# Patient Record
Sex: MALE | Race: BLACK OR AFRICAN AMERICAN | Employment: UNEMPLOYED | URBAN - NONMETROPOLITAN AREA
[De-identification: names, ages, dates, MRNs, and addresses within clinical notes are randomized per-mention and may not be internally consistent; named-entity substitution may affect disease eponyms.]

---

## 2017-06-19 RX ORDER — GABAPENTIN 300 MG/1
CAPSULE ORAL
Qty: 90 CAPSULE | Refills: 6 | Status: ON HOLD | OUTPATIENT
Start: 2017-06-19 | End: 2017-09-14 | Stop reason: HOSPADM

## 2017-07-29 ENCOUNTER — HOSPITAL ENCOUNTER (EMERGENCY)
Age: 52
Discharge: HOME | End: 2017-07-29
Payer: MEDICAID

## 2017-07-29 VITALS
TEMPERATURE: 98.5 F | HEART RATE: 88 BPM | SYSTOLIC BLOOD PRESSURE: 126 MMHG | DIASTOLIC BLOOD PRESSURE: 72 MMHG | OXYGEN SATURATION: 100 % | RESPIRATION RATE: 20 BRPM

## 2017-07-29 DIAGNOSIS — K02.9 PAIN DUE TO DENTAL CARIES: Primary | ICD-10-CM

## 2017-07-29 PROCEDURE — 99282 EMERGENCY DEPT VISIT SF MDM: CPT

## 2017-07-29 PROCEDURE — 6370000000 HC RX 637 (ALT 250 FOR IP): Performed by: PHYSICIAN ASSISTANT

## 2017-07-29 RX ORDER — HYDROCODONE BITARTRATE AND ACETAMINOPHEN 5; 325 MG/1; MG/1
1 TABLET ORAL ONCE
Status: COMPLETED | OUTPATIENT
Start: 2017-07-29 | End: 2017-07-29

## 2017-07-29 RX ORDER — ACETAMINOPHEN AND CODEINE PHOSPHATE 300; 30 MG/1; MG/1
1 TABLET ORAL EVERY 4 HOURS PRN
Qty: 12 TABLET | Refills: 0 | Status: ON HOLD | OUTPATIENT
Start: 2017-07-29 | End: 2017-09-14 | Stop reason: HOSPADM

## 2017-07-29 RX ORDER — PENICILLIN V POTASSIUM 500 MG/1
500 TABLET ORAL 4 TIMES DAILY
Qty: 40 TABLET | Refills: 0 | Status: SHIPPED | OUTPATIENT
Start: 2017-07-29 | End: 2017-08-08

## 2017-07-29 RX ADMIN — HYDROCODONE BITARTRATE AND ACETAMINOPHEN 1 TABLET: 5; 325 TABLET ORAL at 12:47

## 2017-07-29 ASSESSMENT — ENCOUNTER SYMPTOMS
SORE THROAT: 0
WHEEZING: 0
BACK PAIN: 0
NAUSEA: 0
SHORTNESS OF BREATH: 0
BLOOD IN STOOL: 0
ABDOMINAL PAIN: 0
COUGH: 0
VOMITING: 0
DIARRHEA: 0

## 2017-07-29 ASSESSMENT — PAIN SCALES - GENERAL
PAINLEVEL_OUTOF10: 10
PAINLEVEL_OUTOF10: 10

## 2017-07-29 ASSESSMENT — PAIN DESCRIPTION - LOCATION: LOCATION: TEETH

## 2017-07-29 ASSESSMENT — PAIN DESCRIPTION - PAIN TYPE: TYPE: ACUTE PAIN

## 2017-07-29 ASSESSMENT — PAIN DESCRIPTION - FREQUENCY: FREQUENCY: CONTINUOUS

## 2017-07-29 ASSESSMENT — PAIN DESCRIPTION - DESCRIPTORS: DESCRIPTORS: STABBING;SHOOTING;SHARP

## 2017-09-08 ENCOUNTER — HOSPITAL ENCOUNTER (EMERGENCY)
Age: 52
Discharge: HOME | DRG: 753 | End: 2017-09-08
Attending: EMERGENCY MEDICINE
Payer: MEDICAID

## 2017-09-08 ENCOUNTER — HOSPITAL ENCOUNTER (INPATIENT)
Age: 52
LOS: 6 days | Discharge: HOME | DRG: 753 | End: 2017-09-14
Attending: PSYCHIATRY & NEUROLOGY | Admitting: PSYCHIATRY & NEUROLOGY
Payer: MEDICAID

## 2017-09-08 VITALS
BODY MASS INDEX: 28.63 KG/M2 | DIASTOLIC BLOOD PRESSURE: 86 MMHG | RESPIRATION RATE: 18 BRPM | TEMPERATURE: 97.5 F | WEIGHT: 200 LBS | SYSTOLIC BLOOD PRESSURE: 153 MMHG | HEIGHT: 70 IN | HEART RATE: 90 BPM | OXYGEN SATURATION: 98 %

## 2017-09-08 DIAGNOSIS — F19.94 SUBSTANCE INDUCED MOOD DISORDER (HCC): Primary | ICD-10-CM

## 2017-09-08 LAB
ACETAMINOPHEN LEVEL: < 5 UG/ML (ref 0–20)
ALBUMIN SERPL-MCNC: 4.5 G/DL (ref 3.5–5.1)
ALP BLD-CCNC: 47 U/L (ref 38–126)
ALT SERPL-CCNC: 30 U/L (ref 11–66)
AMPHETAMINE+METHAMPHETAMINE URINE SCREEN: NEGATIVE
ANION GAP SERPL CALCULATED.3IONS-SCNC: 11 MEQ/L (ref 8–16)
AST SERPL-CCNC: 40 U/L (ref 5–40)
BARBITURATE QUANTITATIVE URINE: NEGATIVE
BASOPHILS # BLD: 1.3 %
BASOPHILS ABSOLUTE: 0.1 THOU/MM3 (ref 0–0.1)
BENZODIAZEPINE QUANTITATIVE URINE: NEGATIVE
BILIRUB SERPL-MCNC: 0.7 MG/DL (ref 0.3–1.2)
BILIRUBIN DIRECT: < 0.2 MG/DL (ref 0–0.3)
BUN BLDV-MCNC: 15 MG/DL (ref 7–22)
CALCIUM SERPL-MCNC: 10 MG/DL (ref 8.5–10.5)
CANNABINOID QUANTITATIVE URINE: POSITIVE
CHLORIDE BLD-SCNC: 97 MEQ/L (ref 98–111)
CO2: 26 MEQ/L (ref 23–33)
COCAINE METABOLITE QUANTITATIVE URINE: POSITIVE
CREAT SERPL-MCNC: 1 MG/DL (ref 0.4–1.2)
EOSINOPHIL # BLD: 1.2 %
EOSINOPHILS ABSOLUTE: 0.1 THOU/MM3 (ref 0–0.4)
ETHYL ALCOHOL, SERUM: < 0.01 %
GFR SERPL CREATININE-BSD FRML MDRD: > 90 ML/MIN/1.73M2
GLUCOSE BLD-MCNC: 121 MG/DL (ref 70–108)
HCT VFR BLD CALC: 42.9 % (ref 42–52)
HEMOGLOBIN: 14.6 GM/DL (ref 14–18)
LYMPHOCYTES # BLD: 39.7 %
LYMPHOCYTES ABSOLUTE: 2.7 THOU/MM3 (ref 1–4.8)
MCH RBC QN AUTO: 33.6 PG (ref 27–31)
MCHC RBC AUTO-ENTMCNC: 34.1 GM/DL (ref 33–37)
MCV RBC AUTO: 98.5 FL (ref 80–94)
MONOCYTES # BLD: 6 %
MONOCYTES ABSOLUTE: 0.4 THOU/MM3 (ref 0.4–1.3)
NUCLEATED RED BLOOD CELLS: 0 /100 WBC
OPIATES, URINE: NEGATIVE
OSMOLALITY CALCULATION: 270.3 MOSMOL/KG (ref 275–300)
OXYCODONE: NEGATIVE
PDW BLD-RTO: 13.3 % (ref 11.5–14.5)
PHENCYCLIDINE QUANTITATIVE URINE: NEGATIVE
PLATELET # BLD: 271 THOU/MM3 (ref 130–400)
PMV BLD AUTO: 7.3 MCM (ref 7.4–10.4)
POTASSIUM SERPL-SCNC: 3.7 MEQ/L (ref 3.5–5.2)
RBC # BLD: 4.36 MILL/MM3 (ref 4.7–6.1)
RBC # BLD: NORMAL 10*6/UL
SALICYLATE, SERUM: < 0.3 MG/DL (ref 2–10)
SEG NEUTROPHILS: 51.8 %
SEGMENTED NEUTROPHILS ABSOLUTE COUNT: 3.5 THOU/MM3 (ref 1.8–7.7)
SODIUM BLD-SCNC: 134 MEQ/L (ref 135–145)
TOTAL PROTEIN: 8 G/DL (ref 6.1–8)
TSH SERPL DL<=0.05 MIU/L-ACNC: 0.7 UIU/ML (ref 0.4–4.2)
WBC # BLD: 6.7 THOU/MM3 (ref 4.8–10.8)

## 2017-09-08 PROCEDURE — 6370000000 HC RX 637 (ALT 250 FOR IP): Performed by: PSYCHIATRY & NEUROLOGY

## 2017-09-08 PROCEDURE — 84443 ASSAY THYROID STIM HORMONE: CPT

## 2017-09-08 PROCEDURE — G0480 DRUG TEST DEF 1-7 CLASSES: HCPCS

## 2017-09-08 PROCEDURE — 80053 COMPREHEN METABOLIC PANEL: CPT

## 2017-09-08 PROCEDURE — 1240000000 HC EMOTIONAL WELLNESS R&B

## 2017-09-08 PROCEDURE — 80307 DRUG TEST PRSMV CHEM ANLYZR: CPT

## 2017-09-08 PROCEDURE — 36415 COLL VENOUS BLD VENIPUNCTURE: CPT

## 2017-09-08 PROCEDURE — 82248 BILIRUBIN DIRECT: CPT

## 2017-09-08 PROCEDURE — 85025 COMPLETE CBC W/AUTO DIFF WBC: CPT

## 2017-09-08 PROCEDURE — 99284 EMERGENCY DEPT VISIT MOD MDM: CPT

## 2017-09-08 RX ORDER — LORAZEPAM 2 MG/1
2 TABLET ORAL EVERY 6 HOURS PRN
Status: DISCONTINUED | OUTPATIENT
Start: 2017-09-08 | End: 2017-09-14 | Stop reason: HOSPADM

## 2017-09-08 RX ORDER — CITALOPRAM 40 MG/1
40 TABLET ORAL DAILY
Status: DISCONTINUED | OUTPATIENT
Start: 2017-09-08 | End: 2017-09-14 | Stop reason: HOSPADM

## 2017-09-08 RX ORDER — ONDANSETRON 4 MG/1
4 TABLET, ORALLY DISINTEGRATING ORAL 4 TIMES DAILY PRN
Status: DISCONTINUED | OUTPATIENT
Start: 2017-09-08 | End: 2017-09-14 | Stop reason: HOSPADM

## 2017-09-08 RX ORDER — GABAPENTIN 300 MG/1
300 CAPSULE ORAL 3 TIMES DAILY
Status: DISCONTINUED | OUTPATIENT
Start: 2017-09-08 | End: 2017-09-14 | Stop reason: HOSPADM

## 2017-09-08 RX ORDER — NICOTINE 21 MG/24HR
1 PATCH, TRANSDERMAL 24 HOURS TRANSDERMAL DAILY
Status: DISCONTINUED | OUTPATIENT
Start: 2017-09-08 | End: 2017-09-14 | Stop reason: HOSPADM

## 2017-09-08 RX ORDER — HYDROCODONE BITARTRATE AND ACETAMINOPHEN 5; 325 MG/1; MG/1
1 TABLET ORAL EVERY 6 HOURS PRN
Status: DISCONTINUED | OUTPATIENT
Start: 2017-09-08 | End: 2017-09-14 | Stop reason: HOSPADM

## 2017-09-08 RX ORDER — ALBUTEROL SULFATE 90 UG/1
2 AEROSOL, METERED RESPIRATORY (INHALATION) EVERY 6 HOURS PRN
Status: DISCONTINUED | OUTPATIENT
Start: 2017-09-08 | End: 2017-09-14 | Stop reason: HOSPADM

## 2017-09-08 RX ORDER — THIAMINE MONONITRATE (VIT B1) 100 MG
100 TABLET ORAL DAILY
Status: DISCONTINUED | OUTPATIENT
Start: 2017-09-08 | End: 2017-09-14 | Stop reason: HOSPADM

## 2017-09-08 RX ORDER — HYDROXYZINE PAMOATE 25 MG/1
25 CAPSULE ORAL 3 TIMES DAILY PRN
Status: DISCONTINUED | OUTPATIENT
Start: 2017-09-08 | End: 2017-09-14 | Stop reason: HOSPADM

## 2017-09-08 RX ORDER — TRAZODONE HYDROCHLORIDE 50 MG/1
50 TABLET ORAL NIGHTLY PRN
Status: DISCONTINUED | OUTPATIENT
Start: 2017-09-08 | End: 2017-09-08

## 2017-09-08 RX ORDER — FOLIC ACID 1 MG/1
1 TABLET ORAL DAILY
Status: DISCONTINUED | OUTPATIENT
Start: 2017-09-08 | End: 2017-09-14 | Stop reason: HOSPADM

## 2017-09-08 RX ORDER — M-VIT,TX,IRON,MINS/CALC/FOLIC 27MG-0.4MG
1 TABLET ORAL DAILY
Status: DISCONTINUED | OUTPATIENT
Start: 2017-09-08 | End: 2017-09-14 | Stop reason: HOSPADM

## 2017-09-08 RX ORDER — ACETAMINOPHEN 325 MG/1
650 TABLET ORAL EVERY 4 HOURS PRN
Status: DISCONTINUED | OUTPATIENT
Start: 2017-09-08 | End: 2017-09-14 | Stop reason: HOSPADM

## 2017-09-08 RX ORDER — TRAZODONE HYDROCHLORIDE 50 MG/1
150 TABLET ORAL NIGHTLY PRN
Status: DISCONTINUED | OUTPATIENT
Start: 2017-09-08 | End: 2017-09-14 | Stop reason: HOSPADM

## 2017-09-08 RX ORDER — MAGNESIUM HYDROXIDE/ALUMINUM HYDROXICE/SIMETHICONE 120; 1200; 1200 MG/30ML; MG/30ML; MG/30ML
30 SUSPENSION ORAL PRN
Status: DISCONTINUED | OUTPATIENT
Start: 2017-09-08 | End: 2017-09-14 | Stop reason: HOSPADM

## 2017-09-08 RX ADMIN — FOLIC ACID 1 MG: 1 TABLET ORAL at 20:58

## 2017-09-08 RX ADMIN — Medication 100 MG: at 20:58

## 2017-09-08 RX ADMIN — CITALOPRAM 40 MG: 40 TABLET ORAL at 20:58

## 2017-09-08 RX ADMIN — LURASIDONE HYDROCHLORIDE 80 MG: 40 TABLET, FILM COATED ORAL at 20:58

## 2017-09-08 RX ADMIN — GABAPENTIN 300 MG: 300 CAPSULE ORAL at 20:58

## 2017-09-08 RX ADMIN — MULTIPLE VITAMINS W/ MINERALS TAB 1 TABLET: TAB at 20:58

## 2017-09-08 ASSESSMENT — SLEEP AND FATIGUE QUESTIONNAIRES
DIFFICULTY FALLING ASLEEP: YES
DIFFICULTY STAYING ASLEEP: YES
SLEEP PATTERN: INSOMNIA
RESTFUL SLEEP: NO
DO YOU HAVE DIFFICULTY SLEEPING: YES
AVERAGE NUMBER OF SLEEP HOURS: 4
DO YOU USE A SLEEP AID: YES
DIFFICULTY ARISING: YES

## 2017-09-08 ASSESSMENT — ENCOUNTER SYMPTOMS
DIARRHEA: 0
COUGH: 0
SHORTNESS OF BREATH: 1
EYE REDNESS: 0
BACK PAIN: 0
VOMITING: 0
ABDOMINAL PAIN: 0
RHINORRHEA: 0
NAUSEA: 0
WHEEZING: 0
SORE THROAT: 0
EYE DISCHARGE: 0

## 2017-09-08 ASSESSMENT — PAIN SCALES - WONG BAKER: WONGBAKER_NUMERICALRESPONSE: 4

## 2017-09-08 ASSESSMENT — PAIN DESCRIPTION - DESCRIPTORS: DESCRIPTORS: CONSTANT;CRAMPING;CRUSHING;DISCOMFORT

## 2017-09-08 ASSESSMENT — PAIN SCALES - GENERAL
PAINLEVEL_OUTOF10: 10
PAINLEVEL_OUTOF10: 0

## 2017-09-08 ASSESSMENT — PATIENT HEALTH QUESTIONNAIRE - PHQ9: SUM OF ALL RESPONSES TO PHQ QUESTIONS 1-9: 18

## 2017-09-08 ASSESSMENT — LIFESTYLE VARIABLES: HISTORY_ALCOHOL_USE: YES

## 2017-09-08 ASSESSMENT — PAIN DESCRIPTION - PAIN TYPE: TYPE: CHRONIC PAIN

## 2017-09-08 ASSESSMENT — PAIN DESCRIPTION - LOCATION: LOCATION: BACK;FOOT

## 2017-09-09 PROCEDURE — 1240000000 HC EMOTIONAL WELLNESS R&B

## 2017-09-09 PROCEDURE — 90792 PSYCH DIAG EVAL W/MED SRVCS: CPT | Performed by: PSYCHIATRY & NEUROLOGY

## 2017-09-09 PROCEDURE — 94640 AIRWAY INHALATION TREATMENT: CPT

## 2017-09-09 PROCEDURE — 6370000000 HC RX 637 (ALT 250 FOR IP): Performed by: PSYCHIATRY & NEUROLOGY

## 2017-09-09 RX ADMIN — GABAPENTIN 300 MG: 300 CAPSULE ORAL at 08:58

## 2017-09-09 RX ADMIN — HYDROXYZINE PAMOATE 25 MG: 25 CAPSULE ORAL at 08:58

## 2017-09-09 RX ADMIN — LURASIDONE HYDROCHLORIDE 80 MG: 40 TABLET, FILM COATED ORAL at 08:58

## 2017-09-09 RX ADMIN — CITALOPRAM 40 MG: 40 TABLET ORAL at 08:58

## 2017-09-09 RX ADMIN — Medication 100 MG: at 08:58

## 2017-09-09 RX ADMIN — GABAPENTIN 300 MG: 300 CAPSULE ORAL at 21:06

## 2017-09-09 RX ADMIN — GABAPENTIN 300 MG: 300 CAPSULE ORAL at 13:37

## 2017-09-09 RX ADMIN — MULTIPLE VITAMINS W/ MINERALS TAB 1 TABLET: TAB at 08:58

## 2017-09-09 RX ADMIN — FOLIC ACID 1 MG: 1 TABLET ORAL at 08:58

## 2017-09-09 RX ADMIN — HYDROCODONE BITARTRATE AND ACETAMINOPHEN 1 TABLET: 5; 325 TABLET ORAL at 08:58

## 2017-09-09 RX ADMIN — Medication 2 PUFF: at 09:46

## 2017-09-09 ASSESSMENT — PAIN DESCRIPTION - PAIN TYPE: TYPE: CHRONIC PAIN

## 2017-09-09 ASSESSMENT — PAIN SCALES - GENERAL
PAINLEVEL_OUTOF10: 8
PAINLEVEL_OUTOF10: 2

## 2017-09-09 ASSESSMENT — LIFESTYLE VARIABLES: HISTORY_ALCOHOL_USE: YES

## 2017-09-09 ASSESSMENT — PAIN DESCRIPTION - LOCATION: LOCATION: BACK

## 2017-09-09 ASSESSMENT — PAIN DESCRIPTION - DESCRIPTORS: DESCRIPTORS: ACHING;CONSTANT;DISCOMFORT

## 2017-09-10 PROCEDURE — 99231 SBSQ HOSP IP/OBS SF/LOW 25: CPT | Performed by: NURSE PRACTITIONER

## 2017-09-10 PROCEDURE — 94640 AIRWAY INHALATION TREATMENT: CPT

## 2017-09-10 PROCEDURE — 6370000000 HC RX 637 (ALT 250 FOR IP): Performed by: PSYCHIATRY & NEUROLOGY

## 2017-09-10 PROCEDURE — 1240000000 HC EMOTIONAL WELLNESS R&B

## 2017-09-10 RX ADMIN — HYDROCODONE BITARTRATE AND ACETAMINOPHEN 1 TABLET: 5; 325 TABLET ORAL at 13:32

## 2017-09-10 RX ADMIN — CITALOPRAM 40 MG: 40 TABLET ORAL at 08:14

## 2017-09-10 RX ADMIN — Medication 2 PUFF: at 10:25

## 2017-09-10 RX ADMIN — GABAPENTIN 300 MG: 300 CAPSULE ORAL at 08:13

## 2017-09-10 RX ADMIN — MULTIPLE VITAMINS W/ MINERALS TAB 1 TABLET: TAB at 08:13

## 2017-09-10 RX ADMIN — GABAPENTIN 300 MG: 300 CAPSULE ORAL at 13:31

## 2017-09-10 RX ADMIN — LURASIDONE HYDROCHLORIDE 80 MG: 40 TABLET, FILM COATED ORAL at 08:13

## 2017-09-10 RX ADMIN — Medication 2 PUFF: at 20:13

## 2017-09-10 RX ADMIN — GABAPENTIN 300 MG: 300 CAPSULE ORAL at 21:13

## 2017-09-10 RX ADMIN — FOLIC ACID 1 MG: 1 TABLET ORAL at 08:13

## 2017-09-10 RX ADMIN — Medication 100 MG: at 08:13

## 2017-09-10 RX ADMIN — HYDROCODONE BITARTRATE AND ACETAMINOPHEN 1 TABLET: 5; 325 TABLET ORAL at 08:14

## 2017-09-10 RX ADMIN — HYDROXYZINE PAMOATE 25 MG: 25 CAPSULE ORAL at 08:14

## 2017-09-10 ASSESSMENT — PAIN DESCRIPTION - PAIN TYPE: TYPE: CHRONIC PAIN

## 2017-09-10 ASSESSMENT — PAIN SCALES - GENERAL
PAINLEVEL_OUTOF10: 1
PAINLEVEL_OUTOF10: 8
PAINLEVEL_OUTOF10: 6
PAINLEVEL_OUTOF10: 3

## 2017-09-10 ASSESSMENT — PAIN DESCRIPTION - DESCRIPTORS: DESCRIPTORS: ACHING;DISCOMFORT

## 2017-09-10 ASSESSMENT — PAIN DESCRIPTION - LOCATION: LOCATION: BACK

## 2017-09-11 PROCEDURE — 99231 SBSQ HOSP IP/OBS SF/LOW 25: CPT | Performed by: NURSE PRACTITIONER

## 2017-09-11 PROCEDURE — 6370000000 HC RX 637 (ALT 250 FOR IP): Performed by: PSYCHIATRY & NEUROLOGY

## 2017-09-11 PROCEDURE — 94640 AIRWAY INHALATION TREATMENT: CPT

## 2017-09-11 PROCEDURE — 1240000000 HC EMOTIONAL WELLNESS R&B

## 2017-09-11 RX ADMIN — FOLIC ACID 1 MG: 1 TABLET ORAL at 09:02

## 2017-09-11 RX ADMIN — GABAPENTIN 300 MG: 300 CAPSULE ORAL at 09:02

## 2017-09-11 RX ADMIN — HYDROCODONE BITARTRATE AND ACETAMINOPHEN 1 TABLET: 5; 325 TABLET ORAL at 20:37

## 2017-09-11 RX ADMIN — LURASIDONE HYDROCHLORIDE 80 MG: 40 TABLET, FILM COATED ORAL at 09:02

## 2017-09-11 RX ADMIN — GABAPENTIN 300 MG: 300 CAPSULE ORAL at 16:22

## 2017-09-11 RX ADMIN — GABAPENTIN 300 MG: 300 CAPSULE ORAL at 20:36

## 2017-09-11 RX ADMIN — HYDROCODONE BITARTRATE AND ACETAMINOPHEN 1 TABLET: 5; 325 TABLET ORAL at 09:08

## 2017-09-11 RX ADMIN — Medication 2 PUFF: at 21:46

## 2017-09-11 RX ADMIN — CITALOPRAM 40 MG: 40 TABLET ORAL at 09:02

## 2017-09-11 RX ADMIN — Medication 2 PUFF: at 10:05

## 2017-09-11 RX ADMIN — HYDROXYZINE PAMOATE 25 MG: 25 CAPSULE ORAL at 09:09

## 2017-09-11 RX ADMIN — MULTIPLE VITAMINS W/ MINERALS TAB 1 TABLET: TAB at 09:02

## 2017-09-11 RX ADMIN — Medication 100 MG: at 09:02

## 2017-09-11 ASSESSMENT — PAIN SCALES - GENERAL
PAINLEVEL_OUTOF10: 1
PAINLEVEL_OUTOF10: 8
PAINLEVEL_OUTOF10: 10
PAINLEVEL_OUTOF10: 10
PAINLEVEL_OUTOF10: 8
PAINLEVEL_OUTOF10: 3

## 2017-09-11 ASSESSMENT — PAIN DESCRIPTION - PAIN TYPE: TYPE: CHRONIC PAIN

## 2017-09-11 ASSESSMENT — PAIN DESCRIPTION - DESCRIPTORS: DESCRIPTORS: ACHING;DISCOMFORT;TENDER

## 2017-09-11 ASSESSMENT — PAIN DESCRIPTION - LOCATION: LOCATION: BACK

## 2017-09-11 ASSESSMENT — PAIN SCALES - WONG BAKER: WONGBAKER_NUMERICALRESPONSE: 4

## 2017-09-12 PROCEDURE — 1240000000 HC EMOTIONAL WELLNESS R&B

## 2017-09-12 PROCEDURE — 94640 AIRWAY INHALATION TREATMENT: CPT

## 2017-09-12 PROCEDURE — 99231 SBSQ HOSP IP/OBS SF/LOW 25: CPT | Performed by: NURSE PRACTITIONER

## 2017-09-12 PROCEDURE — 6370000000 HC RX 637 (ALT 250 FOR IP): Performed by: PSYCHIATRY & NEUROLOGY

## 2017-09-12 RX ADMIN — Medication 2 PUFF: at 09:36

## 2017-09-12 RX ADMIN — GABAPENTIN 300 MG: 300 CAPSULE ORAL at 08:55

## 2017-09-12 RX ADMIN — HYDROCODONE BITARTRATE AND ACETAMINOPHEN 1 TABLET: 5; 325 TABLET ORAL at 08:55

## 2017-09-12 RX ADMIN — CITALOPRAM 40 MG: 40 TABLET ORAL at 08:55

## 2017-09-12 RX ADMIN — HYDROCODONE BITARTRATE AND ACETAMINOPHEN 1 TABLET: 5; 325 TABLET ORAL at 21:09

## 2017-09-12 RX ADMIN — GABAPENTIN 300 MG: 300 CAPSULE ORAL at 13:25

## 2017-09-12 RX ADMIN — Medication 100 MG: at 08:55

## 2017-09-12 RX ADMIN — FOLIC ACID 1 MG: 1 TABLET ORAL at 08:55

## 2017-09-12 RX ADMIN — LURASIDONE HYDROCHLORIDE 80 MG: 40 TABLET, FILM COATED ORAL at 08:55

## 2017-09-12 RX ADMIN — TRAZODONE HYDROCHLORIDE 150 MG: 50 TABLET ORAL at 00:26

## 2017-09-12 RX ADMIN — TRAZODONE HYDROCHLORIDE 150 MG: 50 TABLET ORAL at 21:09

## 2017-09-12 RX ADMIN — MULTIPLE VITAMINS W/ MINERALS TAB 1 TABLET: TAB at 08:55

## 2017-09-12 RX ADMIN — GABAPENTIN 300 MG: 300 CAPSULE ORAL at 21:09

## 2017-09-12 RX ADMIN — Medication 2 PUFF: at 20:32

## 2017-09-12 ASSESSMENT — PAIN SCALES - GENERAL
PAINLEVEL_OUTOF10: 8
PAINLEVEL_OUTOF10: 8
PAINLEVEL_OUTOF10: 2
PAINLEVEL_OUTOF10: 8

## 2017-09-12 ASSESSMENT — PAIN DESCRIPTION - PAIN TYPE
TYPE: CHRONIC PAIN
TYPE: CHRONIC PAIN

## 2017-09-12 ASSESSMENT — PAIN DESCRIPTION - DESCRIPTORS
DESCRIPTORS: ACHING;DISCOMFORT
DESCRIPTORS: ACHING;DISCOMFORT

## 2017-09-12 ASSESSMENT — PAIN DESCRIPTION - FREQUENCY: FREQUENCY: CONTINUOUS

## 2017-09-12 ASSESSMENT — PAIN DESCRIPTION - LOCATION
LOCATION: BACK
LOCATION: BACK

## 2017-09-13 PROCEDURE — 94640 AIRWAY INHALATION TREATMENT: CPT

## 2017-09-13 PROCEDURE — 1240000000 HC EMOTIONAL WELLNESS R&B

## 2017-09-13 PROCEDURE — 99231 SBSQ HOSP IP/OBS SF/LOW 25: CPT | Performed by: NURSE PRACTITIONER

## 2017-09-13 PROCEDURE — 6370000000 HC RX 637 (ALT 250 FOR IP): Performed by: PSYCHIATRY & NEUROLOGY

## 2017-09-13 RX ADMIN — Medication 100 MG: at 08:53

## 2017-09-13 RX ADMIN — GABAPENTIN 300 MG: 300 CAPSULE ORAL at 08:53

## 2017-09-13 RX ADMIN — GABAPENTIN 300 MG: 300 CAPSULE ORAL at 20:50

## 2017-09-13 RX ADMIN — CITALOPRAM 40 MG: 40 TABLET ORAL at 08:53

## 2017-09-13 RX ADMIN — MULTIPLE VITAMINS W/ MINERALS TAB 1 TABLET: TAB at 08:53

## 2017-09-13 RX ADMIN — FOLIC ACID 1 MG: 1 TABLET ORAL at 08:53

## 2017-09-13 RX ADMIN — GABAPENTIN 300 MG: 300 CAPSULE ORAL at 15:07

## 2017-09-13 RX ADMIN — LURASIDONE HYDROCHLORIDE 80 MG: 40 TABLET, FILM COATED ORAL at 08:53

## 2017-09-13 RX ADMIN — Medication 2 PUFF: at 19:38

## 2017-09-13 RX ADMIN — TRAZODONE HYDROCHLORIDE 150 MG: 50 TABLET ORAL at 20:50

## 2017-09-13 RX ADMIN — Medication 2 PUFF: at 10:52

## 2017-09-13 ASSESSMENT — PAIN SCALES - GENERAL: PAINLEVEL_OUTOF10: 8

## 2017-09-14 VITALS
HEART RATE: 73 BPM | BODY MASS INDEX: 28.63 KG/M2 | OXYGEN SATURATION: 97 % | TEMPERATURE: 96.4 F | SYSTOLIC BLOOD PRESSURE: 133 MMHG | RESPIRATION RATE: 16 BRPM | DIASTOLIC BLOOD PRESSURE: 78 MMHG | HEIGHT: 70 IN | WEIGHT: 200 LBS

## 2017-09-14 PROCEDURE — 99238 HOSP IP/OBS DSCHRG MGMT 30/<: CPT | Performed by: NURSE PRACTITIONER

## 2017-09-14 PROCEDURE — 5130000000 HC BRIDGE APPOINTMENT

## 2017-09-14 PROCEDURE — 94640 AIRWAY INHALATION TREATMENT: CPT

## 2017-09-14 PROCEDURE — 6370000000 HC RX 637 (ALT 250 FOR IP): Performed by: PSYCHIATRY & NEUROLOGY

## 2017-09-14 RX ORDER — GABAPENTIN 300 MG/1
300 CAPSULE ORAL 3 TIMES DAILY
Qty: 90 CAPSULE | Refills: 0 | Status: SHIPPED | OUTPATIENT
Start: 2017-09-14 | End: 2018-04-13

## 2017-09-14 RX ORDER — HYDROCODONE BITARTRATE AND ACETAMINOPHEN 5; 325 MG/1; MG/1
1 TABLET ORAL EVERY 8 HOURS PRN
Qty: 21 TABLET | Refills: 0 | Status: SHIPPED | OUTPATIENT
Start: 2017-09-14 | End: 2017-09-21

## 2017-09-14 RX ORDER — HYDROXYZINE PAMOATE 25 MG/1
25 CAPSULE ORAL 3 TIMES DAILY PRN
Qty: 90 CAPSULE | Refills: 0 | Status: SHIPPED | OUTPATIENT
Start: 2017-09-14 | End: 2017-09-28

## 2017-09-14 RX ORDER — M-VIT,TX,IRON,MINS/CALC/FOLIC 27MG-0.4MG
1 TABLET ORAL DAILY
Qty: 30 TABLET | Refills: 0 | Status: SHIPPED | OUTPATIENT
Start: 2017-09-14 | End: 2018-04-13

## 2017-09-14 RX ORDER — CITALOPRAM 40 MG/1
40 TABLET ORAL DAILY
Qty: 30 TABLET | Refills: 0 | Status: SHIPPED | OUTPATIENT
Start: 2017-09-14 | End: 2018-04-13

## 2017-09-14 RX ORDER — LANOLIN ALCOHOL/MO/W.PET/CERES
100 CREAM (GRAM) TOPICAL DAILY
Qty: 30 TABLET | Refills: 0 | Status: SHIPPED | OUTPATIENT
Start: 2017-09-14 | End: 2018-04-13

## 2017-09-14 RX ORDER — TRAZODONE HYDROCHLORIDE 150 MG/1
150 TABLET ORAL NIGHTLY PRN
Qty: 30 TABLET | Refills: 0 | Status: SHIPPED | OUTPATIENT
Start: 2017-09-14 | End: 2018-04-13

## 2017-09-14 RX ADMIN — GABAPENTIN 300 MG: 300 CAPSULE ORAL at 09:19

## 2017-09-14 RX ADMIN — GABAPENTIN 300 MG: 300 CAPSULE ORAL at 13:33

## 2017-09-14 RX ADMIN — CITALOPRAM 40 MG: 40 TABLET ORAL at 09:19

## 2017-09-14 RX ADMIN — Medication 2 PUFF: at 09:04

## 2017-09-14 RX ADMIN — HYDROCODONE BITARTRATE AND ACETAMINOPHEN 1 TABLET: 5; 325 TABLET ORAL at 03:07

## 2017-09-14 RX ADMIN — Medication 100 MG: at 09:20

## 2017-09-14 RX ADMIN — MULTIPLE VITAMINS W/ MINERALS TAB 1 TABLET: TAB at 09:20

## 2017-09-14 RX ADMIN — FOLIC ACID 1 MG: 1 TABLET ORAL at 09:19

## 2017-09-14 RX ADMIN — LURASIDONE HYDROCHLORIDE 80 MG: 40 TABLET, FILM COATED ORAL at 09:19

## 2017-09-14 ASSESSMENT — PAIN DESCRIPTION - ONSET: ONSET: AWAKENED FROM SLEEP

## 2017-09-14 ASSESSMENT — PAIN DESCRIPTION - LOCATION: LOCATION: BACK

## 2017-09-14 ASSESSMENT — PAIN SCALES - GENERAL
PAINLEVEL_OUTOF10: 10
PAINLEVEL_OUTOF10: 4
PAINLEVEL_OUTOF10: 0

## 2017-09-14 ASSESSMENT — PAIN DESCRIPTION - DESCRIPTORS: DESCRIPTORS: THROBBING

## 2017-09-14 ASSESSMENT — PAIN DESCRIPTION - ORIENTATION: ORIENTATION: LOWER

## 2017-09-14 ASSESSMENT — PAIN DESCRIPTION - PAIN TYPE: TYPE: CHRONIC PAIN

## 2017-09-14 ASSESSMENT — PAIN DESCRIPTION - FREQUENCY: FREQUENCY: CONTINUOUS

## 2017-09-17 ENCOUNTER — HOSPITAL ENCOUNTER (EMERGENCY)
Age: 52
Discharge: HOME | End: 2017-09-17
Payer: MEDICAID

## 2017-09-17 ENCOUNTER — APPOINTMENT (OUTPATIENT)
Dept: GENERAL RADIOLOGY | Age: 52
End: 2017-09-17
Payer: MEDICAID

## 2017-09-17 VITALS
OXYGEN SATURATION: 98 % | RESPIRATION RATE: 14 BRPM | HEIGHT: 70 IN | DIASTOLIC BLOOD PRESSURE: 72 MMHG | BODY MASS INDEX: 27.49 KG/M2 | TEMPERATURE: 98.2 F | WEIGHT: 192 LBS | HEART RATE: 85 BPM | SYSTOLIC BLOOD PRESSURE: 129 MMHG

## 2017-09-17 DIAGNOSIS — S46.911A SHOULDER STRAIN, RIGHT, INITIAL ENCOUNTER: Primary | ICD-10-CM

## 2017-09-17 PROCEDURE — 99283 EMERGENCY DEPT VISIT LOW MDM: CPT

## 2017-09-17 PROCEDURE — 73030 X-RAY EXAM OF SHOULDER: CPT

## 2017-09-17 RX ORDER — IBUPROFEN 600 MG/1
600 TABLET ORAL EVERY 6 HOURS PRN
Qty: 30 TABLET | Refills: 0 | Status: SHIPPED | OUTPATIENT
Start: 2017-09-17 | End: 2018-04-13

## 2017-09-17 RX ORDER — ACETAMINOPHEN 500 MG
1000 TABLET ORAL ONCE
Status: DISCONTINUED | OUTPATIENT
Start: 2017-09-17 | End: 2017-09-17 | Stop reason: HOSPADM

## 2017-09-17 ASSESSMENT — PAIN DESCRIPTION - DESCRIPTORS: DESCRIPTORS: THROBBING

## 2017-09-17 ASSESSMENT — ENCOUNTER SYMPTOMS
EYE DISCHARGE: 0
NAUSEA: 0
ABDOMINAL PAIN: 0
CONSTIPATION: 0
WHEEZING: 0
EYE REDNESS: 0
BACK PAIN: 0
VOMITING: 0
RHINORRHEA: 0
SHORTNESS OF BREATH: 0
SORE THROAT: 0
COLOR CHANGE: 0
DIARRHEA: 0
COUGH: 0

## 2017-09-17 ASSESSMENT — PAIN SCALES - GENERAL: PAINLEVEL_OUTOF10: 10

## 2017-09-17 ASSESSMENT — PAIN DESCRIPTION - PAIN TYPE: TYPE: ACUTE PAIN

## 2017-09-17 ASSESSMENT — PAIN DESCRIPTION - LOCATION: LOCATION: SHOULDER

## 2017-09-17 ASSESSMENT — PAIN DESCRIPTION - ORIENTATION: ORIENTATION: RIGHT

## 2017-09-23 ENCOUNTER — HOSPITAL ENCOUNTER (EMERGENCY)
Age: 52
Discharge: HOME | End: 2017-09-23
Attending: EMERGENCY MEDICINE
Payer: MEDICAID

## 2017-09-23 VITALS
HEART RATE: 85 BPM | RESPIRATION RATE: 22 BRPM | BODY MASS INDEX: 27.49 KG/M2 | OXYGEN SATURATION: 99 % | DIASTOLIC BLOOD PRESSURE: 86 MMHG | SYSTOLIC BLOOD PRESSURE: 146 MMHG | HEIGHT: 70 IN | TEMPERATURE: 98 F | WEIGHT: 192 LBS

## 2017-09-23 DIAGNOSIS — K02.9 PAIN DUE TO DENTAL CARIES: Primary | ICD-10-CM

## 2017-09-23 PROCEDURE — 99282 EMERGENCY DEPT VISIT SF MDM: CPT

## 2017-09-23 RX ORDER — NAPROXEN 375 MG/1
375 TABLET ORAL 2 TIMES DAILY
Qty: 15 TABLET | Refills: 0 | Status: SHIPPED | OUTPATIENT
Start: 2017-09-23 | End: 2018-04-13

## 2017-09-23 RX ORDER — PENICILLIN V POTASSIUM 500 MG/1
500 TABLET ORAL 4 TIMES DAILY
Qty: 28 TABLET | Refills: 0 | Status: SHIPPED | OUTPATIENT
Start: 2017-09-23 | End: 2017-09-30

## 2017-09-23 ASSESSMENT — PAIN DESCRIPTION - PAIN TYPE: TYPE: ACUTE PAIN

## 2017-09-23 ASSESSMENT — ENCOUNTER SYMPTOMS
SHORTNESS OF BREATH: 0
ABDOMINAL PAIN: 0
RHINORRHEA: 0
NAUSEA: 0
DIARRHEA: 0
SORE THROAT: 0
VOMITING: 0
WHEEZING: 0
BACK PAIN: 0
COUGH: 0
EYE DISCHARGE: 0
EYE REDNESS: 0

## 2017-09-23 ASSESSMENT — PAIN DESCRIPTION - ORIENTATION: ORIENTATION: RIGHT

## 2017-09-23 ASSESSMENT — PAIN SCALES - GENERAL: PAINLEVEL_OUTOF10: 10

## 2017-09-23 ASSESSMENT — PAIN DESCRIPTION - DESCRIPTORS: DESCRIPTORS: ACHING;THROBBING

## 2017-10-03 ENCOUNTER — HOSPITAL ENCOUNTER (EMERGENCY)
Age: 52
Discharge: HOME | End: 2017-10-03
Payer: MEDICAID

## 2017-10-03 VITALS
SYSTOLIC BLOOD PRESSURE: 131 MMHG | BODY MASS INDEX: 27.49 KG/M2 | OXYGEN SATURATION: 98 % | RESPIRATION RATE: 18 BRPM | TEMPERATURE: 98.8 F | HEIGHT: 70 IN | DIASTOLIC BLOOD PRESSURE: 83 MMHG | HEART RATE: 65 BPM | WEIGHT: 192 LBS

## 2017-10-03 DIAGNOSIS — S39.012A LUMBAR STRAIN, INITIAL ENCOUNTER: Primary | ICD-10-CM

## 2017-10-03 PROCEDURE — 99283 EMERGENCY DEPT VISIT LOW MDM: CPT

## 2017-10-03 PROCEDURE — 6360000002 HC RX W HCPCS: Performed by: NURSE PRACTITIONER

## 2017-10-03 PROCEDURE — 96372 THER/PROPH/DIAG INJ SC/IM: CPT

## 2017-10-03 PROCEDURE — 6370000000 HC RX 637 (ALT 250 FOR IP): Performed by: NURSE PRACTITIONER

## 2017-10-03 RX ORDER — ORPHENADRINE CITRATE 30 MG/ML
60 INJECTION INTRAMUSCULAR; INTRAVENOUS ONCE
Status: COMPLETED | OUTPATIENT
Start: 2017-10-03 | End: 2017-10-03

## 2017-10-03 RX ORDER — TIZANIDINE 4 MG/1
4 TABLET ORAL EVERY 6 HOURS PRN
Qty: 20 TABLET | Refills: 0 | Status: SHIPPED | OUTPATIENT
Start: 2017-10-03 | End: 2018-04-13

## 2017-10-03 RX ORDER — LIDOCAINE 50 MG/G
1 PATCH TOPICAL ONCE
Status: DISCONTINUED | OUTPATIENT
Start: 2017-10-03 | End: 2017-10-03 | Stop reason: HOSPADM

## 2017-10-03 RX ORDER — KETOROLAC TROMETHAMINE 30 MG/ML
30 INJECTION, SOLUTION INTRAMUSCULAR; INTRAVENOUS ONCE
Status: COMPLETED | OUTPATIENT
Start: 2017-10-03 | End: 2017-10-03

## 2017-10-03 RX ORDER — PREDNISONE 20 MG/1
60 TABLET ORAL ONCE
Status: COMPLETED | OUTPATIENT
Start: 2017-10-03 | End: 2017-10-03

## 2017-10-03 RX ORDER — PREDNISONE 20 MG/1
TABLET ORAL
Qty: 18 TABLET | Refills: 0 | Status: SHIPPED | OUTPATIENT
Start: 2017-10-03 | End: 2017-10-13

## 2017-10-03 RX ADMIN — PREDNISONE 60 MG: 20 TABLET ORAL at 10:52

## 2017-10-03 RX ADMIN — ORPHENADRINE CITRATE 60 MG: 30 INJECTION INTRAMUSCULAR; INTRAVENOUS at 10:52

## 2017-10-03 RX ADMIN — KETOROLAC TROMETHAMINE 30 MG: 30 INJECTION, SOLUTION INTRAMUSCULAR at 10:52

## 2017-10-03 ASSESSMENT — ENCOUNTER SYMPTOMS
CONSTIPATION: 0
EYE REDNESS: 0
COLOR CHANGE: 0
NAUSEA: 0
CHEST TIGHTNESS: 0
SINUS PRESSURE: 0
DIARRHEA: 0
BLOOD IN STOOL: 0
ABDOMINAL PAIN: 0
ABDOMINAL DISTENTION: 0
RHINORRHEA: 0
SHORTNESS OF BREATH: 0
BACK PAIN: 1
VOICE CHANGE: 0
VOMITING: 0
PHOTOPHOBIA: 0
COUGH: 0
SORE THROAT: 0
WHEEZING: 0

## 2017-10-03 ASSESSMENT — PAIN DESCRIPTION - PAIN TYPE
TYPE: ACUTE PAIN
TYPE: ACUTE PAIN

## 2017-10-03 ASSESSMENT — PAIN DESCRIPTION - PROGRESSION: CLINICAL_PROGRESSION: GRADUALLY WORSENING

## 2017-10-03 ASSESSMENT — PAIN SCALES - GENERAL
PAINLEVEL_OUTOF10: 6
PAINLEVEL_OUTOF10: 10

## 2017-10-03 ASSESSMENT — PAIN DESCRIPTION - DIRECTION: RADIATING_TOWARDS: LEFT LEG

## 2017-10-03 ASSESSMENT — PAIN DESCRIPTION - DESCRIPTORS: DESCRIPTORS: BURNING;CONSTANT

## 2017-10-03 ASSESSMENT — PAIN DESCRIPTION - ORIENTATION
ORIENTATION: LOWER
ORIENTATION: LOWER

## 2017-10-03 ASSESSMENT — PAIN DESCRIPTION - FREQUENCY
FREQUENCY: CONTINUOUS
FREQUENCY: CONTINUOUS

## 2017-10-03 ASSESSMENT — PAIN DESCRIPTION - ONSET: ONSET: AWAKENED FROM SLEEP

## 2017-10-03 ASSESSMENT — PAIN DESCRIPTION - LOCATION
LOCATION: BACK
LOCATION: BACK

## 2017-10-03 NOTE — ED AVS SNAPSHOT
After Visit Summary  (Discharge Instructions)    Medication List for Home    Based on the information you provided to us as well as any changes during this visit, the following is your updated medication list.  Compare this with your prescription bottles at home. If you have any questions or concerns, contact your primary care physician's office. Daily Medication List (This medication list can be shared with any Healthcare provider who is helping you manage your medications)      There are NEW medications for you.  START taking them after you leave the hospital     predniSONE 20 MG tablet   Commonly known as:  DELTASONE   Take 60 mg (3 tabs) once daily for 3 days then 40 mg (2 tabs) once daily for 3 days then 20 mg (1 tab) once daily for 3 days       tiZANidine 4 MG tablet   Commonly known as:  ZANAFLEX   Take 1 tablet by mouth every 6 hours as needed (muscle spasm)         ASK your doctor about these medications if you have questions     albuterol sulfate  (90 Base) MCG/ACT inhaler   Commonly known as:  PROVENTIL HFA   Inhale 2 puffs into the lungs every 6 hours as needed for Wheezing       citalopram 40 MG tablet   Commonly known as:  CELEXA   Take 1 tablet by mouth daily       fluticasone 110 MCG/ACT inhaler   Commonly known as:  FLOVENT HFA   Inhale 2 puffs into the lungs 2 times daily       gabapentin 300 MG capsule   Commonly known as:  NEURONTIN   Take 1 capsule by mouth 3 times daily       ibuprofen 600 MG tablet   Commonly known as:  ADVIL;MOTRIN   Take 1 tablet by mouth every 6 hours as needed for Pain       lurasidone 80 MG Tabs tablet   Commonly known as:  LATUDA   Take 1 tablet by mouth daily       naproxen 375 MG tablet   Commonly known as:  NAPROSYN   Take 1 tablet by mouth 2 times daily for 15 doses       therapeutic multivitamin-minerals tablet   Take 1 tablet by mouth daily       thiamine 100 MG tablet   Take 1 tablet by mouth daily       traZODone 150 MG tablet Visit Information     Date of Visit Department Dept Phone    10/3/2017 [facility] [phone]      You were seen by     You were seen by Adalid Riojas NP. Follow-up Appointments    Below is a list of your follow-up and future appointments. This may not be a complete list as you may have made appointments directly with providers that we are not aware of or your providers may have made some for you. Please call your providers to confirm appointments. It is important to keep your appointments. Please bring your current insurance card, photo ID, co-pay, and all medication bottles to your appointment. If self-pay, payment is expected at the time of service. Follow-up Information     Follow up with Kimberly John in 1 week. Why:  If symptoms worsen or fail to improve     Contact information:    [facility]      Preventive Care        Date Due    Hepatitis C screening is recommended for all adults regardless of risk factors born between [facility] at least once (lifetime) who have never been tested. 1964    HIV screening is recommended for all people regardless of risk factors  aged 15-65 years at least once (lifetime) who have never been HIV tested. 2/11/1979    Tetanus Combination Vaccine (1 - Tdap) 2/11/1983    Pneumococcal Vaccine - Pneumovax for adults aged 19-64 years with: chronic heart disease, chronic lung disease, diabetes mellitus, alcoholism, chronic liver disease, or cigarette smoking. (1 of 1 - PPSV23) 2/11/1983    Diabetes Screening 2/11/2004    Yearly Flu Vaccine (1) 9/1/2017    Colonoscopy 8/1/2019    Cholesterol Screening 7/15/2020                 Care Plan Once You Return Home    This section includes instructions you will need to follow once you leave the hospital.  Your care team will discuss these with you, so you and those caring for you know how to best care for your health needs at home. discharge    Certain functionality such as prescription refills, scheduling appointments or sending messages to your provider are not activated if your provider does not use [facility] in his/her office    For questions regarding your Justinhart account call [phone]. If you have a clinical question, please call your doctor's office. The information on all pages of the After Visit Summary has been reviewed with me, the patient and/or responsible adult, by my health care provider(s). I had the opportunity to ask questions regarding this information. I understand I should dispose of my armband safely at home to protect my health information. A complete copy of the After Visit Summary has been given to me, the patient and/or responsible adult. Patient Signature/Responsible Adult: ___________________________________    Nurse Signature: ___________________________________  Resident/MLP Signature: ___________________________________  Attending Signature: ___________________________________    Date:____________Time:____________              Discharge Instructions            Back Strain: Care Instructions  Your Care Instructions    Back strain happens when you overstretch, or pull, a muscle in your back. You may hurt your back in an accident or when you exercise or lift something. Most back pain will get better with rest and time. You can take care of yourself at home to help your back heal.  Follow-up care is a key part of your treatment and safety. Be sure to make and go to all appointments, and call your doctor if you are having problems. It's also a good idea to know your test results and keep a list of the medicines you take. How can you care for yourself at home? · Try to stay as active as you can, but stop or reduce any activity that causes pain. · Put ice or a cold pack on the sore muscle for 10 to 20 minutes at a time to stop swelling.  Try this every 1 to 2 hours for 3 days (when you are awake) or until the swelling goes down. Put a thin cloth between the ice pack and your skin. · After 2 or 3 days, apply a heating pad on low or a warm cloth to your back. Some doctors suggest that you go back and forth between hot and cold treatments. · Take pain medicines exactly as directed. ¨ If the doctor gave you a prescription medicine for pain, take it as prescribed. ¨ If you are not taking a prescription pain medicine, ask your doctor if you can take an over-the-counter medicine. · Try sleeping on your side with a pillow between your legs. Or put a pillow under your knees when you lie on your back. These measures can ease pain in your lower back. · Return to your usual level of activity slowly. When should you call for help? Call 911 anytime you think you may need emergency care. For example, call if:  · You are unable to move a leg at all. Call your doctor now or seek immediate medical care if:  · You have new or worse symptoms in your legs, belly, or buttocks. Symptoms may include:  ¨ Numbness or tingling. ¨ Weakness. ¨ Pain. · You lose bladder or bowel control. Watch closely for changes in your health, and be sure to contact your doctor if you are not getting better as expected. Where can you learn more? Go to https://Tyrogenex.ScoreStream. org and sign in to your Flavours account. Enter W992 in the PercSys box to learn more about \"Back Strain: Care Instructions. \"     If you do not have an account, please click on the \"Sign Up Now\" link. Current as of: March 21, 2017  Content Version: 11.3  © 2165-6023 [employer]. Care instructions adapted under license by [facility] ([facility]). If you have questions about a medical condition or this instruction, always ask your healthcare professional. [place] [address] any warranty or liability for your use of this information.       More Information Back Pain, Emergency or Urgent Symptoms: Care Instructions  Your Care Instructions  Many people have back pain at one time or another. In most cases, pain gets better with self-care that includes over-the-counter pain medicine, ice, heat, and exercises. Unless you have symptoms of a severe injury or heart attack, you may be able to give yourself a few days before you call a doctor. But some back problems are very serious. Do not ignore symptoms that need to be checked right away. Follow-up care is a key part of your treatment and safety. Be sure to make and go to all appointments, and call your doctor if you are having problems. It's also a good idea to know your test results and keep a list of the medicines you take. How can you care for yourself at home? · Sit or lie in positions that are most comfortable and that reduce your pain. Try one of these positions when you lie down:  ¨ Lie on your back with your knees bent and supported by large pillows. ¨ Lie on the floor with your legs on the seat of a sofa or chair. Donnise Falter on your side with your knees and hips bent and a pillow between your legs. ¨ Lie on your stomach if it does not make pain worse. · Do not sit up in bed, and avoid soft couches and twisted positions. Bed rest can help relieve pain at first, but it delays healing. Avoid bed rest after the first day. · Change positions every 30 minutes. If you must sit for long periods of time, take breaks from sitting. Get up and walk around, or lie flat. · Try using a heating pad on a low or medium setting, for 15 to 20 minutes every 2 or 3 hours. Try a warm shower in place of one session with the heating pad. You can also buy single-use heat wraps that last up to 8 hours. You can also try ice or cold packs on your back for 10 to 20 minutes at a time, several times a day. (Put a thin cloth between the ice pack and your skin.) This reduces pain and makes it easier to be active and exercise. · Take pain medicines exactly as directed. ¨ If the doctor gave you a prescription medicine for pain, take it as prescribed. ¨ If you are not taking a prescription pain medicine, ask your doctor if you can take an over-the-counter medicine. When should you call for help? Call 911 anytime you think you may need emergency care. For example, call if:  · You are unable to move a leg at all. · You have back pain with severe belly pain. · You have symptoms of a heart attack. These may include:  ¨ Chest pain or pressure, or a strange feeling in the chest.  ¨ Sweating. ¨ Shortness of breath. ¨ Nausea or vomiting. ¨ Pain, pressure, or a strange feeling in the back, neck, jaw, or upper belly or in one or both shoulders or arms. ¨ Lightheadedness or sudden weakness. ¨ A fast or irregular heartbeat. After you call 911, the  may tell you to chew 1 adult-strength or 2 to 4 low-dose aspirin. Wait for an ambulance. Do not try to drive yourself. Call your doctor now or seek immediate medical care if:  · You have new or worse symptoms in your arms, legs, chest, belly, or buttocks. Symptoms may include:  ¨ Numbness or tingling. ¨ Weakness. ¨ Pain. · You lose bladder or bowel control. · You have back pain and:  ¨ You have injured your back while lifting or doing some other activity. Call if the pain is severe, has not gone away after 1 or 2 days, and you cannot do your normal daily activities. ¨ You have had a back injury before that needed treatment. ¨ Your pain has lasted longer than 4 weeks. ¨ You have had weight loss you cannot explain. ¨ You are age 48 or older. ¨ You have cancer now or have had it before. Watch closely for changes in your health, and be sure to contact your doctor if you are not getting better as expected. Where can you learn more? Go to https://alfred.MyNines. org and sign in to your [employer] account.  Enter H240 in the SoundHound box to learn more

## 2017-10-03 NOTE — ED PROVIDER NOTES
[facility] Emergency Department    CHIEF COMPLAINT       Chief Complaint   Patient presents with    Back Pain     Lower- Radiates down left leg        Nurses Notes reviewed and I agree except as noted in the HPI. HISTORY OF PRESENT ILLNESS    Timi Garcia is a 47 y.o. male who presents to the ED for evaluation of lower back pain. Patient states that he has a history of arthritis, chronic back pain, and that he had lower back surgery in 2004. He states that he awoke this morning at 4:00am with the pain, and that he took Norco at 7:00am with some relief. He reports that he then went to work, but was sent home due to the pain. Patient rates his pain at a 10/10 in severity, and states that the pain is burning in quality. He states that the pain is continuous in duration, and that walking worsens the pain. He states that the pain radiates down his left leg, but he denies any numbness or tingling. He also denies any loss of bowel or bladder control, abdominal pain, chest pain, or shortness of breath. He states that he does have a history of smoking. Patient denies further complaints at initial encounter. Pain description:  Onset: 4:00am  Location: Lower back  Duration: Constant  Character: Burning  Aggravating factors: Walking  Radiation: Down left leg  Severity: 10/10    Experienced previously: Yes    HPI was provided by the patient. REVIEW OF SYSTEMS     Review of Systems   Constitutional: Negative for appetite change, chills, diaphoresis, fatigue, fever and unexpected weight change. HENT: Negative for congestion, hearing loss, postnasal drip, rhinorrhea, sinus pressure, sore throat and voice change. Eyes: Negative for photophobia, redness and visual disturbance. Respiratory: Negative for cough, chest tightness, shortness of breath and wheezing. Cardiovascular: Negative for chest pain and palpitations.    Gastrointestinal: Negative for abdominal distention, abdominal pain, blood in stool, department, I observed the patient's vital signs to be within acceptable range. On exam, I appreciated no lumbar tenderness, no SI tenderness, and a negative straight leg raise. Within the department, the patient was treated with Norflex, Toradol, Deltasone, and Lidoderm for pain. I observed the patient's condition to improve during the duration of their stay. I explained my proposed course of treatment to the patient, who was amenable to my decision. They were discharged home in stable condition with prescriptions for Zanaflex and Deltasone, and the patient will return to the ED if their symptoms become more severe in nature or otherwise change. Medications   lidocaine (LIDODERM) 5 % 1 patch (1 patch Transdermal Patch Applied 10/3/17 1052)   orphenadrine (NORFLEX) injection 60 mg (60 mg Intramuscular Given 10/3/17 1052)   ketorolac (TORADOL) injection 30 mg (30 mg Intramuscular Given 10/3/17 1052)   predniSONE (DELTASONE) tablet 60 mg (60 mg Oral Given 10/3/17 1052)         Patient was seen independently by myself. The patient's final impression and disposition and plan was determined by myself. CRITICAL CARE:   None    CONSULTS:  None    PROCEDURES:  None    FINAL IMPRESSION      1.  Lumbar strain, initial encounter          DISPOSITION/PLAN   Discharge    PATIENT REFERRED TO:  [address] [place] [address] [address]  [phone]  Call in 1 week  If symptoms worsen or fail to improve       DISCHARGE MEDICATIONS:  Discharge Medication List as of 10/3/2017 11:58 AM      START taking these medications    Details   tiZANidine (ZANAFLEX) 4 MG tablet Take 1 tablet by mouth every 6 hours as needed (muscle spasm), Disp-20 tablet, R-0Print      predniSONE (DELTASONE) 20 MG tablet Take 60 mg (3 tabs) once daily for 3 days then 40 mg (2 tabs) once daily for 3 days then 20 mg (1 tab) once daily for 3 days, Disp-18 tablet, R-0Print             (Please note that portions of this note were completed

## 2018-04-13 ENCOUNTER — APPOINTMENT (OUTPATIENT)
Dept: GENERAL RADIOLOGY | Age: 53
End: 2018-04-13
Payer: MEDICAID

## 2018-04-13 ENCOUNTER — HOSPITAL ENCOUNTER (EMERGENCY)
Age: 53
Discharge: HOME | End: 2018-04-13
Payer: MEDICAID

## 2018-04-13 VITALS
HEART RATE: 80 BPM | WEIGHT: 180 LBS | DIASTOLIC BLOOD PRESSURE: 100 MMHG | SYSTOLIC BLOOD PRESSURE: 171 MMHG | OXYGEN SATURATION: 96 % | BODY MASS INDEX: 25.83 KG/M2 | RESPIRATION RATE: 18 BRPM | TEMPERATURE: 98.9 F

## 2018-04-13 DIAGNOSIS — J40 BRONCHITIS: Primary | ICD-10-CM

## 2018-04-13 LAB
ANION GAP SERPL CALCULATED.3IONS-SCNC: 14 MEQ/L (ref 8–16)
BASOPHILS # BLD: 0.7 %
BASOPHILS ABSOLUTE: 0 THOU/MM3 (ref 0–0.1)
BUN BLDV-MCNC: 18 MG/DL (ref 7–22)
CALCIUM SERPL-MCNC: 9.7 MG/DL (ref 8.5–10.5)
CHLORIDE BLD-SCNC: 100 MEQ/L (ref 98–111)
CO2: 26 MEQ/L (ref 23–33)
CREAT SERPL-MCNC: 1 MG/DL (ref 0.4–1.2)
EOSINOPHIL # BLD: 0.8 %
EOSINOPHILS ABSOLUTE: 0.1 THOU/MM3 (ref 0–0.4)
FLU A ANTIGEN: NEGATIVE
FLU B ANTIGEN: NEGATIVE
GFR SERPL CREATININE-BSD FRML MDRD: > 90 ML/MIN/1.73M2
GLUCOSE BLD-MCNC: 79 MG/DL (ref 70–108)
GROUP A STREP CULTURE, REFLEX: NEGATIVE
HCT VFR BLD CALC: 43.6 % (ref 42–52)
HEMOGLOBIN: 15 GM/DL (ref 14–18)
LYMPHOCYTES # BLD: 13.1 %
LYMPHOCYTES ABSOLUTE: 0.9 THOU/MM3 (ref 1–4.8)
MCH RBC QN AUTO: 33.2 PG (ref 27–31)
MCHC RBC AUTO-ENTMCNC: 34.5 GM/DL (ref 33–37)
MCV RBC AUTO: 96.3 FL (ref 80–94)
MONOCYTES # BLD: 7.6 %
MONOCYTES ABSOLUTE: 0.5 THOU/MM3 (ref 0.4–1.3)
NUCLEATED RED BLOOD CELLS: 0 /100 WBC
OSMOLALITY CALCULATION: 280.2 MOSMOL/KG (ref 275–300)
PDW BLD-RTO: 13.5 % (ref 11.5–14.5)
PLATELET # BLD: 212 THOU/MM3 (ref 130–400)
PMV BLD AUTO: 7.1 FL (ref 7.4–10.4)
POTASSIUM SERPL-SCNC: 4.3 MEQ/L (ref 3.5–5.2)
RBC # BLD: 4.53 MILL/MM3 (ref 4.7–6.1)
REFLEX THROAT C + S: NORMAL
SEG NEUTROPHILS: 77.8 %
SEGMENTED NEUTROPHILS ABSOLUTE COUNT: 5.1 THOU/MM3 (ref 1.8–7.7)
SODIUM BLD-SCNC: 140 MEQ/L (ref 135–145)
WBC # BLD: 6.5 THOU/MM3 (ref 4.8–10.8)

## 2018-04-13 PROCEDURE — 71046 X-RAY EXAM CHEST 2 VIEWS: CPT

## 2018-04-13 PROCEDURE — 87880 STREP A ASSAY W/OPTIC: CPT

## 2018-04-13 PROCEDURE — 80048 BASIC METABOLIC PNL TOTAL CA: CPT

## 2018-04-13 PROCEDURE — 36415 COLL VENOUS BLD VENIPUNCTURE: CPT

## 2018-04-13 PROCEDURE — 85025 COMPLETE CBC W/AUTO DIFF WBC: CPT

## 2018-04-13 PROCEDURE — 99283 EMERGENCY DEPT VISIT LOW MDM: CPT

## 2018-04-13 PROCEDURE — 87070 CULTURE OTHR SPECIMN AEROBIC: CPT

## 2018-04-13 PROCEDURE — 87804 INFLUENZA ASSAY W/OPTIC: CPT

## 2018-04-13 RX ORDER — AZITHROMYCIN 250 MG/1
TABLET, FILM COATED ORAL
Qty: 1 PACKET | Refills: 0 | Status: SHIPPED | OUTPATIENT
Start: 2018-04-13 | End: 2018-04-23

## 2018-04-13 ASSESSMENT — ENCOUNTER SYMPTOMS
RHINORRHEA: 0
COUGH: 1
VOMITING: 0
DIARRHEA: 0
NAUSEA: 0
ABDOMINAL PAIN: 0
BACK PAIN: 0
WHEEZING: 0
SHORTNESS OF BREATH: 0
EYE REDNESS: 0
SORE THROAT: 1
EYE DISCHARGE: 0

## 2018-04-13 ASSESSMENT — PAIN DESCRIPTION - FREQUENCY: FREQUENCY: CONTINUOUS

## 2018-04-13 ASSESSMENT — PAIN DESCRIPTION - LOCATION: LOCATION: GENERALIZED

## 2018-04-13 ASSESSMENT — PAIN DESCRIPTION - DESCRIPTORS: DESCRIPTORS: ACHING

## 2018-04-13 ASSESSMENT — PAIN SCALES - GENERAL: PAINLEVEL_OUTOF10: 7

## 2018-04-15 LAB — THROAT/NOSE CULTURE: NORMAL

## 2018-05-23 ENCOUNTER — APPOINTMENT (OUTPATIENT)
Dept: GENERAL RADIOLOGY | Age: 53
End: 2018-05-23
Payer: MEDICAID

## 2018-05-23 ENCOUNTER — HOSPITAL ENCOUNTER (EMERGENCY)
Age: 53
Discharge: HOME | End: 2018-05-23
Payer: MEDICAID

## 2018-05-23 VITALS
BODY MASS INDEX: 27.26 KG/M2 | DIASTOLIC BLOOD PRESSURE: 80 MMHG | RESPIRATION RATE: 14 BRPM | TEMPERATURE: 98.3 F | WEIGHT: 190 LBS | SYSTOLIC BLOOD PRESSURE: 134 MMHG | HEART RATE: 83 BPM | OXYGEN SATURATION: 98 %

## 2018-05-23 DIAGNOSIS — G89.29 ACUTE EXACERBATION OF CHRONIC LOW BACK PAIN: Primary | ICD-10-CM

## 2018-05-23 DIAGNOSIS — S46.911A STRAIN OF RIGHT SHOULDER, INITIAL ENCOUNTER: ICD-10-CM

## 2018-05-23 DIAGNOSIS — M54.50 ACUTE EXACERBATION OF CHRONIC LOW BACK PAIN: Primary | ICD-10-CM

## 2018-05-23 PROCEDURE — 73030 X-RAY EXAM OF SHOULDER: CPT

## 2018-05-23 PROCEDURE — 6370000000 HC RX 637 (ALT 250 FOR IP): Performed by: PHYSICIAN ASSISTANT

## 2018-05-23 PROCEDURE — 99283 EMERGENCY DEPT VISIT LOW MDM: CPT

## 2018-05-23 RX ORDER — TRAMADOL HYDROCHLORIDE 50 MG/1
50 TABLET ORAL ONCE
Status: COMPLETED | OUTPATIENT
Start: 2018-05-23 | End: 2018-05-23

## 2018-05-23 RX ADMIN — TRAMADOL HYDROCHLORIDE 50 MG: 50 TABLET, FILM COATED ORAL at 17:08

## 2018-05-23 ASSESSMENT — ENCOUNTER SYMPTOMS
BACK PAIN: 1
DIARRHEA: 0
ABDOMINAL PAIN: 0
SORE THROAT: 0
NAUSEA: 0
COUGH: 0
VOMITING: 0
EYE REDNESS: 0
RHINORRHEA: 0
WHEEZING: 0
SHORTNESS OF BREATH: 0
EYE DISCHARGE: 0

## 2018-05-23 ASSESSMENT — PAIN DESCRIPTION - ORIENTATION: ORIENTATION: RIGHT

## 2018-05-23 ASSESSMENT — PAIN SCALES - GENERAL
PAINLEVEL_OUTOF10: 8
PAINLEVEL_OUTOF10: 8

## 2018-05-23 ASSESSMENT — PAIN DESCRIPTION - DESCRIPTORS: DESCRIPTORS: ACHING

## 2018-05-23 ASSESSMENT — PAIN DESCRIPTION - LOCATION: LOCATION: SHOULDER;BACK

## 2018-05-23 ASSESSMENT — PAIN DESCRIPTION - PAIN TYPE: TYPE: ACUTE PAIN;CHRONIC PAIN

## 2018-06-13 ENCOUNTER — TELEPHONE (OUTPATIENT)
Dept: INTERNAL MEDICINE CLINIC | Age: 53
End: 2018-06-13

## 2018-06-13 ENCOUNTER — OFFICE VISIT (OUTPATIENT)
Dept: INTERNAL MEDICINE CLINIC | Age: 53
End: 2018-06-13

## 2018-06-13 VITALS
HEIGHT: 70 IN | WEIGHT: 176 LBS | BODY MASS INDEX: 25.2 KG/M2 | HEART RATE: 78 BPM | DIASTOLIC BLOOD PRESSURE: 100 MMHG | SYSTOLIC BLOOD PRESSURE: 172 MMHG

## 2018-06-13 DIAGNOSIS — Z23 NEED FOR PNEUMOCOCCAL VACCINE: ICD-10-CM

## 2018-06-13 DIAGNOSIS — Z72.0 TOBACCO ABUSE: ICD-10-CM

## 2018-06-13 DIAGNOSIS — Z23 NEED FOR TDAP VACCINATION: ICD-10-CM

## 2018-06-13 DIAGNOSIS — M54.2 NECK PAIN: ICD-10-CM

## 2018-06-13 DIAGNOSIS — S46.911D STRAIN OF RIGHT SHOULDER, SUBSEQUENT ENCOUNTER: Primary | ICD-10-CM

## 2018-06-13 PROCEDURE — 90472 IMMUNIZATION ADMIN EACH ADD: CPT | Performed by: NURSE PRACTITIONER

## 2018-06-13 PROCEDURE — 90732 PPSV23 VACC 2 YRS+ SUBQ/IM: CPT | Performed by: NURSE PRACTITIONER

## 2018-06-13 PROCEDURE — 99203 OFFICE O/P NEW LOW 30 MIN: CPT | Performed by: NURSE PRACTITIONER

## 2018-06-13 PROCEDURE — 90715 TDAP VACCINE 7 YRS/> IM: CPT | Performed by: NURSE PRACTITIONER

## 2018-06-13 PROCEDURE — 90471 IMMUNIZATION ADMIN: CPT | Performed by: NURSE PRACTITIONER

## 2018-06-13 RX ORDER — PREDNISONE 20 MG/1
TABLET ORAL
Qty: 18 TABLET | Refills: 0 | Status: SHIPPED | OUTPATIENT
Start: 2018-06-13 | End: 2018-07-28

## 2018-06-13 RX ORDER — IBUPROFEN 200 MG
400 TABLET ORAL EVERY 6 HOURS PRN
COMMUNITY
End: 2018-06-13

## 2018-06-13 RX ORDER — MELOXICAM 15 MG/1
15 TABLET ORAL DAILY PRN
Qty: 30 TABLET | Refills: 0 | Status: SHIPPED | OUTPATIENT
Start: 2018-06-13 | End: 2018-07-28

## 2018-06-13 ASSESSMENT — ENCOUNTER SYMPTOMS
SHORTNESS OF BREATH: 0
ABDOMINAL PAIN: 0
DIARRHEA: 0
CHEST TIGHTNESS: 0
BACK PAIN: 1
ABDOMINAL DISTENTION: 0
COUGH: 0
EYE PAIN: 0
BLOOD IN STOOL: 0
NAUSEA: 0
VOMITING: 0
CONSTIPATION: 0
PHOTOPHOBIA: 0
COLOR CHANGE: 0

## 2018-07-28 ENCOUNTER — HOSPITAL ENCOUNTER (INPATIENT)
Age: 53
LOS: 3 days | Discharge: HOME | DRG: 897 | End: 2018-07-31
Attending: PSYCHIATRY & NEUROLOGY | Admitting: PSYCHIATRY & NEUROLOGY

## 2018-07-28 DIAGNOSIS — F32.A DEPRESSION, UNSPECIFIED DEPRESSION TYPE: Primary | ICD-10-CM

## 2018-07-28 PROBLEM — F19.94 SUBSTANCE INDUCED MOOD DISORDER (HCC): Status: ACTIVE | Noted: 2018-07-28

## 2018-07-28 LAB
ALBUMIN SERPL-MCNC: 3.6 G/DL (ref 3.5–5.1)
ALP BLD-CCNC: 42 U/L (ref 38–126)
ALT SERPL-CCNC: 23 U/L (ref 11–66)
AMPHETAMINE+METHAMPHETAMINE URINE SCREEN: NEGATIVE
ANION GAP SERPL CALCULATED.3IONS-SCNC: 14 MEQ/L (ref 8–16)
AST SERPL-CCNC: 38 U/L (ref 5–40)
BACTERIA: ABNORMAL /HPF
BARBITURATE QUANTITATIVE URINE: NEGATIVE
BASOPHILS # BLD: 0.5 %
BASOPHILS ABSOLUTE: 0 THOU/MM3 (ref 0–0.1)
BENZODIAZEPINE QUANTITATIVE URINE: NEGATIVE
BILIRUB SERPL-MCNC: 0.4 MG/DL (ref 0.3–1.2)
BILIRUBIN DIRECT: < 0.2 MG/DL (ref 0–0.3)
BILIRUBIN URINE: NEGATIVE
BLOOD, URINE: ABNORMAL
BUN BLDV-MCNC: 24 MG/DL (ref 7–22)
CALCIUM SERPL-MCNC: 9.2 MG/DL (ref 8.5–10.5)
CANNABINOID QUANTITATIVE URINE: POSITIVE
CASTS 2: ABNORMAL /LPF
CASTS UA: ABNORMAL /LPF
CHARACTER, URINE: CLEAR
CHLORIDE BLD-SCNC: 106 MEQ/L (ref 98–111)
CO2: 20 MEQ/L (ref 23–33)
COCAINE METABOLITE QUANTITATIVE URINE: POSITIVE
COLOR: YELLOW
CREAT SERPL-MCNC: 0.9 MG/DL (ref 0.4–1.2)
CRYSTALS, UA: ABNORMAL
EOSINOPHIL # BLD: 2.9 %
EOSINOPHILS ABSOLUTE: 0.2 THOU/MM3 (ref 0–0.4)
EPITHELIAL CELLS, UA: ABNORMAL /HPF
ERYTHROCYTE [DISTWIDTH] IN BLOOD BY AUTOMATED COUNT: 12.5 % (ref 11.5–14.5)
ERYTHROCYTE [DISTWIDTH] IN BLOOD BY AUTOMATED COUNT: 46.6 FL (ref 35–45)
ETHYL ALCOHOL, SERUM: < 0.01 %
GFR SERPL CREATININE-BSD FRML MDRD: > 90 ML/MIN/1.73M2
GLUCOSE BLD-MCNC: 84 MG/DL (ref 70–108)
GLUCOSE URINE: NEGATIVE MG/DL
HCT VFR BLD CALC: 41.3 % (ref 42–52)
HEMOGLOBIN: 13.5 GM/DL (ref 14–18)
IMMATURE GRANS (ABS): 0.01 THOU/MM3 (ref 0–0.07)
IMMATURE GRANULOCYTES: 0.2 %
KETONES, URINE: NEGATIVE
LEUKOCYTE ESTERASE, URINE: NEGATIVE
LYMPHOCYTES # BLD: 39.2 %
LYMPHOCYTES ABSOLUTE: 2.2 THOU/MM3 (ref 1–4.8)
MAGNESIUM: 2 MG/DL (ref 1.6–2.4)
MCH RBC QN AUTO: 32.8 PG (ref 26–33)
MCHC RBC AUTO-ENTMCNC: 32.7 GM/DL (ref 32.2–35.5)
MCV RBC AUTO: 100.5 FL (ref 80–94)
MISCELLANEOUS 2: ABNORMAL
MONOCYTES # BLD: 9.9 %
MONOCYTES ABSOLUTE: 0.6 THOU/MM3 (ref 0.4–1.3)
NITRITE, URINE: NEGATIVE
NUCLEATED RED BLOOD CELLS: 0 /100 WBC
OPIATES, URINE: NEGATIVE
OSMOLALITY CALCULATION: 282.6 MOSMOL/KG (ref 275–300)
OXYCODONE: NEGATIVE
PH UA: 6
PHENCYCLIDINE QUANTITATIVE URINE: NEGATIVE
PLATELET # BLD: 210 THOU/MM3 (ref 130–400)
PMV BLD AUTO: 8.6 FL (ref 9.4–12.4)
POTASSIUM SERPL-SCNC: 4.1 MEQ/L (ref 3.5–5.2)
PROTEIN UA: NEGATIVE
RBC # BLD: 4.11 MILL/MM3 (ref 4.7–6.1)
RBC URINE: ABNORMAL /HPF
RENAL EPITHELIAL, UA: ABNORMAL
SEG NEUTROPHILS: 47.3 %
SEGMENTED NEUTROPHILS ABSOLUTE COUNT: 2.6 THOU/MM3 (ref 1.8–7.7)
SODIUM BLD-SCNC: 140 MEQ/L (ref 135–145)
SPECIFIC GRAVITY, URINE: 1.02 (ref 1–1.03)
TOTAL PROTEIN: 6.7 G/DL (ref 6.1–8)
UROBILINOGEN, URINE: 0.2 EU/DL
WBC # BLD: 5.6 THOU/MM3 (ref 4.8–10.8)
WBC UA: ABNORMAL /HPF
YEAST: ABNORMAL

## 2018-07-28 PROCEDURE — 6370000000 HC RX 637 (ALT 250 FOR IP): Performed by: PSYCHIATRY & NEUROLOGY

## 2018-07-28 PROCEDURE — 1240000000 HC EMOTIONAL WELLNESS R&B

## 2018-07-28 PROCEDURE — 80307 DRUG TEST PRSMV CHEM ANLYZR: CPT

## 2018-07-28 PROCEDURE — 99285 EMERGENCY DEPT VISIT HI MDM: CPT

## 2018-07-28 PROCEDURE — 36415 COLL VENOUS BLD VENIPUNCTURE: CPT

## 2018-07-28 PROCEDURE — 99223 1ST HOSP IP/OBS HIGH 75: CPT | Performed by: PSYCHIATRY & NEUROLOGY

## 2018-07-28 PROCEDURE — G0480 DRUG TEST DEF 1-7 CLASSES: HCPCS

## 2018-07-28 PROCEDURE — 81001 URINALYSIS AUTO W/SCOPE: CPT

## 2018-07-28 PROCEDURE — 83735 ASSAY OF MAGNESIUM: CPT

## 2018-07-28 PROCEDURE — 80053 COMPREHEN METABOLIC PANEL: CPT

## 2018-07-28 PROCEDURE — 82248 BILIRUBIN DIRECT: CPT

## 2018-07-28 PROCEDURE — 85025 COMPLETE CBC W/AUTO DIFF WBC: CPT

## 2018-07-28 RX ORDER — FOLIC ACID 1 MG/1
1 TABLET ORAL DAILY
Status: DISCONTINUED | OUTPATIENT
Start: 2018-07-28 | End: 2018-07-31 | Stop reason: HOSPADM

## 2018-07-28 RX ORDER — THIAMINE MONONITRATE (VIT B1) 100 MG
100 TABLET ORAL DAILY
Status: DISCONTINUED | OUTPATIENT
Start: 2018-07-28 | End: 2018-07-31 | Stop reason: HOSPADM

## 2018-07-28 RX ORDER — HYDROXYZINE PAMOATE 25 MG/1
25 CAPSULE ORAL 3 TIMES DAILY PRN
Status: DISCONTINUED | OUTPATIENT
Start: 2018-07-28 | End: 2018-07-31 | Stop reason: HOSPADM

## 2018-07-28 RX ORDER — ACETAMINOPHEN 325 MG/1
650 TABLET ORAL EVERY 4 HOURS PRN
Status: DISCONTINUED | OUTPATIENT
Start: 2018-07-28 | End: 2018-07-30

## 2018-07-28 RX ORDER — LORAZEPAM 1 MG/1
1 TABLET ORAL EVERY 4 HOURS PRN
Status: DISCONTINUED | OUTPATIENT
Start: 2018-07-28 | End: 2018-07-31 | Stop reason: HOSPADM

## 2018-07-28 RX ORDER — TRAZODONE HYDROCHLORIDE 50 MG/1
50 TABLET ORAL NIGHTLY PRN
Status: DISCONTINUED | OUTPATIENT
Start: 2018-07-28 | End: 2018-07-28

## 2018-07-28 RX ORDER — MAGNESIUM HYDROXIDE/ALUMINUM HYDROXICE/SIMETHICONE 120; 1200; 1200 MG/30ML; MG/30ML; MG/30ML
30 SUSPENSION ORAL PRN
Status: DISCONTINUED | OUTPATIENT
Start: 2018-07-28 | End: 2018-07-31 | Stop reason: HOSPADM

## 2018-07-28 RX ORDER — MULTIVITAMIN WITH FOLIC ACID 400 MCG
1 TABLET ORAL DAILY
Status: DISCONTINUED | OUTPATIENT
Start: 2018-07-29 | End: 2018-07-31 | Stop reason: HOSPADM

## 2018-07-28 RX ORDER — CITALOPRAM 20 MG/1
10 TABLET ORAL DAILY
Status: DISCONTINUED | OUTPATIENT
Start: 2018-07-28 | End: 2018-07-31 | Stop reason: HOSPADM

## 2018-07-28 RX ORDER — ARIPIPRAZOLE 5 MG/1
5 TABLET ORAL DAILY
Status: DISCONTINUED | OUTPATIENT
Start: 2018-07-28 | End: 2018-07-31 | Stop reason: HOSPADM

## 2018-07-28 RX ADMIN — FOLIC ACID 1 MG: 1 TABLET ORAL at 16:31

## 2018-07-28 RX ADMIN — Medication 100 MG: at 16:30

## 2018-07-28 RX ADMIN — ARIPIPRAZOLE 5 MG: 5 TABLET ORAL at 16:31

## 2018-07-28 RX ADMIN — CITALOPRAM 10 MG: 20 TABLET, FILM COATED ORAL at 20:54

## 2018-07-28 ASSESSMENT — SLEEP AND FATIGUE QUESTIONNAIRES
DO YOU HAVE DIFFICULTY SLEEPING: YES
AVERAGE NUMBER OF SLEEP HOURS: 5
DIFFICULTY ARISING: NO
RESTFUL SLEEP: NO
DO YOU HAVE DIFFICULTY SLEEPING: YES
DIFFICULTY FALLING ASLEEP: YES
SLEEP PATTERN: DIFFICULTY FALLING ASLEEP
SLEEP PATTERN: DIFFICULTY FALLING ASLEEP
DIFFICULTY FALLING ASLEEP: YES
RESTFUL SLEEP: NO
DO YOU USE A SLEEP AID: NO
DIFFICULTY ARISING: NO
DO YOU USE A SLEEP AID: NO
DIFFICULTY STAYING ASLEEP: YES
AVERAGE NUMBER OF SLEEP HOURS: 3
DIFFICULTY STAYING ASLEEP: YES

## 2018-07-28 ASSESSMENT — ENCOUNTER SYMPTOMS
SORE THROAT: 0
DIARRHEA: 0
COUGH: 0
NAUSEA: 0
EYE DISCHARGE: 0
SHORTNESS OF BREATH: 0
VOMITING: 0
WHEEZING: 0
RHINORRHEA: 0
ABDOMINAL PAIN: 0
BACK PAIN: 0
EYE REDNESS: 0

## 2018-07-28 ASSESSMENT — LIFESTYLE VARIABLES
HISTORY_ALCOHOL_USE: YES
HISTORY_ALCOHOL_USE: YES

## 2018-07-28 NOTE — ED PROVIDER NOTES
tuberculosis. SURGICAL HISTORY      has a past surgical history that includes Lung biopsy () and back surgery (). CURRENT MEDICATIONS       Current Discharge Medication List          ALLERGIES     has No Known Allergies. FAMILY HISTORY     indicated that his mother is alive. He indicated that his father is alive. He indicated that his maternal grandmother is . family history includes Cancer in his maternal grandmother; Diabetes in his father and mother. SOCIAL HISTORY      reports that he has been smoking Cigarettes. He has a 12.50 pack-year smoking history. He has never used smokeless tobacco. He reports that he drinks about 3.6 oz of alcohol per week . He reports that he uses drugs, including Marijuana and Cocaine. PHYSICAL EXAM     INITIAL VITALS:  height is 5' 10\" (1.778 m) and weight is 185 lb (83.9 kg). His oral temperature is 98 °F (36.7 °C). His blood pressure is 137/68 and his pulse is 58. His respiration is 18 and oxygen saturation is 98%. Physical Exam   Constitutional: He is oriented to person, place, and time. He appears well-developed and well-nourished. HENT:   Head: Normocephalic and atraumatic. Right Ear: External ear normal.   Left Ear: External ear normal.   Eyes: Conjunctivae are normal. Right eye exhibits no discharge. Left eye exhibits no discharge. No scleral icterus. Neck: Normal range of motion. Neck supple. No JVD present. Cardiovascular: Normal rate, regular rhythm and normal heart sounds. Pulmonary/Chest: Effort normal and breath sounds normal. No stridor. No respiratory distress. He has no decreased breath sounds. He has no wheezes. He has no rhonchi. He has no rales. Abdominal: Soft. He exhibits no distension. Musculoskeletal: Normal range of motion. He exhibits no edema. Neurological: He is alert and oriented to person, place, and time. He exhibits normal muscle tone. GCS eye subscore is 4. GCS verbal subscore is 5.  GCS motor

## 2018-07-28 NOTE — H&P
DAILY LIVING:  Patient is able to perform all instrumental activities of daily living. MARITAL STATUS:   OCCUPATION:  Employed as   LEVEL OF EDUCATION:  Otto Isabel  Patient currently lives with family     Family History:   Family History   Problem Relation Age of Onset    Diabetes Mother     Diabetes Father     Cancer Maternal Grandmother         cervical     Psychiatric Family History  No family history        PHYSICAL EXAM:    Vitals:  /68   Pulse 58   Temp 98 °F (36.7 °C) (Oral)   Resp 18   Ht 5' 10\" (1.778 m)   Wt 185 lb (83.9 kg)   SpO2 98%   BMI 26.54 kg/m²     Mental Status Examination:  Level of consciousness:  Mild dysattention (reduced clarity of awareness with impaired ability to focus, sustain, or shift attention)  Appearance:  hospital attire  Behavior/Motor:  psychomotor retardation  Attitude toward examiner:  cooperative, attentive and good eye contact  Speech:  slow  Mood:  depressed  Affect:  mood congruent  Thought processes:  slow  Thought content:  Homocidal ideation denies  Suicidal Ideation:  active  Delusions:  paranoid  Perceptual Disturbance:  auditory  Cognition:  oriented to person, place, and time  Concentration failed 5-letter word backwards  Memory impaired immediate recall  Insight:  poor  Judgment:  poor        DSM-IV DIAGNOSIS:    Impression    (Axis I):        Bipolar I disorder; depressed episode  Cocaine Use Disorder      ASSESSMENT AND PLAN:    Admit to psych unit  Labs and EKG  Resume and adjust medications accordingly  Milieu therapy

## 2018-07-29 PROCEDURE — 99231 SBSQ HOSP IP/OBS SF/LOW 25: CPT | Performed by: NURSE PRACTITIONER

## 2018-07-29 PROCEDURE — 1240000000 HC EMOTIONAL WELLNESS R&B

## 2018-07-29 PROCEDURE — 6370000000 HC RX 637 (ALT 250 FOR IP): Performed by: PSYCHIATRY & NEUROLOGY

## 2018-07-29 RX ADMIN — Medication 100 MG: at 08:48

## 2018-07-29 RX ADMIN — THERA TABS 1 TABLET: TAB at 08:48

## 2018-07-29 RX ADMIN — CITALOPRAM 10 MG: 20 TABLET, FILM COATED ORAL at 08:48

## 2018-07-29 RX ADMIN — ARIPIPRAZOLE 5 MG: 5 TABLET ORAL at 08:48

## 2018-07-29 RX ADMIN — FOLIC ACID 1 MG: 1 TABLET ORAL at 08:48

## 2018-07-29 RX ADMIN — ACETAMINOPHEN 650 MG: 325 TABLET ORAL at 20:45

## 2018-07-29 RX ADMIN — ACETAMINOPHEN 650 MG: 325 TABLET ORAL at 08:47

## 2018-07-29 RX ADMIN — TRAZODONE HYDROCHLORIDE 150 MG: 100 TABLET ORAL at 20:31

## 2018-07-29 ASSESSMENT — PAIN DESCRIPTION - ORIENTATION: ORIENTATION: LOWER

## 2018-07-29 ASSESSMENT — PAIN SCALES - GENERAL
PAINLEVEL_OUTOF10: 3
PAINLEVEL_OUTOF10: 8
PAINLEVEL_OUTOF10: 10
PAINLEVEL_OUTOF10: 8

## 2018-07-29 ASSESSMENT — PAIN DESCRIPTION - DESCRIPTORS: DESCRIPTORS: ACHING

## 2018-07-29 ASSESSMENT — PAIN DESCRIPTION - FREQUENCY: FREQUENCY: INTERMITTENT

## 2018-07-29 ASSESSMENT — PAIN DESCRIPTION - PAIN TYPE: TYPE: ACUTE PAIN

## 2018-07-29 ASSESSMENT — PAIN DESCRIPTION - LOCATION: LOCATION: BACK

## 2018-07-30 PROCEDURE — 1240000000 HC EMOTIONAL WELLNESS R&B

## 2018-07-30 PROCEDURE — 6370000000 HC RX 637 (ALT 250 FOR IP): Performed by: PSYCHIATRY & NEUROLOGY

## 2018-07-30 PROCEDURE — 99232 SBSQ HOSP IP/OBS MODERATE 35: CPT | Performed by: NURSE PRACTITIONER

## 2018-07-30 RX ORDER — IBUPROFEN 400 MG/1
400 TABLET ORAL EVERY 6 HOURS PRN
Status: DISCONTINUED | OUTPATIENT
Start: 2018-07-30 | End: 2018-07-31 | Stop reason: HOSPADM

## 2018-07-30 RX ADMIN — FOLIC ACID 1 MG: 1 TABLET ORAL at 08:51

## 2018-07-30 RX ADMIN — Medication 100 MG: at 08:51

## 2018-07-30 RX ADMIN — THERA TABS 1 TABLET: TAB at 08:51

## 2018-07-30 RX ADMIN — TRAZODONE HYDROCHLORIDE 150 MG: 100 TABLET ORAL at 20:41

## 2018-07-30 RX ADMIN — ARIPIPRAZOLE 5 MG: 5 TABLET ORAL at 08:51

## 2018-07-30 RX ADMIN — CITALOPRAM 10 MG: 20 TABLET, FILM COATED ORAL at 08:51

## 2018-07-30 ASSESSMENT — PAIN SCALES - GENERAL: PAINLEVEL_OUTOF10: 0

## 2018-07-30 NOTE — BH NOTE
Group Therapy Note    Date: 7/28/2018  Start Time: 1630  End Time:  1700  Number of Participants: 7    Type of Group: Healthy Living/Wellness    Notes:  Patient did not attend group.     Discipline Responsible: Licensed Practical Nurse    Signature:  Abdelrahman Gil LPN
Pt did not participate in 0930 Recreational activities session offered today following maximum verbal encouragement. Pt remained resting in room.
VISION:   No impairments noted at this time   VERBAL COMMUNICATION:  No impairments noted at this time   WRITTEN COMMUNICATION:  No impairments noted at this time     COORDINATION:  No impairments noted at this time   MOBILITY:   Pt ambulates independently   GOALS: Pt to increase socialization and knowledge of coping skills through participation in group therapy sessions following maximum verbal encouragement from staff.
envelope, number:  [id number]. Patient's home medications were reviewed. Patient oriented to surroundings and program expectations and copy of patient rights given. Received admission packet:  yes. Consents reviewed, signed yes. \"An Important Message from [place] About Your Rights\" form reviewed, signed yes. Refused no. Patient verbalize understanding:  yes. Patient education on precautions: yes           Provided pt with [employer] handout entitled \"Quitting Smoking. \"  Reviewed handout with pt addressing dangers of smoking, developing coping skills, and providing basic information about quitting. Pt response to counseling:  \"I smoke, but I don't need a patch. \"    Patient alert and oriented X3. Denies pain or discomfort. Romelia Munguia does feel suicidal but will contract for safety. Currently denies hallucinations or delusions.         William Bustos RN

## 2018-07-31 VITALS
OXYGEN SATURATION: 98 % | WEIGHT: 185 LBS | TEMPERATURE: 96.7 F | SYSTOLIC BLOOD PRESSURE: 138 MMHG | RESPIRATION RATE: 16 BRPM | BODY MASS INDEX: 26.48 KG/M2 | DIASTOLIC BLOOD PRESSURE: 89 MMHG | HEART RATE: 60 BPM | HEIGHT: 70 IN

## 2018-07-31 PROCEDURE — 99238 HOSP IP/OBS DSCHRG MGMT 30/<: CPT | Performed by: PSYCHIATRY & NEUROLOGY

## 2018-07-31 PROCEDURE — 6370000000 HC RX 637 (ALT 250 FOR IP): Performed by: NURSE PRACTITIONER

## 2018-07-31 PROCEDURE — 5130000000 HC BRIDGE APPOINTMENT

## 2018-07-31 PROCEDURE — 6370000000 HC RX 637 (ALT 250 FOR IP): Performed by: PSYCHIATRY & NEUROLOGY

## 2018-07-31 RX ORDER — CITALOPRAM 10 MG/1
10 TABLET ORAL DAILY
Qty: 30 TABLET | Refills: 1 | Status: ON HOLD | OUTPATIENT
Start: 2018-07-31 | End: 2021-04-13 | Stop reason: HOSPADM

## 2018-07-31 RX ORDER — TRAZODONE HYDROCHLORIDE 150 MG/1
150 TABLET ORAL NIGHTLY PRN
Qty: 30 TABLET | Refills: 0 | Status: ON HOLD | OUTPATIENT
Start: 2018-07-31 | End: 2021-04-13 | Stop reason: HOSPADM

## 2018-07-31 RX ORDER — HYDROXYZINE PAMOATE 25 MG/1
25 CAPSULE ORAL 3 TIMES DAILY PRN
Qty: 90 CAPSULE | Refills: 0 | Status: SHIPPED | OUTPATIENT
Start: 2018-07-31 | End: 2018-08-30

## 2018-07-31 RX ORDER — ARIPIPRAZOLE 5 MG/1
5 TABLET ORAL DAILY
Qty: 30 TABLET | Refills: 1 | Status: ON HOLD | OUTPATIENT
Start: 2018-07-31 | End: 2021-04-13 | Stop reason: HOSPADM

## 2018-07-31 RX ADMIN — CITALOPRAM 10 MG: 20 TABLET, FILM COATED ORAL at 08:16

## 2018-07-31 RX ADMIN — IBUPROFEN 400 MG: 400 TABLET ORAL at 08:17

## 2018-07-31 RX ADMIN — ARIPIPRAZOLE 5 MG: 5 TABLET ORAL at 08:16

## 2018-07-31 RX ADMIN — Medication 100 MG: at 08:16

## 2018-07-31 RX ADMIN — THERA TABS 1 TABLET: TAB at 08:16

## 2018-07-31 RX ADMIN — FOLIC ACID 1 MG: 1 TABLET ORAL at 08:16

## 2018-07-31 ASSESSMENT — PAIN SCALES - GENERAL
PAINLEVEL_OUTOF10: 8
PAINLEVEL_OUTOF10: 8

## 2018-07-31 ASSESSMENT — PAIN DESCRIPTION - LOCATION: LOCATION: BACK

## 2018-07-31 ASSESSMENT — PAIN DESCRIPTION - PAIN TYPE: TYPE: CHRONIC PAIN

## 2018-07-31 ASSESSMENT — PAIN DESCRIPTION - FREQUENCY: FREQUENCY: INTERMITTENT

## 2018-07-31 NOTE — PLAN OF CARE
Problem: Discharge Planning:  Goal: Discharged to appropriate level of care  Discharged to appropriate level of care   Outcome: Ongoing  Discharge planning is on going. Problem: Activity:  Goal: Sleeping patterns will improve  Sleeping patterns will improve   Outcome: Ongoing  Sleeping patterns are improving. Problem: Altered Mood, Depressive Behavior:  Goal: Able to verbalize acceptance of life and situations over which he or she has no control  Able to verbalize acceptance of life and situations over which he or she has no control   Outcome: Ongoing  Patient is able to verbalize acceptance of life and situations over which he or she has no control. Goal: Able to verbalize and/or display a decrease in depressive symptoms  Able to verbalize and/or display a decrease in depressive symptoms   Outcome: Ongoing    Goal: Ability to disclose and discuss suicidal ideas will improve  Ability to disclose and discuss suicidal ideas will improve   Outcome: Ongoing  Patient is able to deny suicidal ideations. Goal: Able to verbalize support systems  Able to verbalize support systems   Outcome: Ongoing  Patient is   able to verbalize that they have support system. Goal: Absence of self-harm  Absence of self-harm   Outcome: Ongoing  Absence of self-harm to patient noted this shift. Goal: Patient specific goal  Patient specific goal   Outcome: Ongoing  Patient reports continuing to work towards goal.  Goal: Participates in care planning  Participates in care planning   Outcome: Ongoing  Patient is able and willing to participate in care planning.     Problem: Altered Mood, Psychotic Behavior:  Goal: Able to demonstrate trust by eating, participating in treatment and following staff's direction  Able to demonstrate trust by eating, participating in treatment and following staff's direction   Outcome: Ongoing    Goal: Ability to interact with others will improve  Ability to interact with others will improve   Outcome:
Problem: Discharge Planning:  Goal: Discharged to appropriate level of care  Discharged to appropriate level of care   Outcome: Ongoing  Patient will be discharged to safe, appropriate level of care. Patient will work with interdisciplinary team towards meeting discharge needs. Problem: Activity:  Goal: Sleeping patterns will improve  Sleeping patterns will improve   Outcome: Met This Shift  Staff reports patient slept 8 hours last night. States he feels rested. Problem: Altered Mood, Depressive Behavior:  Goal: Able to verbalize acceptance of life and situations over which he or she has no control  Able to verbalize acceptance of life and situations over which he or she has no control   Outcome: Ongoing  Continues to work towards accepting life situations over which he has no control. Goal: Able to verbalize and/or display a decrease in depressive symptoms  Able to verbalize and/or display a decrease in depressive symptoms   Outcome: Met This Shift  Denies depression, bright on approach. Out with peers on unit. Goal: Ability to disclose and discuss suicidal ideas will improve  Ability to disclose and discuss suicidal ideas will improve   Outcome: Met This Shift  Denies suicidal thoughts. Reports feels safe on unit. Goal: Able to verbalize support systems  Able to verbalize support systems   Outcome: Met This Shift  Reports his family is his support  Goal: Absence of self-harm  Absence of self-harm   Outcome: Met This Shift  Free from self harm, denies thoughts of self harm. Contracts for safety  Goal: Patient specific goal  Patient specific goal   Outcome: Met This Shift  Reports he did not make a goal today. Goal: Participates in care planning  Participates in care planning   Outcome: Met This Shift  Patient is able to participate in care planning.     Problem: Altered Mood, Psychotic Behavior:  Goal: Able to demonstrate trust by eating, participating in treatment and following staff's direction  Able
Problem: Safety:  Intervention: Develop safety plan  1. What are the warning signs when you begin thinking suicide or when you feel very distressed? Nervous cant do anything right. 2. What can you do by yourself to take your mind off of the problem? [place]ung, Walking, Talking, Resting  3. If you are unable to deal with your distressed mood alone, contact trusted family members or friends. List several people in case your first choices are not available. [place]  4. Contact local professionals or emergency services if you continue to have suicidal thoughts or serious distress.   [address] [place] [address] PHONE NUMBERS:        [phone]        [phone] (for deaf or hearing impaired)
Behavior:  Goal: Able to demonstrate trust by eating, participating in treatment and following staff's direction  Able to demonstrate trust by eating, participating in treatment and following staff's direction   Outcome: Met This Shift  Cooperative. Took meds as ordered. Took snack also. Goal: Ability to interact with others will improve  Ability to interact with others will improve   Outcome: Ongoing  Out with peers watching TV, little to no interaction. Problem: Pain:  Goal: Pain level will decrease  Pain level will decrease   Outcome: Ongoing  Reports low back pain is less after Tylenol. Goal: Control of acute pain  Control of acute pain   Outcome: Met This Shift  Back pain less with Tylenol  Goal: Control of chronic pain  Control of chronic pain   Outcome: Ongoing      Comments: Care plan reviewed with patient. Patient does verbalize understanding of the plan of care and did contribute to goal setting.

## 2018-07-31 NOTE — DISCHARGE SUMMARY
daily  Qty: 30 tablet, Refills: 1      traZODone (DESYREL) 150 MG tablet Take 1 tablet by mouth nightly as needed for Sleep  Qty: 30 tablet, Refills: 0      ARIPiprazole (ABILIFY) 5 MG tablet Take 1 tablet by mouth daily  Qty: 30 tablet, Refills: 1         STOP taking these medications       meloxicam (MOBIC) 15 MG tablet Comments:   Reason for Stopping:         predniSONE (DELTASONE) 20 MG tablet Comments:   Reason for Stopping:          Discharge Exam:  Level of consciousness:  Within normal limits  Appearance: Street clothes, seated, with good grooming  Behavior/Motor: No abnormalities noted  Attitude toward examiner:  Cooperative, attentive, good eye contact  Speech:  spontaneous, normal rate, normal volume and well articulated  Mood:  I feel okay   Affect:  mood congruent  Thought processes:  linear, goal directed and coherent  Thought content:  Homocidal ideation denies  Suicidal Ideation:  denies suicidal ideation  Delusions:  no evidence of delusions  Perceptual Disturbance:  denies any perceptual disturbance  Cognition:  In tact  Memory: age appropriate  Insight & Judgement: fair  Medication side effects:  denies     Disposition: to [facility]     Patient Instructions:     1. Advised to comply with medications as prescribed  2. Folow up with community provider    Follow-up as scheduled with [facility]     Time Spent: 22minutes    Engagement: Patient displayed a good level of engagement with the treatments offered during this admission.      Signed: Electronically signed by Romelia Jaramillo MD on 7/31/2018 at 6:53 AM

## 2018-07-31 NOTE — PROGRESS NOTES
24 hour chart review completed.
[facility] Biopsychosocial Assessment    Current Level of Psychosocial Functioning     Independent xxx  Dependent    Minimal Assist     Comments:      Psychosocial High Risk Factors (check all that apply)    Unable to obtain meds   Chronic illness/pain    Substance abuse xxx  Lack of Family Support   Financial stress xxx  Isolation   Inadequate Community Resources  Suicide attempt(s)  Not taking medications xxx  Victim of crime   Developmental Delay  Unable to manage personal needs    Age 72 or older   Homeless Lives at the [facility] [address]  No transportation   Readmission within 30 days  Unemployment  Traumatic Event    Comments:   Sexual Orientation:  Did not assess    Patient Strengths: Connection to family support, wants treatment    Patient Barriers: Substance abuse, treatment non-compliance    Plan of Care     medication management, group/individual therapies, family meetings, psycho -education, treatment team meetings to assist with stabilization    Initial Discharge Plan:  Patient lives at the [facility] and will resume outpatient treatment at [facility]. Clinical Summary:  Patient is a 46year old male who presents with a history of depression and substance abuse. Patient has not been taking his medication and has been using cocaine, marijuana and alcohol daily leading up to his admission.
Group Therapy Note      Date: 7/30/2018     Start Time: 1630  End Time:  1700      Number of Participants: 4      Type of Group: Healthy Living/Wellness: Me, myself & I: How special we all are       Status After Intervention:  Improved    Participation Level: Refuses to participate in group session. Signature:  Ilia Benitez.  Robe Umaña LMT, LPN, CDP
Group Therapy Note    Date: 7/30/2018  Start Time: 1330  End Time:  1430  Number of Participants: 5    Type of Group: Cognitive Skills    Wellness Binder Information  Module Name:  CBT Skills  Session Number:  NA    Patient's Goal:  NA    Notes:  Pt is present for group. Interacts appropriately with peers. Peer discussion examined feelings of self rejection due to behaviors which have not been consistent with values. Group examined faulty beliefs and identified examples of how they may modify those beliefs. Discussion of coping skills that may be used with a focus on the here and now. Status After Intervention:  Improved    Participation Level:  Active Listener and Interactive    Participation Quality: Appropriate, Attentive, Sharing and Supportive      Speech:  normal      Thought Process/Content: Logical  Linear      Affective Functioning: Flat      Mood: depressed and dysphoric      Level of consciousness:  Alert, Oriented x4 and Attentive      Response to Learning: Able to verbalize current knowledge/experience, Able to verbalize/acknowledge new learning, Able to retain information, Capable of insight, Able to change behavior and Progressing to goal      Endings: None Reported    Modes of Intervention: Education, Support, Socialization, Exploration, Clarifying and Problem-solving      Discipline Responsible: /Counselor      Signature:  TAB [place]
Patient did not attend group and did not set a goal.
Psychiatry Progress Note        7-                    HPI:  The patient is a 46 y.o.  male with significant past medical history of depression who presents with worsening depression and suicidal ideation. He has been off his medications for last few months and started feeling depressed. He relapsed to cocaine and marijuana. Progress:  Mallorie Leavitt reports feeling better since admission. Feels depression is less. Denies feelings of harm towards self or others. Reports meds are working ok without side effects. Good med compliance is reported. Reports appetite is good and slept well last night. Verified slept 8 hours continuous. Denies going to groups yesterday. But states will attend groups today. Denies getting any visits or talking to anyone on the phone. MSE:  Level of consciousness: Alert  Appearance: hospital attire, in chair and fair grooming   Behavior/Motor: no abnormalities noted   Attitude toward examiner: cooperative   Speech: Normal volume, goal directed, NRR  Mood: Euthymic  Affect: Reactive  Thought processes: Linear and goal directed   Suicidal Ideation: Denies suicidal ideations  Homicidal ideation: Denies homicidal ideations  Delusions: No evidence of delusions is observed  Perceptual Disturbance: Denies AH/VH;  No evidence of psychosis is observed. Cognition: Oriented to person, place, time and situation   Concentration fair   Memory intact   Insight: Poor  Judgment: Poor    Assessment:  Bipolar 1 D/O Depressed  Cocaine Use D/O     Plan:  Continue current meds as ordered  Continue to encourage group attendance.       Ilia Snowden, MARILEE  7-
This RN has reviewed and agrees with Jann Page LPN's data collection and has collaborated with this LPN regarding the patient's care plan.
at 6:27 PM
contact you at?  See chart      GOALS UPDATE:   Time frame for Short-Term Goals: daily    EUGENIA Robison

## 2018-08-01 ENCOUNTER — TELEPHONE (OUTPATIENT)
Dept: ADMINISTRATIVE | Age: 53
End: 2018-08-01

## 2018-08-05 ENCOUNTER — APPOINTMENT (OUTPATIENT)
Dept: GENERAL RADIOLOGY | Age: 53
End: 2018-08-05

## 2018-08-05 ENCOUNTER — HOSPITAL ENCOUNTER (EMERGENCY)
Age: 53
Discharge: HOME | End: 2018-08-05
Payer: COMMERCIAL

## 2018-08-05 VITALS
SYSTOLIC BLOOD PRESSURE: 149 MMHG | HEART RATE: 71 BPM | WEIGHT: 184 LBS | OXYGEN SATURATION: 98 % | TEMPERATURE: 98.3 F | HEIGHT: 70 IN | BODY MASS INDEX: 26.34 KG/M2 | RESPIRATION RATE: 16 BRPM | DIASTOLIC BLOOD PRESSURE: 93 MMHG

## 2018-08-05 DIAGNOSIS — F14.10 COCAINE ABUSE (HCC): ICD-10-CM

## 2018-08-05 DIAGNOSIS — R07.9 CHEST PAIN, UNSPECIFIED TYPE: Primary | ICD-10-CM

## 2018-08-05 LAB
ALBUMIN SERPL-MCNC: 3.8 G/DL (ref 3.5–5.1)
ALP BLD-CCNC: 41 U/L (ref 38–126)
ALT SERPL-CCNC: 39 U/L (ref 11–66)
AMPHETAMINE+METHAMPHETAMINE URINE SCREEN: NEGATIVE
ANION GAP SERPL CALCULATED.3IONS-SCNC: 13 MEQ/L (ref 8–16)
AST SERPL-CCNC: 105 U/L (ref 5–40)
BACTERIA: NORMAL
BARBITURATE QUANTITATIVE URINE: NEGATIVE
BASOPHILS # BLD: 0.9 %
BASOPHILS ABSOLUTE: 0.1 THOU/MM3 (ref 0–0.1)
BENZODIAZEPINE QUANTITATIVE URINE: NEGATIVE
BILIRUB SERPL-MCNC: 0.4 MG/DL (ref 0.3–1.2)
BILIRUBIN DIRECT: < 0.2 MG/DL (ref 0–0.3)
BILIRUBIN URINE: NEGATIVE
BLOOD, URINE: NEGATIVE
BUN BLDV-MCNC: 17 MG/DL (ref 7–22)
CALCIUM SERPL-MCNC: 9.3 MG/DL (ref 8.5–10.5)
CANNABINOID QUANTITATIVE URINE: POSITIVE
CASTS: NORMAL /LPF
CASTS: NORMAL /LPF
CHARACTER, URINE: CLEAR
CHLORIDE BLD-SCNC: 103 MEQ/L (ref 98–111)
CO2: 25 MEQ/L (ref 23–33)
COCAINE METABOLITE QUANTITATIVE URINE: POSITIVE
COLOR: YELLOW
CREAT SERPL-MCNC: 1 MG/DL (ref 0.4–1.2)
CRYSTALS: NORMAL
EKG ATRIAL RATE: 86 BPM
EKG P AXIS: 72 DEGREES
EKG P-R INTERVAL: 170 MS
EKG Q-T INTERVAL: 352 MS
EKG QRS DURATION: 90 MS
EKG QTC CALCULATION (BAZETT): 421 MS
EKG R AXIS: 42 DEGREES
EKG T AXIS: 41 DEGREES
EKG VENTRICULAR RATE: 86 BPM
EOSINOPHIL # BLD: 0.3 %
EOSINOPHILS ABSOLUTE: 0 THOU/MM3 (ref 0–0.4)
EPITHELIAL CELLS, UA: NORMAL /HPF
ERYTHROCYTE [DISTWIDTH] IN BLOOD BY AUTOMATED COUNT: 12.4 % (ref 11.5–14.5)
ERYTHROCYTE [DISTWIDTH] IN BLOOD BY AUTOMATED COUNT: 44.8 FL (ref 35–45)
ETHYL ALCOHOL, SERUM: < 0.01 %
GFR SERPL CREATININE-BSD FRML MDRD: > 90 ML/MIN/1.73M2
GLUCOSE BLD-MCNC: 79 MG/DL (ref 70–108)
GLUCOSE, URINE: NEGATIVE MG/DL
HCT VFR BLD CALC: 38.6 % (ref 42–52)
HEMOGLOBIN: 13.1 GM/DL (ref 14–18)
IMMATURE GRANS (ABS): 0.01 THOU/MM3 (ref 0–0.07)
IMMATURE GRANULOCYTES: 0.2 %
KETONES, URINE: NEGATIVE
LEUKOCYTE ESTERASE, URINE: NEGATIVE
LYMPHOCYTES # BLD: 32.3 %
LYMPHOCYTES ABSOLUTE: 1.9 THOU/MM3 (ref 1–4.8)
MCH RBC QN AUTO: 33.4 PG (ref 26–33)
MCHC RBC AUTO-ENTMCNC: 33.9 GM/DL (ref 32.2–35.5)
MCV RBC AUTO: 98.5 FL (ref 80–94)
MISCELLANEOUS LAB TEST RESULT: NORMAL
MONOCYTES # BLD: 5.1 %
MONOCYTES ABSOLUTE: 0.3 THOU/MM3 (ref 0.4–1.3)
NITRITE, URINE: NEGATIVE
NUCLEATED RED BLOOD CELLS: 0 /100 WBC
OPIATES, URINE: NEGATIVE
OSMOLALITY CALCULATION: 281.7 MOSMOL/KG (ref 275–300)
OXYCODONE: NEGATIVE
PH UA: 7
PHENCYCLIDINE QUANTITATIVE URINE: NEGATIVE
PLATELET # BLD: 236 THOU/MM3 (ref 130–400)
PMV BLD AUTO: 8.6 FL (ref 9.4–12.4)
POTASSIUM REFLEX MAGNESIUM: 4.3 MEQ/L (ref 3.5–5.2)
PRO-BNP: 83.7 PG/ML (ref 0–900)
PROTEIN UA: NEGATIVE MG/DL
RBC # BLD: 3.92 MILL/MM3 (ref 4.7–6.1)
RBC URINE: NORMAL /HPF
RENAL EPITHELIAL, UA: NORMAL
SEG NEUTROPHILS: 61.2 %
SEGMENTED NEUTROPHILS ABSOLUTE COUNT: 3.6 THOU/MM3 (ref 1.8–7.7)
SODIUM BLD-SCNC: 141 MEQ/L (ref 135–145)
SPECIFIC GRAVITY UA: 1.02 (ref 1–1.03)
T4 FREE: 1.17 NG/DL (ref 0.93–1.76)
TOTAL PROTEIN: 7.4 G/DL (ref 6.1–8)
TROPONIN T: < 0.01 NG/ML
TROPONIN T: < 0.01 NG/ML
TSH SERPL DL<=0.05 MIU/L-ACNC: 0.29 UIU/ML (ref 0.4–4.2)
UROBILINOGEN, URINE: 1 EU/DL
WBC # BLD: 5.9 THOU/MM3 (ref 4.8–10.8)
WBC UA: NORMAL /HPF
YEAST: NORMAL

## 2018-08-05 PROCEDURE — 83880 ASSAY OF NATRIURETIC PEPTIDE: CPT

## 2018-08-05 PROCEDURE — 6370000000 HC RX 637 (ALT 250 FOR IP): Performed by: NURSE PRACTITIONER

## 2018-08-05 PROCEDURE — G0480 DRUG TEST DEF 1-7 CLASSES: HCPCS

## 2018-08-05 PROCEDURE — 93005 ELECTROCARDIOGRAM TRACING: CPT | Performed by: EMERGENCY MEDICINE

## 2018-08-05 PROCEDURE — 81001 URINALYSIS AUTO W/SCOPE: CPT

## 2018-08-05 PROCEDURE — 99285 EMERGENCY DEPT VISIT HI MDM: CPT

## 2018-08-05 PROCEDURE — 2580000003 HC RX 258: Performed by: NURSE PRACTITIONER

## 2018-08-05 PROCEDURE — 93010 ELECTROCARDIOGRAM REPORT: CPT | Performed by: INTERNAL MEDICINE

## 2018-08-05 PROCEDURE — 80076 HEPATIC FUNCTION PANEL: CPT

## 2018-08-05 PROCEDURE — 71045 X-RAY EXAM CHEST 1 VIEW: CPT

## 2018-08-05 PROCEDURE — 80048 BASIC METABOLIC PNL TOTAL CA: CPT

## 2018-08-05 PROCEDURE — 85025 COMPLETE CBC W/AUTO DIFF WBC: CPT

## 2018-08-05 PROCEDURE — 36415 COLL VENOUS BLD VENIPUNCTURE: CPT

## 2018-08-05 PROCEDURE — 84443 ASSAY THYROID STIM HORMONE: CPT

## 2018-08-05 PROCEDURE — 80307 DRUG TEST PRSMV CHEM ANLYZR: CPT

## 2018-08-05 PROCEDURE — 84484 ASSAY OF TROPONIN QUANT: CPT

## 2018-08-05 PROCEDURE — 84439 ASSAY OF FREE THYROXINE: CPT

## 2018-08-05 RX ORDER — 0.9 % SODIUM CHLORIDE 0.9 %
1000 INTRAVENOUS SOLUTION INTRAVENOUS ONCE
Status: COMPLETED | OUTPATIENT
Start: 2018-08-05 | End: 2018-08-05

## 2018-08-05 RX ORDER — NAPROXEN 500 MG/1
500 TABLET ORAL 2 TIMES DAILY WITH MEALS
Qty: 30 TABLET | Refills: 0 | Status: ON HOLD | OUTPATIENT
Start: 2018-08-05 | End: 2021-04-13 | Stop reason: HOSPADM

## 2018-08-05 RX ORDER — ASPIRIN 81 MG/1
324 TABLET, CHEWABLE ORAL ONCE
Status: COMPLETED | OUTPATIENT
Start: 2018-08-05 | End: 2018-08-05

## 2018-08-05 RX ADMIN — ASPIRIN 81 MG CHEWABLE TABLET 324 MG: 81 TABLET CHEWABLE at 06:23

## 2018-08-05 RX ADMIN — SODIUM CHLORIDE 1000 ML: 9 INJECTION, SOLUTION INTRAVENOUS at 06:25

## 2018-08-05 RX ADMIN — NITROGLYCERIN 0.5 INCH: 20 OINTMENT TOPICAL at 06:23

## 2018-08-05 ASSESSMENT — ENCOUNTER SYMPTOMS
VOMITING: 0
BLOOD IN STOOL: 0
DIARRHEA: 1
VOICE CHANGE: 0
SORE THROAT: 1
WHEEZING: 0
NAUSEA: 1
ABDOMINAL PAIN: 0
SHORTNESS OF BREATH: 1
EYE REDNESS: 0
CHEST TIGHTNESS: 0
COUGH: 1
RHINORRHEA: 0
BACK PAIN: 0
CONSTIPATION: 0
ABDOMINAL DISTENTION: 0
COLOR CHANGE: 0
PHOTOPHOBIA: 0
SINUS PRESSURE: 0

## 2018-08-05 ASSESSMENT — PAIN DESCRIPTION - LOCATION: LOCATION: CHEST

## 2018-08-05 ASSESSMENT — HEART SCORE: ECG: 0

## 2018-08-05 ASSESSMENT — PAIN SCALES - GENERAL: PAINLEVEL_OUTOF10: 10

## 2018-08-05 ASSESSMENT — PAIN DESCRIPTION - PAIN TYPE: TYPE: ACUTE PAIN

## 2018-08-05 ASSESSMENT — PAIN DESCRIPTION - DESCRIPTORS: DESCRIPTORS: ACHING

## 2018-08-05 ASSESSMENT — PAIN DESCRIPTION - ORIENTATION: ORIENTATION: MID;UPPER

## 2018-08-05 NOTE — ED PROVIDER NOTES
[place] [address]       Chief Complaint   Patient presents with    Shortness of Breath    Dizziness    Chest Pain       Nurses Notes reviewed and I agree except as noted in the HPI. HISTORY OF PRESENT ILLNESS    Marshall Byers is a 46 y.o. male who presents to the ED for evaluation of chest pain onset yesterday. The patient states he was getting ready for work this morning, when he started feeling chest pain, lightheaded and dizzy. 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 He denies ever having this pain before. Patient states his pain is located in the middle, and he denies radiation of pain. He rates his pain as a 10/10 in severity. Patient describes his pain as achy in character. His pain is made worse with bending and movement. The patient reports associated nausea, diarrhea, sore throat, cough and shortness of breath. He denies vomiting, fever, chills, abdominal pain, runny nose or worsening numbness and tingling in his legs. The patient states he woke up last night sweating. The patient states he smokes 0.5 packs of cigarettes per day. He denies having a personal or family history of heart disease. He denies ever having a stress test or heart cath. Patient states he has high blood pressure, but denies taking medications. He denies having adolfo cholesterol. He states he smokes marijuana, and has smoked cocaine in the past, but has quit. No further complaints at the time of the initial encounter. Pain description:  Onset: yesterday  Location: middle of chest  Duration: intermittent  Character: achy  Aggravating factors: worse with bending and movement  Radiation: denies  Timing: right now  Severity: 10/10    Experienced previously: No.     HPI was provided by the patient. REVIEW OF SYSTEMS     Review of Systems   Constitutional: Negative for appetite change, chills, diaphoresis, fatigue, fever and unexpected weight change.    HENT: Positive for sore throat. Negative for congestion, hearing loss, postnasal drip, rhinorrhea, sinus pressure and voice change. Eyes: Negative for photophobia, redness and visual disturbance. Respiratory: Positive for cough and shortness of breath. Negative for chest tightness and wheezing. Cardiovascular: Positive for chest pain (mid). Negative for palpitations. Gastrointestinal: Positive for diarrhea and nausea. Negative for abdominal distention, abdominal pain, blood in stool, constipation and vomiting. Endocrine: Negative for cold intolerance, heat intolerance, polydipsia, polyphagia and polyuria. Genitourinary: Negative for decreased urine volume, difficulty urinating, dysuria, flank pain and frequency. Musculoskeletal: Negative for arthralgias, back pain, gait problem, joint swelling, neck pain and neck stiffness. Skin: Negative for color change and rash. Allergic/Immunologic: Negative for immunocompromised state. Neurological: Positive for dizziness and light-headedness. Negative for tremors, weakness, numbness and headaches. Hematological: Does not bruise/bleed easily. Psychiatric/Behavioral: Negative for behavioral problems, confusion, decreased concentration, hallucinations, self-injury and suicidal ideas. The patient is not nervous/anxious. PAST MEDICAL HISTORY    has a past medical history of Anxiety; Arthritis; Asthma; Chronic back pain; Depression; Hypertension; Microscopic hematuria; Movement disorder; and Pulmonary tuberculosis. SURGICAL HISTORY      has a past surgical history that includes Lung biopsy (2012) and back surgery (2004).     CURRENT MEDICATIONS       Previous Medications    ARIPIPRAZOLE (ABILIFY) 5 MG TABLET    Take 1 tablet by mouth daily    CITALOPRAM (CELEXA) 10 MG TABLET    Take 1 tablet by mouth daily    HYDROXYZINE (VISTARIL) 25 MG CAPSULE    Take 1 capsule by mouth 3 times daily as needed for Anxiety    TRAZODONE (DESYREL) 150 MG TABLET    Take 1 thought content normal.   Nursing note and vitals reviewed. DIFFERENTIAL DIAGNOSIS:   ACS, musculoskeletal pain, bronchitis, pneumonia, viral syndrome    DIAGNOSTIC RESULTS     EKG: All EKG's are interpreted by the Emergency Department Physician who either signs or Co-signs this chart in the absence of a cardiologist.    EKG read and interpreted by myself with comparison to 04-JAN-2016 gives impression of normal sinus rhythm with heart rate of 86; interval 170; QRS 90;QTc 421; axis 42. No STEMI     RADIOLOGY: non-plain film images(s) such as CT, Ultrasound and MRI are read by the radiologist.  Plain radiographic images are visualized and preliminarily interpreted by the emergency physician unless otherwise stated below. XR CHEST PORTABLE   Final Result   Poor inspiration. Plate atelectasis right lower lobe. **This report has been created using voice recognition software. It may contain minor errors which are inherent in voice recognition technology. **      Final report electronically signed by Dr. Fito Joy on 8/5/2018 6:58 AM            LABS:   Labs Reviewed   CBC WITH AUTO DIFFERENTIAL - Abnormal; Notable for the following:        Result Value    RBC 3.92 (*)     Hemoglobin 13.1 (*)     Hematocrit 38.6 (*)     MCV 98.5 (*)     MCH 33.4 (*)     MPV 8.6 (*)     Monocytes # 0.3 (*)     All other components within normal limits   HEPATIC FUNCTION PANEL - Abnormal; Notable for the following:      (*)     All other components within normal limits   TSH WITHOUT REFLEX - Abnormal; Notable for the following:     TSH 0.286 (*)     All other components within normal limits   BASIC METABOLIC PANEL W/ REFLEX TO MG FOR LOW K    BRAIN NATRIURETIC PEPTIDE   TROPONIN   ETHANOL   URINE DRUG SCREEN   URINALYSIS WITH MICROSCOPIC   ANION GAP   OSMOLALITY   GLOMERULAR FILTRATION RATE, ESTIMATED   TROPONIN   T4, FREE       EMERGENCY DEPARTMENT COURSE:   Vitals:    Vitals:    08/05/18 5202 08/05/18 5482 08/05/18 0835   BP: (!) 162/109 (!) 145/94 (!) 149/93   Pulse: 86 77 71   Resp: 16 16 16   Temp: 98.3 °F (36.8 °C)     TempSrc: Oral     SpO2: 98% 98% 98%   Weight: 184 lb (83.5 kg)     Height: 5' 10\" (1.778 m)         MDM  The patient presents to the ED with complaints of chest pain. The patient was in a hypertensive state, but not in any acute distress. The patient's physical exam showed reproducible chest pain. Diminished breath sounds in the bases. Hyperactive bowel sounds. Within the department, the patient was treated with IV fluids, chewable Asprin and Nitro. Patient's status improved during the duration of their stay. Labs and imaging were ordered, and reviewed with the patient. He tested positive for cannabis and cocaine. CXR showed plate atelectasis right lower lobe. EKG showed NSR. I discussed case with Dr. Em Lai hospitalist for admission for chest pain rule out. He doesn't think patient qualifies for admission, I agree pretty soft admit with negative troponin and atypical symptoms for angina. I explained my proposed course of treatment with the patient, and they were amenable to my decision. The patient will be discharged home with [facility], and will need to follow-up with [facility] 11 on August 9th at 10:00 AM. The patient will need to come back to the ER if their symptoms worsen, or become more severe in nature. Medications   0.9 % sodium chloride bolus (0 mLs Intravenous Stopped 8/5/18 0835)   aspirin chewable tablet 324 mg (324 mg Oral Given 8/5/18 0623)   nitroglycerin (NITRO-BID) 2 % ointment 0.5 inch (0.5 inches Topical Given 8/5/18 4379)       Patient was seen independently by myself. The patient's final impression and disposition and plan was determined by myself. CRITICAL CARE:   None    CONSULTS:  Dr. Bev Woodall:  None    FINAL IMPRESSION      1. Chest pain, unspecified type    2.  Cocaine abuse          DISPOSITION/PLAN   Discharged     PATIENT REFERRED TO:  [facility]

## 2018-09-04 ENCOUNTER — HOSPITAL ENCOUNTER (EMERGENCY)
Age: 53
Discharge: HOME | End: 2018-09-04

## 2018-09-04 VITALS
WEIGHT: 185 LBS | OXYGEN SATURATION: 99 % | SYSTOLIC BLOOD PRESSURE: 135 MMHG | HEIGHT: 70 IN | TEMPERATURE: 97.2 F | BODY MASS INDEX: 26.48 KG/M2 | RESPIRATION RATE: 14 BRPM | DIASTOLIC BLOOD PRESSURE: 81 MMHG | HEART RATE: 98 BPM

## 2018-09-04 DIAGNOSIS — Z20.2 EXPOSURE TO GONORRHEA: Primary | ICD-10-CM

## 2018-09-04 LAB
CHLAMYDIA TRACHOMATIS BY RT-PCR: NOT DETECTED
CT/NG SOURCE: NORMAL
NEISSERIA GONORRHOEAE BY RT-PCR: NOT DETECTED

## 2018-09-04 PROCEDURE — 6370000000 HC RX 637 (ALT 250 FOR IP): Performed by: PHYSICIAN ASSISTANT

## 2018-09-04 PROCEDURE — 99283 EMERGENCY DEPT VISIT LOW MDM: CPT

## 2018-09-04 PROCEDURE — 96372 THER/PROPH/DIAG INJ SC/IM: CPT

## 2018-09-04 PROCEDURE — 6360000002 HC RX W HCPCS: Performed by: PHYSICIAN ASSISTANT

## 2018-09-04 PROCEDURE — 2500000003 HC RX 250 WO HCPCS

## 2018-09-04 PROCEDURE — 87491 CHLMYD TRACH DNA AMP PROBE: CPT

## 2018-09-04 PROCEDURE — 87591 N.GONORRHOEAE DNA AMP PROB: CPT

## 2018-09-04 RX ORDER — AZITHROMYCIN 250 MG/1
1000 TABLET, FILM COATED ORAL ONCE
Status: COMPLETED | OUTPATIENT
Start: 2018-09-04 | End: 2018-09-04

## 2018-09-04 RX ORDER — CEFTRIAXONE SODIUM 250 MG/1
250 INJECTION, POWDER, FOR SOLUTION INTRAMUSCULAR; INTRAVENOUS ONCE
Status: COMPLETED | OUTPATIENT
Start: 2018-09-04 | End: 2018-09-04

## 2018-09-04 RX ORDER — LIDOCAINE HYDROCHLORIDE 10 MG/ML
INJECTION, SOLUTION EPIDURAL; INFILTRATION; INTRACAUDAL; PERINEURAL
Status: COMPLETED
Start: 2018-09-04 | End: 2018-09-04

## 2018-09-04 RX ADMIN — AZITHROMYCIN 1000 MG: 250 TABLET, FILM COATED ORAL at 11:16

## 2018-09-04 RX ADMIN — CEFTRIAXONE SODIUM 250 MG: 250 INJECTION, POWDER, FOR SOLUTION INTRAMUSCULAR; INTRAVENOUS at 11:16

## 2018-09-04 RX ADMIN — LIDOCAINE HYDROCHLORIDE 2 ML: 10 INJECTION, SOLUTION EPIDURAL; INFILTRATION; INTRACAUDAL; PERINEURAL at 11:16

## 2018-09-04 ASSESSMENT — ENCOUNTER SYMPTOMS
DIARRHEA: 0
BACK PAIN: 0
WHEEZING: 0
VOMITING: 0
SHORTNESS OF BREATH: 0
EYE REDNESS: 0
EYE DISCHARGE: 0
NAUSEA: 0
RHINORRHEA: 0
SORE THROAT: 0
ABDOMINAL PAIN: 0
COUGH: 0

## 2018-09-04 NOTE — ED PROVIDER NOTES
tenderness. Abdominal: Soft. Bowel sounds are normal. He exhibits no distension. There is no tenderness. Genitourinary: Right testis shows no swelling and no tenderness. Left testis shows no swelling and no tenderness. No penile erythema or penile tenderness. No discharge found. Genitourinary Comments: No lesions. Musculoskeletal: Normal range of motion. He exhibits no edema. Lymphadenopathy:        Right: No inguinal adenopathy present. Left: No inguinal adenopathy present. Neurological: He is alert and oriented to person, place, and time. No cranial nerve deficit. Skin: Skin is warm and dry. No lesion and no rash noted. He is not diaphoretic. No erythema. No pallor. Psychiatric: He has a normal mood and affect. His behavior is normal. Judgment and thought content normal.   Nursing note and vitals reviewed. DIFFERENTIAL DIAGNOSIS:   STD    DIAGNOSTIC RESULTS     EKG: All EKG's are interpreted by the Emergency Department Physician who either signs or Co-signs this chart in the absence of a cardiologist.    None    RADIOLOGY: non-plain film images(s) such as CT, Ultrasound and MRI are read by the radiologist.    None    LABS:     Labs Reviewed   CHLAMYDIA TRACHOMATIS, JASPAL   NEISSERIA GONORRHOEAE, JASPAL       EMERGENCY DEPARTMENT COURSE:   Vitals:    Vitals:    09/04/18 1046   BP: 135/81   Pulse: 98   Resp: 14   Temp: 97.2 °F (36.2 °C)   TempSrc: Oral   SpO2: 99%   Weight: 185 lb (83.9 kg)   Height: 5' 10\" (1.778 m)       11:08 AM: The patient was seen and evaluated. MDM:  The patient presents to the ED with complaints of exposure to STD. The patient was not in any acute distress. The patient's physical exam showed no tenderness, swelling, discharge, sores, lesions, erythema or adenopathy. Within the department, the patient was treated with Rocephin and Zithromax. Patient's status improved during the duration of their stay. Labs were ordered, and reviewed with the patient.  I explained my

## 2020-11-03 PROBLEM — R07.9 CHEST PAIN: Status: RESOLVED | Noted: 2020-11-03 | Resolved: 2020-11-03

## 2021-04-04 ENCOUNTER — APPOINTMENT (OUTPATIENT)
Dept: GENERAL RADIOLOGY | Age: 56
DRG: 751 | End: 2021-04-04
Payer: MEDICAID

## 2021-04-04 ENCOUNTER — HOSPITAL ENCOUNTER (OUTPATIENT)
Age: 56
Setting detail: OBSERVATION
Discharge: PSYCHIATRIC OR SUBSTANCE USE FACILITY | DRG: 751 | End: 2021-04-06
Attending: FAMILY MEDICINE | Admitting: INTERNAL MEDICINE
Payer: MEDICAID

## 2021-04-04 DIAGNOSIS — F10.920 ACUTE ALCOHOLIC INTOXICATION WITHOUT COMPLICATION (HCC): ICD-10-CM

## 2021-04-04 DIAGNOSIS — F14.10 COCAINE ABUSE (HCC): ICD-10-CM

## 2021-04-04 DIAGNOSIS — N17.9 ACUTE RENAL FAILURE, UNSPECIFIED ACUTE RENAL FAILURE TYPE (HCC): ICD-10-CM

## 2021-04-04 DIAGNOSIS — F14.90 COCAINE USE: ICD-10-CM

## 2021-04-04 DIAGNOSIS — R07.9 CHEST PAIN, UNSPECIFIED TYPE: Primary | ICD-10-CM

## 2021-04-04 LAB
ALBUMIN SERPL-MCNC: 4.6 G/DL (ref 3.5–5.1)
ALP BLD-CCNC: 40 U/L (ref 38–126)
ALT SERPL-CCNC: 32 U/L (ref 11–66)
AMPHETAMINE+METHAMPHETAMINE URINE SCREEN: NEGATIVE
ANION GAP SERPL CALCULATED.3IONS-SCNC: 17 MEQ/L (ref 8–16)
APTT: 28.3 SECONDS (ref 22–38)
AST SERPL-CCNC: 57 U/L (ref 5–40)
BACTERIA: ABNORMAL
BARBITURATE QUANTITATIVE URINE: NEGATIVE
BASOPHILS # BLD: 0.3 %
BASOPHILS ABSOLUTE: 0 THOU/MM3 (ref 0–0.1)
BENZODIAZEPINE QUANTITATIVE URINE: NEGATIVE
BILIRUB SERPL-MCNC: 1 MG/DL (ref 0.3–1.2)
BILIRUBIN URINE: NEGATIVE
BLOOD, URINE: ABNORMAL
BUN BLDV-MCNC: 22 MG/DL (ref 7–22)
CALCIUM SERPL-MCNC: 10.3 MG/DL (ref 8.5–10.5)
CANNABINOID QUANTITATIVE URINE: NEGATIVE
CASTS: ABNORMAL /LPF
CASTS: ABNORMAL /LPF
CHARACTER, URINE: ABNORMAL
CHLORIDE BLD-SCNC: 96 MEQ/L (ref 98–111)
CO2: 23 MEQ/L (ref 23–33)
COCAINE METABOLITE QUANTITATIVE URINE: POSITIVE
COLOR: YELLOW
CREAT SERPL-MCNC: 2.2 MG/DL (ref 0.4–1.2)
CRYSTALS: ABNORMAL
D-DIMER QUANTITATIVE: 512 NG/ML FEU (ref 0–500)
EKG ATRIAL RATE: 76 BPM
EKG ATRIAL RATE: 95 BPM
EKG P AXIS: 48 DEGREES
EKG P AXIS: 56 DEGREES
EKG P-R INTERVAL: 152 MS
EKG P-R INTERVAL: 178 MS
EKG Q-T INTERVAL: 342 MS
EKG Q-T INTERVAL: 364 MS
EKG QRS DURATION: 64 MS
EKG QRS DURATION: 84 MS
EKG QTC CALCULATION (BAZETT): 384 MS
EKG QTC CALCULATION (BAZETT): 457 MS
EKG R AXIS: 15 DEGREES
EKG R AXIS: 33 DEGREES
EKG T AXIS: 36 DEGREES
EKG T AXIS: 56 DEGREES
EKG VENTRICULAR RATE: 76 BPM
EKG VENTRICULAR RATE: 95 BPM
EOSINOPHIL # BLD: 0.7 %
EOSINOPHILS ABSOLUTE: 0 THOU/MM3 (ref 0–0.4)
EPITHELIAL CELLS, UA: ABNORMAL /HPF
ERYTHROCYTE [DISTWIDTH] IN BLOOD BY AUTOMATED COUNT: 12 % (ref 11.5–14.5)
ERYTHROCYTE [DISTWIDTH] IN BLOOD BY AUTOMATED COUNT: 43.7 FL (ref 35–45)
ETHYL ALCOHOL, SERUM: < 0.01 %
GFR SERPL CREATININE-BSD FRML MDRD: 37 ML/MIN/1.73M2
GLUCOSE BLD-MCNC: 90 MG/DL (ref 70–108)
GLUCOSE, URINE: NEGATIVE MG/DL
HCT VFR BLD CALC: 45.4 % (ref 42–52)
HEMOGLOBIN: 14.9 GM/DL (ref 14–18)
IMMATURE GRANS (ABS): 0.02 THOU/MM3 (ref 0–0.07)
IMMATURE GRANULOCYTES: 0.3 %
INR BLD: 1.03 (ref 0.85–1.13)
KETONES, URINE: 40
LEUKOCYTE EST, POC: NEGATIVE
LYMPHOCYTES # BLD: 31.3 %
LYMPHOCYTES ABSOLUTE: 2.1 THOU/MM3 (ref 1–4.8)
MCH RBC QN AUTO: 32.3 PG (ref 26–33)
MCHC RBC AUTO-ENTMCNC: 32.8 GM/DL (ref 32.2–35.5)
MCV RBC AUTO: 98.3 FL (ref 80–94)
MISCELLANEOUS LAB TEST RESULT: ABNORMAL
MONOCYTES # BLD: 10.1 %
MONOCYTES ABSOLUTE: 0.7 THOU/MM3 (ref 0.4–1.3)
MUCUS: ABNORMAL
NITRITE, URINE: NEGATIVE
NUCLEATED RED BLOOD CELLS: 0 /100 WBC
OPIATES, URINE: NEGATIVE
OSMOLALITY CALCULATION: 274.8 MOSMOL/KG (ref 275–300)
OXYCODONE: NEGATIVE
PH UA: 5.5 (ref 5–9)
PHENCYCLIDINE QUANTITATIVE URINE: NEGATIVE
PLATELET # BLD: 230 THOU/MM3 (ref 130–400)
PMV BLD AUTO: 9 FL (ref 9.4–12.4)
POTASSIUM REFLEX MAGNESIUM: 3.8 MEQ/L (ref 3.5–5.2)
PRO-BNP: 161.8 PG/ML (ref 0–900)
PROTEIN UA: 30 MG/DL
RBC # BLD: 4.62 MILL/MM3 (ref 4.7–6.1)
RBC URINE: ABNORMAL /HPF
RENAL EPITHELIAL, UA: ABNORMAL
SEG NEUTROPHILS: 57.3 %
SEGMENTED NEUTROPHILS ABSOLUTE COUNT: 3.8 THOU/MM3 (ref 1.8–7.7)
SODIUM BLD-SCNC: 136 MEQ/L (ref 135–145)
SPECIFIC GRAVITY UA: 1.02 (ref 1–1.03)
TOTAL PROTEIN: 8.3 G/DL (ref 6.1–8)
TROPONIN T: < 0.01 NG/ML
TROPONIN T: < 0.01 NG/ML
TSH SERPL DL<=0.05 MIU/L-ACNC: 1.12 UIU/ML (ref 0.4–4.2)
UROBILINOGEN, URINE: 0.2 EU/DL (ref 0–1)
WBC # BLD: 6.7 THOU/MM3 (ref 4.8–10.8)
WBC UA: ABNORMAL /HPF
YEAST: ABNORMAL

## 2021-04-04 PROCEDURE — 99285 EMERGENCY DEPT VISIT HI MDM: CPT

## 2021-04-04 PROCEDURE — 93010 ELECTROCARDIOGRAM REPORT: CPT | Performed by: INTERNAL MEDICINE

## 2021-04-04 PROCEDURE — 84484 ASSAY OF TROPONIN QUANT: CPT

## 2021-04-04 PROCEDURE — 71045 X-RAY EXAM CHEST 1 VIEW: CPT

## 2021-04-04 PROCEDURE — 85730 THROMBOPLASTIN TIME PARTIAL: CPT

## 2021-04-04 PROCEDURE — 96375 TX/PRO/DX INJ NEW DRUG ADDON: CPT

## 2021-04-04 PROCEDURE — 99220 PR INITIAL OBSERVATION CARE/DAY 70 MINUTES: CPT | Performed by: INTERNAL MEDICINE

## 2021-04-04 PROCEDURE — 36415 COLL VENOUS BLD VENIPUNCTURE: CPT

## 2021-04-04 PROCEDURE — 85379 FIBRIN DEGRADATION QUANT: CPT

## 2021-04-04 PROCEDURE — 85025 COMPLETE CBC W/AUTO DIFF WBC: CPT

## 2021-04-04 PROCEDURE — 84443 ASSAY THYROID STIM HORMONE: CPT

## 2021-04-04 PROCEDURE — 93005 ELECTROCARDIOGRAM TRACING: CPT | Performed by: FAMILY MEDICINE

## 2021-04-04 PROCEDURE — 6360000002 HC RX W HCPCS: Performed by: FAMILY MEDICINE

## 2021-04-04 PROCEDURE — 85610 PROTHROMBIN TIME: CPT

## 2021-04-04 PROCEDURE — 80053 COMPREHEN METABOLIC PANEL: CPT

## 2021-04-04 PROCEDURE — 80307 DRUG TEST PRSMV CHEM ANLYZR: CPT

## 2021-04-04 PROCEDURE — 96366 THER/PROPH/DIAG IV INF ADDON: CPT

## 2021-04-04 PROCEDURE — 2580000003 HC RX 258: Performed by: INTERNAL MEDICINE

## 2021-04-04 PROCEDURE — 83036 HEMOGLOBIN GLYCOSYLATED A1C: CPT

## 2021-04-04 PROCEDURE — 6370000000 HC RX 637 (ALT 250 FOR IP): Performed by: INTERNAL MEDICINE

## 2021-04-04 PROCEDURE — 81001 URINALYSIS AUTO W/SCOPE: CPT

## 2021-04-04 PROCEDURE — 96365 THER/PROPH/DIAG IV INF INIT: CPT

## 2021-04-04 PROCEDURE — 2580000003 HC RX 258: Performed by: FAMILY MEDICINE

## 2021-04-04 PROCEDURE — G0378 HOSPITAL OBSERVATION PER HR: HCPCS

## 2021-04-04 PROCEDURE — 83880 ASSAY OF NATRIURETIC PEPTIDE: CPT

## 2021-04-04 PROCEDURE — 82077 ASSAY SPEC XCP UR&BREATH IA: CPT

## 2021-04-04 RX ORDER — AMLODIPINE BESYLATE 10 MG/1
10 TABLET ORAL DAILY
Status: DISCONTINUED | OUTPATIENT
Start: 2021-04-04 | End: 2021-04-06 | Stop reason: HOSPADM

## 2021-04-04 RX ORDER — SODIUM CHLORIDE 0.9 % (FLUSH) 0.9 %
10 SYRINGE (ML) INJECTION EVERY 12 HOURS SCHEDULED
Status: DISCONTINUED | OUTPATIENT
Start: 2021-04-04 | End: 2021-04-06 | Stop reason: HOSPADM

## 2021-04-04 RX ORDER — ACETAMINOPHEN 325 MG/1
650 TABLET ORAL EVERY 6 HOURS PRN
Status: DISCONTINUED | OUTPATIENT
Start: 2021-04-04 | End: 2021-04-06 | Stop reason: HOSPADM

## 2021-04-04 RX ORDER — ACETAMINOPHEN 650 MG/1
650 SUPPOSITORY RECTAL EVERY 6 HOURS PRN
Status: DISCONTINUED | OUTPATIENT
Start: 2021-04-04 | End: 2021-04-06 | Stop reason: HOSPADM

## 2021-04-04 RX ORDER — HEPARIN SODIUM 1000 [USP'U]/ML
4000 INJECTION, SOLUTION INTRAVENOUS; SUBCUTANEOUS ONCE
Status: COMPLETED | OUTPATIENT
Start: 2021-04-04 | End: 2021-04-04

## 2021-04-04 RX ORDER — SODIUM CHLORIDE 0.9 % (FLUSH) 0.9 %
10 SYRINGE (ML) INJECTION PRN
Status: DISCONTINUED | OUTPATIENT
Start: 2021-04-04 | End: 2021-04-06 | Stop reason: HOSPADM

## 2021-04-04 RX ORDER — ASPIRIN 81 MG/1
81 TABLET, CHEWABLE ORAL DAILY
Status: DISCONTINUED | OUTPATIENT
Start: 2021-04-05 | End: 2021-04-06 | Stop reason: HOSPADM

## 2021-04-04 RX ORDER — SODIUM CHLORIDE 9 MG/ML
25 INJECTION, SOLUTION INTRAVENOUS PRN
Status: DISCONTINUED | OUTPATIENT
Start: 2021-04-04 | End: 2021-04-06 | Stop reason: HOSPADM

## 2021-04-04 RX ORDER — ARIPIPRAZOLE 5 MG/1
5 TABLET ORAL DAILY
Status: DISCONTINUED | OUTPATIENT
Start: 2021-04-05 | End: 2021-04-04

## 2021-04-04 RX ORDER — CITALOPRAM 20 MG/1
10 TABLET ORAL DAILY
Status: DISCONTINUED | OUTPATIENT
Start: 2021-04-05 | End: 2021-04-04

## 2021-04-04 RX ORDER — 0.9 % SODIUM CHLORIDE 0.9 %
1000 INTRAVENOUS SOLUTION INTRAVENOUS ONCE
Status: COMPLETED | OUTPATIENT
Start: 2021-04-04 | End: 2021-04-04

## 2021-04-04 RX ORDER — LORAZEPAM 2 MG/ML
1 INJECTION INTRAMUSCULAR ONCE
Status: COMPLETED | OUTPATIENT
Start: 2021-04-04 | End: 2021-04-04

## 2021-04-04 RX ORDER — PROMETHAZINE HYDROCHLORIDE 25 MG/1
12.5 TABLET ORAL EVERY 6 HOURS PRN
Status: DISCONTINUED | OUTPATIENT
Start: 2021-04-04 | End: 2021-04-06 | Stop reason: HOSPADM

## 2021-04-04 RX ORDER — HEPARIN SODIUM 1000 [USP'U]/ML
4000 INJECTION, SOLUTION INTRAVENOUS; SUBCUTANEOUS PRN
Status: DISCONTINUED | OUTPATIENT
Start: 2021-04-04 | End: 2021-04-05

## 2021-04-04 RX ORDER — PANTOPRAZOLE SODIUM 40 MG/1
40 TABLET, DELAYED RELEASE ORAL
Status: DISCONTINUED | OUTPATIENT
Start: 2021-04-04 | End: 2021-04-06 | Stop reason: HOSPADM

## 2021-04-04 RX ORDER — HEPARIN SODIUM 1000 [USP'U]/ML
2000 INJECTION, SOLUTION INTRAVENOUS; SUBCUTANEOUS PRN
Status: DISCONTINUED | OUTPATIENT
Start: 2021-04-04 | End: 2021-04-05

## 2021-04-04 RX ORDER — POLYETHYLENE GLYCOL 3350 17 G/17G
17 POWDER, FOR SOLUTION ORAL DAILY PRN
Status: DISCONTINUED | OUTPATIENT
Start: 2021-04-04 | End: 2021-04-06 | Stop reason: HOSPADM

## 2021-04-04 RX ORDER — ONDANSETRON 2 MG/ML
4 INJECTION INTRAMUSCULAR; INTRAVENOUS EVERY 6 HOURS PRN
Status: DISCONTINUED | OUTPATIENT
Start: 2021-04-04 | End: 2021-04-06 | Stop reason: HOSPADM

## 2021-04-04 RX ORDER — HEPARIN SODIUM 10000 [USP'U]/100ML
5-30 INJECTION, SOLUTION INTRAVENOUS CONTINUOUS
Status: DISCONTINUED | OUTPATIENT
Start: 2021-04-04 | End: 2021-04-04

## 2021-04-04 RX ORDER — NICOTINE 21 MG/24HR
1 PATCH, TRANSDERMAL 24 HOURS TRANSDERMAL DAILY
Status: DISCONTINUED | OUTPATIENT
Start: 2021-04-05 | End: 2021-04-06 | Stop reason: HOSPADM

## 2021-04-04 RX ORDER — SODIUM CHLORIDE 9 MG/ML
INJECTION, SOLUTION INTRAVENOUS CONTINUOUS
Status: DISCONTINUED | OUTPATIENT
Start: 2021-04-04 | End: 2021-04-06 | Stop reason: HOSPADM

## 2021-04-04 RX ADMIN — PANTOPRAZOLE SODIUM 40 MG: 40 TABLET, DELAYED RELEASE ORAL at 23:50

## 2021-04-04 RX ADMIN — SODIUM CHLORIDE: 9 INJECTION, SOLUTION INTRAVENOUS at 23:27

## 2021-04-04 RX ADMIN — HEPARIN SODIUM 10 UNITS/KG/HR: 10000 INJECTION, SOLUTION INTRAVENOUS at 21:44

## 2021-04-04 RX ADMIN — SODIUM CHLORIDE 1000 ML: 9 INJECTION, SOLUTION INTRAVENOUS at 16:39

## 2021-04-04 RX ADMIN — SODIUM CHLORIDE 1000 ML: 9 INJECTION, SOLUTION INTRAVENOUS at 18:03

## 2021-04-04 RX ADMIN — HEPARIN SODIUM 4000 UNITS: 1000 INJECTION, SOLUTION INTRAVENOUS; SUBCUTANEOUS at 21:44

## 2021-04-04 RX ADMIN — LORAZEPAM 1 MG: 2 INJECTION INTRAMUSCULAR; INTRAVENOUS at 16:39

## 2021-04-04 RX ADMIN — LIDOCAINE HYDROCHLORIDE: 20 SOLUTION ORAL; TOPICAL at 23:50

## 2021-04-04 RX ADMIN — AMLODIPINE BESYLATE 10 MG: 10 TABLET ORAL at 23:54

## 2021-04-04 ASSESSMENT — ENCOUNTER SYMPTOMS
ANAL BLEEDING: 0
SHORTNESS OF BREATH: 0
VOMITING: 0
COUGH: 0
STRIDOR: 0
EYE ITCHING: 0
ABDOMINAL PAIN: 1
CONSTIPATION: 0
WHEEZING: 0
FACIAL SWELLING: 0
CHOKING: 0
EYE REDNESS: 0
EYE DISCHARGE: 0
RECTAL PAIN: 0
BLOOD IN STOOL: 0
NAUSEA: 0
BACK PAIN: 0
SINUS PRESSURE: 0
SORE THROAT: 0
APNEA: 0
VOICE CHANGE: 0
PHOTOPHOBIA: 0
ABDOMINAL DISTENTION: 0
EYE PAIN: 0
RHINORRHEA: 0
COLOR CHANGE: 0
TROUBLE SWALLOWING: 0
SINUS PAIN: 0
DIARRHEA: 0
CHEST TIGHTNESS: 0

## 2021-04-04 ASSESSMENT — PAIN DESCRIPTION - ORIENTATION: ORIENTATION: MID;UPPER

## 2021-04-04 ASSESSMENT — PAIN DESCRIPTION - PAIN TYPE: TYPE: ACUTE PAIN

## 2021-04-04 ASSESSMENT — PAIN DESCRIPTION - LOCATION
LOCATION: CHEST
LOCATION: CHEST

## 2021-04-04 ASSESSMENT — HEART SCORE: ECG: 1

## 2021-04-04 ASSESSMENT — PAIN SCALES - GENERAL: PAINLEVEL_OUTOF10: 10

## 2021-04-04 NOTE — ED NOTES
Bed: 022A  Expected date: 4/4/21  Expected time: 3:07 PM  Means of arrival: [facility]  Comments:     Nadine Kang RN  04/04/21 7579

## 2021-04-04 NOTE — ED NOTES
ED nurse-to-nurse bedside report    Chief Complaint   Patient presents with    Alcohol Intoxication    Drug Problem     \"rock cocaine\"    Nausea      LOC: alert and orientated to name, place, date  Vital signs   Vitals:    04/04/21 1518 04/04/21 1641   BP: 132/84 (!) 148/97   Pulse: 92 94   Resp: 20 18   Temp: 98.6 °F (37 °C)    TempSrc: Oral    SpO2: 100% 99%   Weight: 228 lb (103.4 kg)    Height: 5' 10\" (1.778 m)       Pain:    Pain Interventions: Ativan  Pain Goal: 5  Oxygen: No    Current needs required none   Telemetry: Yes  LDAs:   Peripheral IV 04/04/21 Left Hand (Active)   Site Assessment Intact;Dry;Clean 04/04/21 1642   Line Status Infusing 04/04/21 1642   Dressing Status Intact;Dry;Clean 04/04/21 1642   Dressing Intervention New 04/04/21 1521     Continuous Infusions:   Mobility: Requires assistance * 1  Weathers Fall Risk Score: No flowsheet data found. Fall Interventions: bed in lowest position, side rails up x2, call light in reach.   Report given to: [id number] ALFREDO RN  04/04/21 5942

## 2021-04-04 NOTE — ED TRIAGE NOTES
Pt to ED w/rprts of nausea/vomiting after rock cocaine usage and alcohol intake for the past 4 days. Slept outside last night. Pt rprts increased nausea and SOB and intermittent substernal pain. Hyperventilating at this time. Alert and oriented x4. Placed on cardiac monitor. EKG in process.

## 2021-04-04 NOTE — ED NOTES
Pt in room sleeping in bed at this time. Fluids hung per provider order. Vitals reassessed, respirs normal. Pt denies further needs, call light in reach.       [employer]  04/04/21 0564

## 2021-04-04 NOTE — ED NOTES
Report received from [facility]. Board in pt room updated, pt appears to be sleeping. No further needs, call light in reach.       Yandy Josue  04/04/21 4131

## 2021-04-04 NOTE — ED PROVIDER NOTES
EMERGENCY DEPARTMENT ENCOUNTER     CHIEF COMPLAINT   Chief Complaint   Patient presents with    Alcohol Intoxication    Drug Problem     \"rock cocaine\"    Nausea        HPI   Ellyn Hodgkins is a 61 y.o. male from the community, who presents with chest pain, onset was this morning, after doing cocaine and alcohol on a binge for the past four days. He has a hx of chronic lumbago, denies any diabetes or prior heart issue. He endorses feeling depressed in recent weeks, and did this to numb the pain. The duration has been constant. NO headache, or sob endorsed. REVIEW OF SYSTEMS   Cardiac: +Chest Pain, Denies syncope   Respiratory: Denies cough or hemoptysis   GI: Denies Vomiting or Diarrhea   General: Denies Fever   Psych: +drug and alcohol use  All other review of systems are reviewed and are otherwise negative. PAST MEDICAL & SURGICAL HISTORY   Past Medical History:   Diagnosis Date    Anxiety     Arthritis     Asthma     Chronic back pain 2004    L5-S1 dislocation w/subsequent surgery    Depression     Hypertension     Microscopic hematuria     Movement disorder     Pulmonary tuberculosis     exposure from a cousin at the age of 6-9; treated.       Past Surgical History:   Procedure Laterality Date    BACK SURGERY  2004    low back    LUNG BIOPSY  2012        CURRENT MEDICATIONS   Current Outpatient Rx   Medication Sig Dispense Refill    naproxen (NAPROSYN) 500 MG tablet Take 1 tablet by mouth 2 times daily (with meals) for 30 doses 30 tablet 0    citalopram (CELEXA) 10 MG tablet Take 1 tablet by mouth daily 30 tablet 1    traZODone (DESYREL) 150 MG tablet Take 1 tablet by mouth nightly as needed for Sleep 30 tablet 0    ARIPiprazole (ABILIFY) 5 MG tablet Take 1 tablet by mouth daily 30 tablet 1        ALLERGIES   No Known Allergies     SOCIAL & FAMILY HISTORY   Social History     Socioeconomic History    Marital status: Legally      Spouse name: Not on file    Number of children: 0    Years of education: 15    Highest education level: Not on file   Occupational History    Occupation: Unemployed     Comment: disability case pending   Social Needs    Financial resource strain: Not on file    Food insecurity     Worry: Not on file     Inability: Not on file   [employer] needs     Medical: Not on file     Non-medical: Not on file   Tobacco Use    Smoking status: Current Every Day Smoker     Packs/day: 0.50     Years: 25.00     Pack years: 12.50     Types: Cigarettes    Smokeless tobacco: Never Used   Substance and Sexual Activity    Alcohol use:  Yes     Alcohol/week: 6.0 standard drinks     Types: 6 Cans of beer per week     Comment: Weekends - 6 pack 1 day per week    Drug use: Yes     Types: Marijuana     Comment: cocaine use in the past    Sexual activity: Yes     Partners: Female   Lifestyle    Physical activity     Days per week: Not on file     Minutes per session: Not on file    Stress: Not on file   Relationships    Social connections     Talks on phone: Not on file     Gets together: Not on file     Attends Confucianism service: Not on file     Active member of club or organization: Not on file     Attends meetings of clubs or organizations: Not on file     Relationship status: Not on file    Intimate partner violence     Fear of current or ex partner: Not on file     Emotionally abused: Not on file     Physically abused: Not on file     Forced sexual activity: Not on file   Other Topics Concern    Not on file   Social History Narrative    Not on file      Family History   Problem Relation Age of Onset    Diabetes Mother     Diabetes Father     Cancer Maternal Grandmother         cervical        PHYSICAL EXAM   VITAL SIGNS: BP (!) 174/98   Pulse 91   Temp 98.6 °F (37 °C) (Oral)   Resp 18   Ht 5' 10\" (1.778 m)   Wt 228 lb (103.4 kg)   SpO2 99%   BMI 32.71 kg/m²    Constitutional: Well developed, well nourished, no acute distress   HENT: Atraumatic, moist Abnormal; Notable for the following components:    Osmolality Calc 274.8 (*)     All other components within normal limits   ANION GAP - Abnormal; Notable for the following components:    Anion Gap 17.0 (*)     All other components within normal limits   MICROSCOPIC URINALYSIS - Abnormal; Notable for the following components:    Ketones, Urine 40 (*)     Blood, Urine MODERATE (*)     Protein, UA 30 (*)     All other components within normal limits   ETHANOL   URINE DRUG SCREEN   BRAIN NATRIURETIC PEPTIDE   PROTIME-INR   APTT   TROPONIN   TROPONIN        ED COURSE & MEDICAL DECISION MAKING   Pertinent Labs & Imaging studies reviewed and interpreted. (See chart for details)   See chart for details of medications given during the ED stay. Vitals:    04/04/21 2052   BP: (!) 174/98   Pulse: 91   Resp: 18   Temp:    SpO2: 99%        Consultation: HM and psych pending    The transition of care will be at 9:31 PM.     Differential Diagnosis: cocaine-induced chest pain, Acute Coronary Syndrome, Congestive Heart Failure, Myocardial Infarction, Pulmonary Embolus, Thoracic Dissection, Pneumonia, Pneumothorax, other. FINAL IMPRESSION   1. Chest pain, unspecified type    2. Cocaine use    3. Acute alcoholic intoxication without complication ([facility]    4. Cocaine abuse ([facility]    5. Acute renal failure, unspecified acute renal failure type [facility])         Plan: Admission to the hospital     Pt does have a hx of drug binge use (alcohol beer and snorting cocaine) due to worsening depression and personal issues. Pt denies hearing voices, but currently chest pain is his main concern, anterior somewhat reproducible. His EKG did not show any active signs of ischemia. Pt does have some persistent chest pain, despite negative trop x2, I do think his binging on cocaine could have significant implications for developing CAD.  Will discuss with HM regarding admission with heparin gtt or lovenox, though he also has acute renal failure based on the initial laboratory investigation. Due to new onset TWI and persistent pain with cocaine abuse, I decided to start pt on heparin gtt, admit to Dr. Vlad Jarrell of .     Electronically signed by: Clarita Michaud MD, 4/4/2021 9:31 PM   (This note was completed with a voice recognition program)        Natan Warren MD  04/04/21 2031

## 2021-04-05 PROBLEM — N17.0 ACUTE KIDNEY INJURY (AKI) WITH ACUTE TUBULAR NECROSIS (ATN) (HCC): Status: ACTIVE | Noted: 2021-04-05

## 2021-04-05 PROBLEM — E66.09 CLASS 1 OBESITY DUE TO EXCESS CALORIES WITHOUT SERIOUS COMORBIDITY WITH BODY MASS INDEX (BMI) OF 32.0 TO 32.9 IN ADULT: Status: ACTIVE | Noted: 2021-04-05

## 2021-04-05 LAB
ANION GAP SERPL CALCULATED.3IONS-SCNC: 10 MEQ/L (ref 8–16)
AVERAGE GLUCOSE: 78 MG/DL (ref 70–126)
BUN BLDV-MCNC: 18 MG/DL (ref 7–22)
CALCIUM SERPL-MCNC: 8.9 MG/DL (ref 8.5–10.5)
CHLORIDE BLD-SCNC: 104 MEQ/L (ref 98–111)
CHOLESTEROL, TOTAL: 161 MG/DL (ref 100–199)
CO2: 23 MEQ/L (ref 23–33)
CREAT SERPL-MCNC: 1.4 MG/DL (ref 0.4–1.2)
ERYTHROCYTE [DISTWIDTH] IN BLOOD BY AUTOMATED COUNT: 12.2 % (ref 11.5–14.5)
ERYTHROCYTE [DISTWIDTH] IN BLOOD BY AUTOMATED COUNT: 44.7 FL (ref 35–45)
FERRITIN: 363 NG/ML (ref 22–322)
FOLATE: 18 NG/ML (ref 4.8–24.2)
GFR SERPL CREATININE-BSD FRML MDRD: 63 ML/MIN/1.73M2
GLUCOSE BLD-MCNC: 95 MG/DL (ref 70–108)
HBA1C MFR BLD: 4.6 % (ref 4.4–6.4)
HCT VFR BLD CALC: 38.3 % (ref 42–52)
HCT VFR BLD CALC: 39.4 % (ref 42–52)
HDLC SERPL-MCNC: 48 MG/DL
HEMOGLOBIN: 12.4 GM/DL (ref 14–18)
HEMOGLOBIN: 12.8 GM/DL (ref 14–18)
IRON SATURATION: 48 % (ref 20–50)
IRON: 107 UG/DL (ref 65–195)
LACTIC ACID: 1 MMOL/L (ref 0.5–2.2)
LDL CHOLESTEROL CALCULATED: 82 MG/DL
LIPASE: 23.8 U/L (ref 5.6–51.3)
MCH RBC QN AUTO: 32.4 PG (ref 26–33)
MCHC RBC AUTO-ENTMCNC: 32.5 GM/DL (ref 32.2–35.5)
MCV RBC AUTO: 99.7 FL (ref 80–94)
PLATELET # BLD: 216 THOU/MM3 (ref 130–400)
PMV BLD AUTO: 9 FL (ref 9.4–12.4)
POTASSIUM SERPL-SCNC: 3.5 MEQ/L (ref 3.5–5.2)
RBC # BLD: 3.95 MILL/MM3 (ref 4.7–6.1)
SODIUM BLD-SCNC: 137 MEQ/L (ref 135–145)
TOTAL CK: 1243 U/L (ref 55–170)
TOTAL IRON BINDING CAPACITY: 225 UG/DL (ref 171–450)
TRIGL SERPL-MCNC: 156 MG/DL (ref 0–199)
TROPONIN T: < 0.01 NG/ML
VITAMIN B-12: 298 PG/ML (ref 211–911)
WBC # BLD: 6.7 THOU/MM3 (ref 4.8–10.8)

## 2021-04-05 PROCEDURE — 83550 IRON BINDING TEST: CPT

## 2021-04-05 PROCEDURE — 94640 AIRWAY INHALATION TREATMENT: CPT

## 2021-04-05 PROCEDURE — 82607 VITAMIN B-12: CPT

## 2021-04-05 PROCEDURE — 6370000000 HC RX 637 (ALT 250 FOR IP): Performed by: INTERNAL MEDICINE

## 2021-04-05 PROCEDURE — 80061 LIPID PANEL: CPT

## 2021-04-05 PROCEDURE — 85014 HEMATOCRIT: CPT

## 2021-04-05 PROCEDURE — 85018 HEMOGLOBIN: CPT

## 2021-04-05 PROCEDURE — G0378 HOSPITAL OBSERVATION PER HR: HCPCS

## 2021-04-05 PROCEDURE — 2580000003 HC RX 258: Performed by: INTERNAL MEDICINE

## 2021-04-05 PROCEDURE — 36415 COLL VENOUS BLD VENIPUNCTURE: CPT

## 2021-04-05 PROCEDURE — 83605 ASSAY OF LACTIC ACID: CPT

## 2021-04-05 PROCEDURE — 6360000002 HC RX W HCPCS: Performed by: INTERNAL MEDICINE

## 2021-04-05 PROCEDURE — 84484 ASSAY OF TROPONIN QUANT: CPT

## 2021-04-05 PROCEDURE — 83690 ASSAY OF LIPASE: CPT

## 2021-04-05 PROCEDURE — 80048 BASIC METABOLIC PNL TOTAL CA: CPT

## 2021-04-05 PROCEDURE — 82728 ASSAY OF FERRITIN: CPT

## 2021-04-05 PROCEDURE — 90792 PSYCH DIAG EVAL W/MED SRVCS: CPT | Performed by: PSYCHIATRY & NEUROLOGY

## 2021-04-05 PROCEDURE — 82746 ASSAY OF FOLIC ACID SERUM: CPT

## 2021-04-05 PROCEDURE — 6360000002 HC RX W HCPCS: Performed by: PHYSICIAN ASSISTANT

## 2021-04-05 PROCEDURE — 94760 N-INVAS EAR/PLS OXIMETRY 1: CPT

## 2021-04-05 PROCEDURE — 82550 ASSAY OF CK (CPK): CPT

## 2021-04-05 PROCEDURE — 99225 PR SBSQ OBSERVATION CARE/DAY 25 MINUTES: CPT | Performed by: PHYSICIAN ASSISTANT

## 2021-04-05 PROCEDURE — 96372 THER/PROPH/DIAG INJ SC/IM: CPT

## 2021-04-05 PROCEDURE — 83540 ASSAY OF IRON: CPT

## 2021-04-05 PROCEDURE — 85027 COMPLETE CBC AUTOMATED: CPT

## 2021-04-05 RX ORDER — ALBUTEROL SULFATE 2.5 MG/3ML
2.5 SOLUTION RESPIRATORY (INHALATION) 4 TIMES DAILY
Status: DISCONTINUED | OUTPATIENT
Start: 2021-04-05 | End: 2021-04-06 | Stop reason: HOSPADM

## 2021-04-05 RX ORDER — ALBUTEROL SULFATE 2.5 MG/3ML
2.5 SOLUTION RESPIRATORY (INHALATION) EVERY 6 HOURS PRN
Status: DISCONTINUED | OUTPATIENT
Start: 2021-04-05 | End: 2021-04-05

## 2021-04-05 RX ADMIN — ALBUTEROL SULFATE 2.5 MG: 2.5 SOLUTION RESPIRATORY (INHALATION) at 20:11

## 2021-04-05 RX ADMIN — ALBUTEROL SULFATE 2.5 MG: 2.5 SOLUTION RESPIRATORY (INHALATION) at 10:49

## 2021-04-05 RX ADMIN — AMLODIPINE BESYLATE 10 MG: 10 TABLET ORAL at 11:37

## 2021-04-05 RX ADMIN — ALBUTEROL SULFATE 2.5 MG: 2.5 SOLUTION RESPIRATORY (INHALATION) at 15:09

## 2021-04-05 RX ADMIN — ASPIRIN 81 MG: 81 TABLET, CHEWABLE ORAL at 09:23

## 2021-04-05 RX ADMIN — SODIUM CHLORIDE: 9 INJECTION, SOLUTION INTRAVENOUS at 19:15

## 2021-04-05 RX ADMIN — SODIUM CHLORIDE: 9 INJECTION, SOLUTION INTRAVENOUS at 09:32

## 2021-04-05 RX ADMIN — ENOXAPARIN SODIUM 40 MG: 40 INJECTION SUBCUTANEOUS at 09:24

## 2021-04-05 ASSESSMENT — PAIN DESCRIPTION - PROGRESSION
CLINICAL_PROGRESSION: NOT CHANGED
CLINICAL_PROGRESSION: NOT CHANGED

## 2021-04-05 ASSESSMENT — PAIN DESCRIPTION - ONSET: ONSET: ON-GOING

## 2021-04-05 ASSESSMENT — PAIN SCALES - GENERAL
PAINLEVEL_OUTOF10: 0
PAINLEVEL_OUTOF10: 7

## 2021-04-05 ASSESSMENT — PAIN DESCRIPTION - LOCATION: LOCATION: CHEST

## 2021-04-05 ASSESSMENT — PAIN DESCRIPTION - FREQUENCY: FREQUENCY: CONTINUOUS

## 2021-04-05 ASSESSMENT — PAIN DESCRIPTION - DESCRIPTORS: DESCRIPTORS: STABBING

## 2021-04-05 ASSESSMENT — PAIN DESCRIPTION - ORIENTATION: ORIENTATION: MID

## 2021-04-05 NOTE — PLAN OF CARE
Problem: Pain:  Goal: Pain level will decrease  Description: Pain level will decrease  Outcome: Ongoing  Note: Patient denies pain this AM. PRN Tylenol available if needed. Goal: Control of acute pain  Description: Control of acute pain  Outcome: Ongoing  Note: Patient denies acute pain when asked. Pain goal 0/10. Goal: Control of chronic pain  Description: Control of chronic pain  Outcome: Ongoing  Note: Patient denies chronic pain when asked. Problem: Suicide risk  Goal: Provide patient with safe environment  Description: Provide patient with safe environment  Outcome: Ongoing  Note: Suicide sitter remains at bedside. Safe environment provided. Problem: Skin Integrity:  Goal: Skin integrity will stabilize  Description: Skin integrity will stabilize  Outcome: Ongoing  Note: Laceration noted to left hand. Scattered bruising also noted. Encouraging patient to turn and reposition to prevent skin breakdown. Problem: Discharge Planning:  Goal: Patients continuum of care needs are met  Description: Patients continuum of care needs are met  Outcome: Ongoing  Note: Plan for inpatient psych when medically stable. Psychiatry following. Problem: Safety:  Goal: Free from accidental physical injury  Description: Free from accidental physical injury  Outcome: Ongoing  Note: Patient free from falls. No injuries noted. Goal: Free from intentional harm  Description: Free from intentional harm  Outcome: Ongoing  Note: Patient free from harm. No injures noted. Care plan reviewed with patient. Patient verbalizes understanding of the plan of care and contributes to goal setting.

## 2021-04-05 NOTE — PROGRESS NOTES
**This is a psychiatric care coordination note. This is not to be used for billing purposes. **    Department of Psychiatry  Consult Service   Psychiatric Assessment      Thank you very much for allowing us to participate in the care of this patient. Reason for Consult:  Suicidal ideation    HISTORY OF PRESENT ILLNESS:          The patient is a 61 y.o. male with significant history of depression, bipolar disorder, substance induced mood disorder and polysubstance abuse who is admitted medically for chest pain. He presented to the ED on 4/5/2021 with a 2-day history of substernal/epigastric 8/10 pain which is nonradiating.  Patient's complaint of chest tightness and epigastric pain for the past 2 days.  4-day history of cocaine and alcohol binging.  UDS positive for cocaine metabolites Pt states that he did cocaine for multiple days in a row. Pt denies chest pain at this time. Pt understands that the cocaine was probably causing his chest pain. Psychiatry was consulted as the patient feels depressed and has suicidal ideation by cutting his throat. Patient reports he has been having suicidal ideation for the past couple weeks. He reports over that time, the suicidal ideation has become more frequent and intense. He states he has been having a plan with intent to cut his throat. He states he has just not been feeling good and nothing is going right for him regarding finances and his housing situation. He has been dealing with depression for the past 4 to 5 years. He reports his depression started after him and his wife . He states around that time, he lost a couple of kids back to back. He was previously seen [facility] but stopped seeing them as he moved out of [place]. He returned about a month ago but missed his appointment with [facility] in February due to increment weather. He reports he has not had an appointment at [facility] for over 2 years. He was previously seeing Dr. Nomi Stahl.   He reports this recent episode of depression started in December because he was not feeling good and had been off his psychiatric medications. He endorses feeling down and sad for more days and not since. He reports he only sleeps 3 to 4 hours a night. He has trouble staying asleep. He states he just wakes up out of the blue. He still feels tired most days when he wakes up in the morning. He states his energy has not been good throughout the day. Motivation has been poor. He states he takes him a while to get going for the day. His appetite has been increased. He denies any significant weight gain. He endorses anhedonia. He states he can even finish watching a movie and has to change the channel before it ends. He has been feeling worthless, hopeless and helpless. He states he has a history of bipolar disorder. He reports when he is manic, he is \"rage, really mad. \"  He reports he has gone days without sleep when sober. He states when he cannot sleep, he watches TV. Denies any increase in productivity, impulsitivity, increased energy, grandiosity during this time. He states these episodes of rage last 3 to 4 days. He states he has a history of cutting both of his wrists in 2013. He reports he was subsequently admitted to [place] [id number] behavioral unit following his suicide attempt. He was most recently [facility] in July 2018 for substance-induced mood disorder. He smokes marijuana daily. Uses cocaine 3 times a month. Has been using this amount for over 10 years. He reports his longest period of sobriety the past 10 years was 2 years. He drinks alcohol 4-5 times a week. He states when he does drink, he drinks 6 to 7 24 ounce cans of beer. He does get the shakes when he stops drinking. Has a history of withdrawal from alcohol.   He reports he has been through inpatient detoxification and rehabilitation in the past.  He states he was admitted to  for alcohol rehabilitation in the past.  UDS positive for cocaine only. Ifeanyi Mosley continues to be down and depressed today. He endorses current suicidal ideation without plan and intent. He is unable to contract for safety at this time. He states he would not feel safe if he goes home. He denies any homicidal ideation. She denies any hallucinations. States he does have history of hallucinating when he is high. Does not exhibit any symptoms of gary or hypomania on examination. No evidence of delusions or overt psychosis on examination. PSYCHIATRIC HISTORY:      · Outpatient psychiatric provider: No one currently. Previously followed with Dr. Iman Gresham at [facility] in the past  · Suicide attempts: 1 in 2013 by cutting bilateral wrists  · Inpatient psychiatric admissions: Multiple to [facility]. Most recently in July 2018. Prior to that, 09/2017, 07/2015, 06/2015, 07/2014, 07/2012, 11/2011    Past psychiatric medications includes:     Latuda  Celexa  Hydroxyzine  \"a whole bunch\"     Trazodone, Abilify per medical record    adverse reactions from psychotropic medications:    no      Lifetime Psychiatric Review of Systems   ·    Obsessions and Compulsions: Denies  ·    Gary or Hypomania: irritability, anger for 3-4 days at a time  ·    Hallucinations: yes when high  ·    Panic Attacks:  Denies  ·    Delusions:  Denies  ·    Phobias:  Denies  ·    Trauma: Denies    Prior to Admission medications    Medication Sig Start Date End Date Taking?  Authorizing Provider   naproxen (NAPROSYN) 500 MG tablet Take 1 tablet by mouth 2 times daily (with meals) for 30 doses 8/5/18 8/20/18  Jose Daniel Riojas APRN - [facility]   citalopram (CELEXA) 10 MG tablet Take 1 tablet by mouth daily 7/31/18 8/30/18  Osmani Luna MD   traZODone (DESYREL) 150 MG tablet Take 1 tablet by mouth nightly as needed for Sleep 7/31/18 8/30/18  Osmani Luna MD   ARIPiprazole (ABILIFY) 5 MG tablet Take 1 tablet by mouth daily 7/31/18 8/30/18  Osmani Luna MD Medications:    Current Facility-Administered Medications: albuterol (PROVENTIL) nebulizer solution 2.5 mg, 2.5 mg, Nebulization, 4x daily  sodium chloride flush 0.9 % injection 10 mL, 10 mL, Intravenous, 2 times per day  sodium chloride flush 0.9 % injection 10 mL, 10 mL, Intravenous, PRN  0.9 % sodium chloride infusion, 25 mL, Intravenous, PRN  enoxaparin (LOVENOX) injection 40 mg, 40 mg, Subcutaneous, Daily  promethazine (PHENERGAN) tablet 12.5 mg, 12.5 mg, Oral, Q6H PRN **OR** ondansetron (ZOFRAN) injection 4 mg, 4 mg, Intravenous, Q6H PRN  polyethylene glycol (GLYCOLAX) packet 17 g, 17 g, Oral, Daily PRN  acetaminophen (TYLENOL) tablet 650 mg, 650 mg, Oral, Q6H PRN **OR** acetaminophen (TYLENOL) suppository 650 mg, 650 mg, Rectal, Q6H PRN  pantoprazole (PROTONIX) tablet 40 mg, 40 mg, Oral, QAM AC  0.9 % sodium chloride infusion, , Intravenous, Continuous  amLODIPine (NORVASC) tablet 10 mg, 10 mg, Oral, Daily  aspirin chewable tablet 81 mg, 81 mg, Oral, Daily  nicotine (NICODERM CQ) 14 MG/24HR 1 patch, 1 patch, Transdermal, Daily     Past Medical History:        Diagnosis Date    Anxiety     Arthritis     Asthma     Chronic back pain 2004    L5-S1 dislocation w/subsequent surgery    Class 1 obesity due to excess calories without serious comorbidity with body mass index (BMI) of 32.0 to 32.9 in adult 4/5/2021    Depression     Hypertension     Microscopic hematuria     Movement disorder     Pulmonary tuberculosis     exposure from a cousin at the age of 10-11; treated. Past Surgical History:        Procedure Laterality Date    BACK SURGERY  2004    low back    LUNG BIOPSY  2012       Allergies: Patient has no known allergies. Social History:      RESIDENCE: She was born and raised in [place]. He was raised by his grandmother and mother. He has a brother and 2 half-sisters. He moved to [place] in 1997. He currently lives in [facility] alone  : .   Was previously  for 15 to 13 years    CHILDREN: None  OCCUPATION: Disabled for his back since 2017. Prior to that he worked at [employer]: Patient has had multiple drug charges in the past.  Served 2 years in retirement from [id number] for possession of controlled substance. SUBSTANCE USE HISTORY: See HPI        Family Medical and Psychiatric History:     Denies any significant family psychiatric history  Grandfather, father and brother-alcoholism      Problem Relation Age of Onset    Diabetes Mother     Diabetes Father     Cancer Maternal Grandmother         cervical         Physical  /69   Pulse 76   Temp 98.6 °F (37 °C) (Oral)   Resp 16   Ht 5' 10\" (1.778 m)   Wt 223 lb (101.2 kg)   SpO2 96%   BMI 32.00 kg/m²     General appearance: No apparent distress, appears stated age and cooperative. HEENT: Pupils equal, round, and reactive to light. Conjunctivae/corneas clear. Neck: Supple, with full range of motion. No jugular venous distention. Trachea midline. Respiratory:  Normal respiratory effort on RA. Wheezing throughout. Cardiovascular: Regular rate and rhythm with normal S1/S2 without murmurs, rubs or gallops. Abdomen: Soft, non-tender, non-distended with normal bowel sounds. Musculoskeletal: passive and active ROM x 4 extremities. Skin: Skin color, texture, turgor normal.  No rashes or lesions. Neurologic:  Neurovascularly intact without any focal sensory/motor deficits.  Cranial nerves: II-XII intact, grossly non-focal.  Capillary Refill: Brisk,< 3 seconds   Peripheral Pulses: +2 palpable, equal bilaterally     Mental Status Examination:  Level of consciousness:  Within normal limits  Appearance: hospital attire, lying in bed, fair grooming  Behavior/Motor:  no abnormalities noted  Attitude toward examiner:  cooperative, attentive and good eye contact  Speech:  Spontaneous, normal rate and volume  Mood: Depressed  Affect: blunted  Thought processes:  Linear, goal directed, coherent  Thought content: active suicidal ideations without current plan or intent, unable to contract for safety if discharged   Denies homicidal ideations    Denies hallucinations   No evidence of delusions  Cognition:  Oriented to self, situation, location, date  Concentration clinically adequate  Memory age appropriate  Insight & Judgment:  fair    DSM-5 DIAGNOSIS:    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  Rule out substance-induced mood disorder, bipolar disorder  Alcohol use disorder, severe with dependence  Polysubstance abuse-cocaine, cannabis    General Medical Condition  Patient Active Problem List   Diagnosis Code    Bronchial asthma J45.909    Bipolar 1 disorder, depressed F31.9    Cocaine abuse ([address] F14.10    Alcohol abuse, episodic F10.10    Rectal bleeding K62.5    Depression F32.9    Bipolar 1 disorder, depressed, moderate ([address] F31.32    Polysubstance abuse ([address] F19.10    Chest pain at rest R07.9    Microscopic hematuria R31.29    Bipolar II disorder, most recent episode major depressive ([address] F31.81    Substance induced mood disorder ([address] F19.94    Bipolar 1 disorder, depressed, severe ([address] F31.4    Cocaine use disorder, severe, dependence ([address] F14.20    Chest pain R07.9    Acute kidney injury (SILVESTRE) with acute tubular necrosis (ATN) ([address] N17.0    Class 1 obesity due to excess calories without serious comorbidity with body mass index (BMI) of 32.0 to 32.9 in adult E66.09, Z68.32       Stressors     Severity of stressors is severe  Source of stressors include: Other psychosocial and environmental stressors    RECOMMENDATIONS:    Continue 1:1 sitter  Transfer to 4E inpatient psychiatric unit once medically cleared  Additional recommendations will follow the clinical course. Thank you very much for allowing us to participate in the care of this patient. Time spent 45 min.       Electronically signed by Addie So PA-C on 4/5/21 at 9:54 AM EDT

## 2021-04-05 NOTE — ED NOTES
Pt in room sleeping at this time. Vitals reassessed, respirs normal. Call light in reach.       [employer]  04/04/21 2054

## 2021-04-05 NOTE — H&P
Patient: Marry Rivera    Unit/Bed: [facility]el[facility] Lynn    YOB: 1965    MRN: [id number]    Acct: [de-identified]     Admitting Diagnosis: Chest pain [R07.9]    Admit Date:  4/4/2021    CC: Chest pain x2 days and suicide ideation. HPI :  58-year-old male patient presented to the ED today with a 2-day history of substernal/epigastric 8/10 pain which is nonradiating. Patient's complaint of chest tightness and epigastric pain for the past 2 days. 4-day history of cocaine and alcohol binging. UDS positive for cocaine metabolites. Has no shortness of breath, felt diaphoretic, no palpitation. Has a cough with white sputum. In addition, he feels depressed and has suicidal ideation by cutting his throat. Chronic depression follows up with common. Initial vital signs temperature 37.0 °C, respiratory 20, heart rate 92, blood pressure 132/84, oxygen saturation 100% on room air. Initial labs sodium 136, potassium 3.8, CO2 23, BUN 22, serum creatinine 2.2 from [id number] 0100. Calcium 10.3, proBNP 161.8, troponin less than 0.010x2. WBC 6.7, hemoglobin 14.9, MCV 98.3, platelets 722. Urine drug screen positive for cocaine metabolites. A 12-lead EKG which reviewed shows normal sinus rhythm at 95 bpm.  QTc 457 ms. T wave inversion in V5 and V6. No ST segment elevation or depression. Chest x-ray which I reviewed is negative for infiltrate. Mild pulmonary congestion. ED treatment included Ativan 1 mg IV, heparin infusion. Review of Systems   Constitutional: Negative for activity change, appetite change, chills, diaphoresis, fatigue, fever and unexpected weight change. HENT: Negative for congestion, dental problem, drooling, ear discharge, ear pain, facial swelling, hearing loss, mouth sores, nosebleeds, postnasal drip, rhinorrhea, sinus pressure, sinus pain, sneezing, sore throat, tinnitus, trouble swallowing and voice change.     Eyes: Negative for photophobia, pain, discharge, redness, itching and visual disturbance. Respiratory: Negative for apnea, cough, choking, chest tightness, shortness of breath, wheezing and stridor. Cardiovascular: Positive for chest pain. Negative for palpitations and leg swelling. Gastrointestinal: Positive for abdominal pain. Negative for abdominal distention, anal bleeding, blood in stool, constipation, diarrhea, nausea, rectal pain and vomiting. Endocrine: Negative for cold intolerance, heat intolerance, polydipsia, polyphagia and polyuria. Genitourinary: Negative for decreased urine volume, difficulty urinating, discharge, dysuria, enuresis, flank pain, frequency, genital sores, hematuria, penile pain, penile swelling, scrotal swelling, testicular pain and urgency. Musculoskeletal: Negative for arthralgias, back pain, gait problem, joint swelling, myalgias, neck pain and neck stiffness. Skin: Negative for color change, pallor, rash and wound. Allergic/Immunologic: Negative for food allergies. Neurological: Negative for dizziness, tremors, seizures, syncope, facial asymmetry, speech difficulty, weakness, light-headedness, numbness and headaches. Hematological: Negative for adenopathy. Does not bruise/bleed easily. Psychiatric/Behavioral: Negative for agitation, behavioral problems, confusion, decreased concentration, dysphoric mood, hallucinations, self-injury, sleep disturbance and suicidal ideas. The patient is not nervous/anxious and is not hyperactive. Past Medical History:        Diagnosis Date    Anxiety     Arthritis     Asthma     Chronic back pain 2004    L5-S1 dislocation w/subsequent surgery    Depression     Hypertension     Microscopic hematuria     Movement disorder     Pulmonary tuberculosis     exposure from a cousin at the age of 6-9; treated.        Past Surgical History:        Procedure Laterality Date    BACK SURGERY  2004    low back    LUNG BIOPSY  2012       Medications Prior to Admission: movements intact. Conjunctiva/sclera: Conjunctivae normal.      Pupils: Pupils are equal, round, and reactive to light. Neck:      Musculoskeletal: Normal range of motion and neck supple. No neck rigidity or muscular tenderness. Vascular: No carotid bruit. Cardiovascular:      Rate and Rhythm: Normal rate and regular rhythm. Pulses: Normal pulses. Heart sounds: Normal heart sounds. No murmur. No friction rub. No gallop. Pulmonary:      Effort: Pulmonary effort is normal. No respiratory distress. Breath sounds: Normal breath sounds. No stridor. No wheezing, rhonchi or rales. Chest:      Chest wall: No tenderness. Abdominal:      General: Abdomen is flat. Bowel sounds are normal. There is no distension. Palpations: Abdomen is soft. There is no mass. Tenderness: There is abdominal tenderness. There is no right CVA tenderness, left CVA tenderness, guarding or rebound. Hernia: No hernia is present. Comments: Epigastric tenderness   Musculoskeletal: Normal range of motion. General: No swelling, tenderness, deformity or signs of injury. Right lower leg: No edema. Left lower leg: No edema. Lymphadenopathy:      Cervical: No cervical adenopathy. Skin:     General: Skin is warm and dry. Coloration: Skin is not jaundiced or pale. Findings: No bruising, erythema, lesion or rash. Neurological:      General: No focal deficit present. Mental Status: He is alert and oriented to person, place, and time. Mental status is at baseline. Cranial Nerves: No cranial nerve deficit. Sensory: No sensory deficit. Motor: No weakness. Coordination: Coordination normal.      Gait: Gait normal.      Deep Tendon Reflexes: Reflexes normal.   Psychiatric:         Mood and Affect: Mood normal.         Thought Content:  Thought content normal.         Judgment: Judgment normal.     :    Medications:       Continuous Infusions:   heparin (PORCINE) Infusion 10 Units/kg/hr (04/04/21 5949)       PRN Meds:heparin (porcine), heparin (porcine)    Data:    CBC:   Recent Labs     04/04/21  1616   WBC 6.7   RBC 4.62*   HGB 14.9   HCT 45.4   MCV 98.3*          BMP:   Recent Labs     04/04/21  1616      K 3.8   CL 96*   CO2 23   BUN 22   CREATININE 2.2*       PT/INR:   Recent Labs     04/04/21  1616   INR 1.03       LIVER PROFILE:   Recent Labs     04/04/21  1616   AST 57*   ALT 32   BILITOT 1.0   ALKPHOS 40      Results for Charles Adams (MRN [id number]) as of 4/5/2021 07:34   Ref. Range 4/4/2021 16:16   Hemoglobin A1C Latest Ref Range: 4.4 - 6.4 % 4.6   TSH Latest Ref Range: 0.400 - 4.200 uIU/mL 1.120     Results for Charles Adams (MRN [id number]) as of 4/5/2021 07:34   Ref. Range 4/5/2021 04:47   Lactic Acid Latest Ref Range: 0.5 - 2.2 mmol/L 1.0   Cholesterol, Total Latest Ref Range: 100 - 199 mg/dL 161   HDL Cholesterol Latest Units: mg/dL 48   LDL Calculated Latest Units: mg/dL 82   Triglycerides Latest Ref Range: 0 - 199 mg/dL 156     ABG. None. URINALYSIS. Results for Charles Adams (MRN [id number]) as of 4/4/2021 23:01   Ref. Range 4/4/2021 19:45   Color, UA Latest Ref Range: YELLOW-STRAW  YELLOW   Bilirubin, Urine Latest Ref Range: NEGATIVE  NEGATIVE   Ketones, Urine Latest Ref Range: NEGATIVE  40 (A)   Specific [place], UA Latest Ref Range: 1.002 - 1.030  1.023   Blood, Urine Latest Ref Range: NEGATIVE  MODERATE (A)   pH, UA Latest Ref Range: 5.0 - 9.0  5.5   Protein, UA Latest Ref Range: NEGATIVE mg/dl 30 (A)   Urobilinogen, Urine Latest Ref Range: 0.0 - 1.0 eu/dl 0.2   Nitrite, Urine Latest Ref Range: NEGATIVE  NEGATIVE   Leukocytes, UA Latest Ref Range: NEGATIVE  NEGATIVE   Glucose, Urine Latest Ref Range: NEGATIVE mg/dl NEGATIVE   Casts Latest Units: /lpf 8-15 C. GRAN   Mucus, UA Latest Ref Range: NONE SEEN/THREA  THREADS   WBC, UA Latest Ref Range: 0-4/hpf /hpf 0-2   RBC, UA Latest Ref Range: 0-2/hpf /hpf 0-2   Epithelial Cells, UA Latest Ref Range: 3-5/hpf /hpf 3-5   Renal Epithelial, UA Latest Ref Range: NONE SEEN  3-5   Bacteria, UA Latest Ref Range: FEW/NONE SEEN  NONE SEEN   Yeast, Urine Latest Ref Range: NONE SEEN  NONE SEEN   Crystals Latest Ref Range: NONE SEEN  NONE SEEN   Character, Urine Latest Ref Range: CLR-SL.CLOUD  SL CLOUDY   Miscellaneous Lab Test Result Unknown NONE SEEN       SEROLOGY  None. TUMOR MARKERS. None. MICROBIOLOGY   None. HISTOPATHOLOGY. None. TOXICOLOGY. Results for Olvin Madden (MRN [id number]) as of 4/4/2021 21:40   Ref. Range 4/4/2021 19:45   AMPHETAMINE+METHAMPHETAMINE URINE SCREEN Latest Ref Range: NEGATIVE  Negative   Barbiturate Quant, Ur Latest Ref Range: NEGATIVE  Negative   Benzodiazepine Quant, Ur Latest Ref Range: NEGATIVE  Negative   Cannabinoid Quant, Ur Latest Ref Range: NEGATIVE  Negative   Cocaine Metab Quant, Ur Latest Ref Range: NEGATIVE  POSITIVE   PCP Quant, Ur Latest Ref Range: NEGATIVE  Negative   Opiates, Urine Latest Ref Range: NEGATIVE  Negative   Oxycodone Latest Ref Range: NEGATIVE  Negative     Results for Olvin Madden (MRN [id number]) as of 4/4/2021 21:40   Ref. Range 4/4/2021 16:16   ETHYL ALCOHOL, SERUM Latest Ref Range: 0.00 % < 0.01       ENDOSCOPE STUDIES. None. PROCEDURES. None. RADIOLOGY. None. ASSESSMENT AND  PLAN. 1.PULMONARY. H/o asthma with possibly COPD without exacerbation-as needed nebs       2. CARDIOVASCULAR. Chest pain due to cocaine use vs ACS-Tele,ASA,Nitro,no beta-blockers. Hypertension-amlodipine and hydrochlorothiazide. 3.GI. Epigastric tenderness-GI cocktail, PPI, stool for heme test.    4.RENAL AND ELECTROLYTES. SILVESTRE due to ATN-IVF w/ NS. CK check to r/o cocaine induced rhabdo causing SILVESTRE    5. ENDO. TSH 1.120 and A1c 4.6.      6.PSYCH. Depression with suicidal ideation-one-to-one observation, psychiatry consult. Continue Celexa, trazodone and Abilify. 7. LIFE STYLE.        Active cocaine

## 2021-04-05 NOTE — ED NOTES
Pt resting in room at this time. Provided with extra blanket. Vitals reassessed, respirs normal. Denies further needs, call light in reach.       [employer]  04/04/21 7292

## 2021-04-05 NOTE — PROGRESS NOTES
Hospitalist Progress Note      Patient:  Erin Barnes    Unit/Bed:8B-36/036-A  YOB: 1965  MRN: [id number]   Acct: [de-identified]   PCP: No primary care provider on file. Date of Admission: 4/4/2021    Assessment/Plan:    1. Chest Pain: Pt states chest pain started Saturday night. Trops negative x3. EKG negative. Likely 2/2 cocaine. Tele. 2. SILVESTRE: Likely 2/2 dehydration. Pt states that he has not ate food since Wednesday night. Creatinine 2.2, 1.4. BMP in the AM. IVF. 3. Cocaine Abuse: UDS positive for cocaine. Pt states that he was drinking and doing cocaine from Wednesday to last night. 4. Suicidal Ideation: Psych consulted. Suicide sitter. 5. Essential HTN: BP has been liable. Continue home medications. 6. History of Asthma: Pt was wheezing on exam. Albuterol 4x daily. Chief Complaint: Chest Pain     Initial H and P:-    Initial H&P \"61year-old male patient presented to the ED today with a 2-day history of substernal/epigastric 8/10 pain which is nonradiating. Patient's complaint of chest tightness and epigastric pain for the past 2 days. 4-day history of cocaine and alcohol binging. UDS positive for cocaine metabolites.     Has no shortness of breath, felt diaphoretic, no palpitation. Has a cough with white sputum.     In addition, he feels depressed and has suicidal ideation by cutting his throat. Chronic depression follows up with common.     Initial vital signs temperature 37.0 °C, respiratory 20, heart rate 92, blood pressure 132/84, oxygen saturation 100% on room air.     Initial labs sodium 136, potassium 3.8, CO2 23, BUN 22, serum creatinine 2.2 from 1.010805 2018. Calcium 10.3, proBNP 161.8, troponin less than 0.010x2.     WBC 6.7, hemoglobin 14.9, MCV 98.3, platelets 080.     Urine drug screen positive for cocaine metabolites.        A 12-lead EKG which reviewed shows normal sinus rhythm at 95 bpm.  QTc 457 ms.   T wave

## 2021-04-06 ENCOUNTER — HOSPITAL ENCOUNTER (INPATIENT)
Age: 56
LOS: 7 days | Discharge: HOME | DRG: 751 | End: 2021-04-13
Attending: INTERNAL MEDICINE | Admitting: PSYCHIATRY & NEUROLOGY
Payer: MEDICAID

## 2021-04-06 VITALS
HEART RATE: 87 BPM | SYSTOLIC BLOOD PRESSURE: 147 MMHG | TEMPERATURE: 98.1 F | BODY MASS INDEX: 31.92 KG/M2 | RESPIRATION RATE: 18 BRPM | HEIGHT: 70 IN | OXYGEN SATURATION: 98 % | WEIGHT: 223 LBS | DIASTOLIC BLOOD PRESSURE: 77 MMHG

## 2021-04-06 PROBLEM — F33.9 MAJOR DEPRESSIVE DISORDER, RECURRENT (HCC): Status: ACTIVE | Noted: 2021-04-06

## 2021-04-06 LAB
ANION GAP SERPL CALCULATED.3IONS-SCNC: 8 MEQ/L (ref 8–16)
BUN BLDV-MCNC: 12 MG/DL (ref 7–22)
CALCIUM SERPL-MCNC: 8.7 MG/DL (ref 8.5–10.5)
CHLORIDE BLD-SCNC: 106 MEQ/L (ref 98–111)
CO2: 24 MEQ/L (ref 23–33)
CREAT SERPL-MCNC: 1 MG/DL (ref 0.4–1.2)
ERYTHROCYTE [DISTWIDTH] IN BLOOD BY AUTOMATED COUNT: 12.3 % (ref 11.5–14.5)
ERYTHROCYTE [DISTWIDTH] IN BLOOD BY AUTOMATED COUNT: 46 FL (ref 35–45)
GFR SERPL CREATININE-BSD FRML MDRD: > 90 ML/MIN/1.73M2
GLUCOSE BLD-MCNC: 94 MG/DL (ref 70–108)
HCT VFR BLD CALC: 38.4 % (ref 42–52)
HEMOCCULT STL QL: NEGATIVE
HEMOGLOBIN: 12.2 GM/DL (ref 14–18)
MCH RBC QN AUTO: 32.4 PG (ref 26–33)
MCHC RBC AUTO-ENTMCNC: 31.8 GM/DL (ref 32.2–35.5)
MCV RBC AUTO: 102.1 FL (ref 80–94)
PLATELET # BLD: 205 THOU/MM3 (ref 130–400)
PMV BLD AUTO: 9.7 FL (ref 9.4–12.4)
POTASSIUM REFLEX MAGNESIUM: 4 MEQ/L (ref 3.5–5.2)
RBC # BLD: 3.76 MILL/MM3 (ref 4.7–6.1)
SODIUM BLD-SCNC: 138 MEQ/L (ref 135–145)
WBC # BLD: 5.3 THOU/MM3 (ref 4.8–10.8)

## 2021-04-06 PROCEDURE — 82272 OCCULT BLD FECES 1-3 TESTS: CPT

## 2021-04-06 PROCEDURE — 36415 COLL VENOUS BLD VENIPUNCTURE: CPT

## 2021-04-06 PROCEDURE — G0378 HOSPITAL OBSERVATION PER HR: HCPCS

## 2021-04-06 PROCEDURE — 6370000000 HC RX 637 (ALT 250 FOR IP): Performed by: INTERNAL MEDICINE

## 2021-04-06 PROCEDURE — 6360000002 HC RX W HCPCS: Performed by: PHYSICIAN ASSISTANT

## 2021-04-06 PROCEDURE — 6360000002 HC RX W HCPCS: Performed by: INTERNAL MEDICINE

## 2021-04-06 PROCEDURE — 94760 N-INVAS EAR/PLS OXIMETRY 1: CPT

## 2021-04-06 PROCEDURE — 6370000000 HC RX 637 (ALT 250 FOR IP): Performed by: PSYCHIATRY & NEUROLOGY

## 2021-04-06 PROCEDURE — 1240000000 HC EMOTIONAL WELLNESS R&B

## 2021-04-06 PROCEDURE — 94640 AIRWAY INHALATION TREATMENT: CPT

## 2021-04-06 PROCEDURE — 96372 THER/PROPH/DIAG INJ SC/IM: CPT

## 2021-04-06 PROCEDURE — 99239 HOSP IP/OBS DSCHRG MGMT >30: CPT | Performed by: PHYSICIAN ASSISTANT

## 2021-04-06 PROCEDURE — 85027 COMPLETE CBC AUTOMATED: CPT

## 2021-04-06 PROCEDURE — 2580000003 HC RX 258: Performed by: INTERNAL MEDICINE

## 2021-04-06 PROCEDURE — 80048 BASIC METABOLIC PNL TOTAL CA: CPT

## 2021-04-06 RX ORDER — NICOTINE 21 MG/24HR
1 PATCH, TRANSDERMAL 24 HOURS TRANSDERMAL DAILY
Status: DISCONTINUED | OUTPATIENT
Start: 2021-04-07 | End: 2021-04-13 | Stop reason: HOSPADM

## 2021-04-06 RX ORDER — MAGNESIUM HYDROXIDE/ALUMINUM HYDROXICE/SIMETHICONE 120; 1200; 1200 MG/30ML; MG/30ML; MG/30ML
30 SUSPENSION ORAL EVERY 6 HOURS PRN
Status: DISCONTINUED | OUTPATIENT
Start: 2021-04-06 | End: 2021-04-13 | Stop reason: HOSPADM

## 2021-04-06 RX ORDER — IBUPROFEN 400 MG/1
400 TABLET ORAL EVERY 6 HOURS PRN
Status: DISCONTINUED | OUTPATIENT
Start: 2021-04-06 | End: 2021-04-13 | Stop reason: HOSPADM

## 2021-04-06 RX ORDER — ASPIRIN 81 MG/1
81 TABLET, CHEWABLE ORAL DAILY
Status: DISCONTINUED | OUTPATIENT
Start: 2021-04-07 | End: 2021-04-13 | Stop reason: HOSPADM

## 2021-04-06 RX ORDER — ACETAMINOPHEN 325 MG/1
650 TABLET ORAL EVERY 4 HOURS PRN
Status: DISCONTINUED | OUTPATIENT
Start: 2021-04-06 | End: 2021-04-13 | Stop reason: HOSPADM

## 2021-04-06 RX ORDER — PANTOPRAZOLE SODIUM 40 MG/1
40 TABLET, DELAYED RELEASE ORAL
Status: DISCONTINUED | OUTPATIENT
Start: 2021-04-07 | End: 2021-04-13 | Stop reason: HOSPADM

## 2021-04-06 RX ORDER — HYDROXYZINE HYDROCHLORIDE 25 MG/1
50 TABLET, FILM COATED ORAL 3 TIMES DAILY PRN
Status: DISCONTINUED | OUTPATIENT
Start: 2021-04-06 | End: 2021-04-13 | Stop reason: HOSPADM

## 2021-04-06 RX ORDER — PANTOPRAZOLE SODIUM 40 MG/1
40 TABLET, DELAYED RELEASE ORAL
Status: CANCELLED | OUTPATIENT
Start: 2021-04-07

## 2021-04-06 RX ORDER — ASPIRIN 81 MG/1
81 TABLET, CHEWABLE ORAL DAILY
Status: CANCELLED | OUTPATIENT
Start: 2021-04-07

## 2021-04-06 RX ORDER — TRAZODONE HYDROCHLORIDE 50 MG/1
50 TABLET ORAL NIGHTLY PRN
Status: DISCONTINUED | OUTPATIENT
Start: 2021-04-06 | End: 2021-04-13 | Stop reason: HOSPADM

## 2021-04-06 RX ORDER — AMLODIPINE BESYLATE 10 MG/1
10 TABLET ORAL DAILY
Status: DISCONTINUED | OUTPATIENT
Start: 2021-04-07 | End: 2021-04-13 | Stop reason: HOSPADM

## 2021-04-06 RX ORDER — NICOTINE 21 MG/24HR
1 PATCH, TRANSDERMAL 24 HOURS TRANSDERMAL DAILY
Status: CANCELLED | OUTPATIENT
Start: 2021-04-07

## 2021-04-06 RX ORDER — AMLODIPINE BESYLATE 10 MG/1
10 TABLET ORAL DAILY
Status: CANCELLED | OUTPATIENT
Start: 2021-04-07

## 2021-04-06 RX ADMIN — IBUPROFEN 400 MG: 400 TABLET, FILM COATED ORAL at 19:33

## 2021-04-06 RX ADMIN — ALBUTEROL SULFATE 2.5 MG: 2.5 SOLUTION RESPIRATORY (INHALATION) at 12:02

## 2021-04-06 RX ADMIN — ASPIRIN 81 MG: 81 TABLET, CHEWABLE ORAL at 08:26

## 2021-04-06 RX ADMIN — TRAZODONE HYDROCHLORIDE 50 MG: 50 TABLET ORAL at 20:42

## 2021-04-06 RX ADMIN — HYDROXYZINE HYDROCHLORIDE 50 MG: 25 TABLET ORAL at 20:42

## 2021-04-06 RX ADMIN — AMLODIPINE BESYLATE 10 MG: 10 TABLET ORAL at 08:26

## 2021-04-06 RX ADMIN — SODIUM CHLORIDE: 9 INJECTION, SOLUTION INTRAVENOUS at 05:24

## 2021-04-06 RX ADMIN — PANTOPRAZOLE SODIUM 40 MG: 40 TABLET, DELAYED RELEASE ORAL at 05:24

## 2021-04-06 RX ADMIN — ALBUTEROL SULFATE 2.5 MG: 2.5 SOLUTION RESPIRATORY (INHALATION) at 07:37

## 2021-04-06 RX ADMIN — ENOXAPARIN SODIUM 40 MG: 40 INJECTION SUBCUTANEOUS at 08:26

## 2021-04-06 ASSESSMENT — PAIN DESCRIPTION - PROGRESSION: CLINICAL_PROGRESSION: NOT CHANGED

## 2021-04-06 ASSESSMENT — PAIN DESCRIPTION - ORIENTATION: ORIENTATION: LOWER

## 2021-04-06 ASSESSMENT — PATIENT HEALTH QUESTIONNAIRE - PHQ9: SUM OF ALL RESPONSES TO PHQ QUESTIONS 1-9: 16

## 2021-04-06 ASSESSMENT — SLEEP AND FATIGUE QUESTIONNAIRES
AVERAGE NUMBER OF SLEEP HOURS: 4
DO YOU USE A SLEEP AID: NO

## 2021-04-06 ASSESSMENT — PAIN DESCRIPTION - PAIN TYPE: TYPE: CHRONIC PAIN

## 2021-04-06 ASSESSMENT — PAIN SCALES - GENERAL: PAINLEVEL_OUTOF10: 0

## 2021-04-06 ASSESSMENT — PAIN DESCRIPTION - ONSET: ONSET: ON-GOING

## 2021-04-06 ASSESSMENT — PAIN DESCRIPTION - LOCATION: LOCATION: BACK

## 2021-04-06 ASSESSMENT — PAIN DESCRIPTION - DESCRIPTORS: DESCRIPTORS: ACHING

## 2021-04-06 ASSESSMENT — PAIN DESCRIPTION - FREQUENCY: FREQUENCY: CONTINUOUS

## 2021-04-06 ASSESSMENT — PAIN - FUNCTIONAL ASSESSMENT: PAIN_FUNCTIONAL_ASSESSMENT: PREVENTS OR INTERFERES SOME ACTIVE ACTIVITIES AND ADLS

## 2021-04-06 NOTE — CONSULTS
Department of Psychiatry  Consult Service   Psychiatric Assessment        Thank you very much for allowing us to participate in the care of this patient.       Reason for Consult:  Suicidal ideation     HISTORY OF PRESENT ILLNESS:           The patient is a 61 y.o. male with significant history of depression, bipolar disorder, substance induced mood disorder and polysubstance abuse who is admitted medically for chest pain. He presented to the ED on 4/5/2021 with a 2-day history of substernal/epigastric 8/10 pain which is nonradiating.  Patient's complaint of chest tightness and epigastric pain for the past 2 days.  4-day history of cocaine and alcohol binging.  UDS positive for cocaine metabolites Pt states that he did cocaine for multiple days in a row. Pt denies chest pain at this time. Pt understands that the cocaine was probably causing his chest pain. Psychiatry was consulted as the patient feels depressed and has suicidal ideation by cutting his throat.     Patient reports he has been having suicidal ideation for the past couple weeks. He reports over that time, the suicidal ideation has become more frequent and intense. He states he has been having a plan with intent to cut his throat. He states he has just not been feeling good and nothing is going right for him regarding finances and his housing situation. He has been dealing with depression for the past 4 to 5 years. He reports his depression started after him and his wife . He states around that time, he lost a couple of kids back to back. He was previously seen [facility] but stopped seeing them as he moved out of [place]. He returned about a month ago but missed his appointment with [facility] in February due to [place] weather. He reports he has not had an appointment at [facility] for over 2 years. He was previously seeing Dr. Judith Red.   He reports this recent episode of depression started in December because he was not feeling good and had been off his psychiatric medications. He endorses feeling down and sad for more days and not since. He reports he only sleeps 3 to 4 hours a night. He has trouble staying asleep. He states he just wakes up out of the blue. He still feels tired most days when he wakes up in the morning. He states his energy has not been good throughout the day. Motivation has been poor. He states he takes him a while to get going for the day. His appetite has been increased. He denies any significant weight gain. He endorses anhedonia. He states he can even finish watching a movie and has to change the channel before it ends. He has been feeling worthless, hopeless and helpless. He states he has a history of bipolar disorder. He reports when he is manic, he is \"rage, really mad. \"  He reports he has gone days without sleep when sober. He states when he cannot sleep, he watches TV. Denies any increase in productivity, impulsitivity, increased energy, grandiosity during this time. He states these episodes of rage last 3 to 4 days. He states he has a history of cutting both of his wrists in 2013. He reports he was subsequently admitted to [place] [address] behavioral unit following his suicide attempt. He was most recently admitted to [facility] behavioral unit in July 2018 for substance-induced mood disorder. He smokes marijuana daily. Uses cocaine 3 times a month. Has been using this amount for over 10 years. He reports his longest period of sobriety the past 10 years was 2 years. He drinks alcohol 4-5 times a week. He states when he does drink, he drinks 6 to 7 24 ounce cans of beer. He does get the shakes when he stops drinking. Has a history of withdrawal from alcohol. He reports he has been through inpatient detoxification and rehabilitation in the past.  He states he was admitted to  for alcohol rehabilitation in the past.  UDS positive for cocaine only.  Jose Donnelly continues to be down and depressed today. He endorses current suicidal ideation without plan and intent. He is unable to contract for safety at this time. He states he would not feel safe if he goes home. He denies any homicidal ideation. She denies any hallucinations. States he does have history of hallucinating when he is high. Does not exhibit any symptoms of gary or hypomania on examination. No evidence of delusions or overt psychosis on examination.     PSYCHIATRIC HISTORY:      · Outpatient psychiatric provider: No one currently. Previously followed with Dr. Iman Gresham at [facility] in the past  · Suicide attempts: 1 in 2013 by cutting bilateral wrists  · Inpatient psychiatric admissions: Multiple to [facility]. Most recently in July 2018. Prior to that, 09/2017, 07/2015, 06/2015, 07/2014, 07/2012, 11/2011     Past psychiatric medications includes:      Latuda  Celexa  Hydroxyzine  \"a whole bunch\"      Trazodone, Abilify per medical record     adverse reactions from psychotropic medications:     no        Lifetime Psychiatric Review of Systems   ·    Obsessions and Compulsions: Denies  ·    Gary or Hypomania: irritability, anger for 3-4 days at a time  ·    Hallucinations: yes when high  ·    Panic Attacks:  Denies  ·    Delusions:  Denies  ·    Phobias:  Denies  ·    Trauma: Denies     Home Medications           Prior to Admission medications    Medication Sig Start Date End Date Taking?  Authorizing Provider   naproxen (NAPROSYN) 500 MG tablet Take 1 tablet by mouth 2 times daily (with meals) for 30 doses 8/5/18 8/20/18   Jose Daniel Riojas, APRN - CNP   citalopram (CELEXA) 10 MG tablet Take 1 tablet by mouth daily 7/31/18 8/30/18   Osmani Luna MD   traZODone (DESYREL) 150 MG tablet Take 1 tablet by mouth nightly as needed for Sleep 7/31/18 8/30/18   Osmani Luna MD   ARIPiprazole (ABILIFY) 5 MG tablet Take 1 tablet by mouth daily 7/31/18 8/30/18   Osmani Luna MD            Medications:      Current Hospital Medications Current Facility-Administered Medications: albuterol (PROVENTIL) nebulizer solution 2.5 mg, 2.5 mg, Nebulization, 4x daily  sodium chloride flush 0.9 % injection 10 mL, 10 mL, Intravenous, 2 times per day  sodium chloride flush 0.9 % injection 10 mL, 10 mL, Intravenous, PRN  0.9 % sodium chloride infusion, 25 mL, Intravenous, PRN  enoxaparin (LOVENOX) injection 40 mg, 40 mg, Subcutaneous, Daily  promethazine (PHENERGAN) tablet 12.5 mg, 12.5 mg, Oral, Q6H PRN **OR** ondansetron (ZOFRAN) injection 4 mg, 4 mg, Intravenous, Q6H PRN  polyethylene glycol (GLYCOLAX) packet 17 g, 17 g, Oral, Daily PRN  acetaminophen (TYLENOL) tablet 650 mg, 650 mg, Oral, Q6H PRN **OR** acetaminophen (TYLENOL) suppository 650 mg, 650 mg, Rectal, Q6H PRN  pantoprazole (PROTONIX) tablet 40 mg, 40 mg, Oral, QAM AC  0.9 % sodium chloride infusion, , Intravenous, Continuous  amLODIPine (NORVASC) tablet 10 mg, 10 mg, Oral, Daily  aspirin chewable tablet 81 mg, 81 mg, Oral, Daily  nicotine (NICODERM CQ) 14 MG/24HR 1 patch, 1 patch, Transdermal, Daily         Past Medical History:    Past Medical History             Diagnosis Date    Anxiety      Arthritis      Asthma      Chronic back pain 2004     L5-S1 dislocation w/subsequent surgery    Class 1 obesity due to excess calories without serious comorbidity with body mass index (BMI) of 32.0 to 32.9 in adult 4/5/2021    Depression      Hypertension      Microscopic hematuria      Movement disorder      Pulmonary tuberculosis       exposure from a cousin at the age of 6-9; treated.            Past Surgical History:    Past Surgical History             Procedure Laterality Date    BACK SURGERY   2004     low back    LUNG BIOPSY   2012            Allergies: Patient has no known allergies.       Social History:       RESIDENCE: She was born and raised in [place]. He was raised by his grandmother and mother. He has a brother and 2 half-sisters. He moved to [place] in 1997.   He currently lives in [place] alone  : . 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  Served 2 years in [employer] from [id number] for possession of controlled substance.     SUBSTANCE USE HISTORY: See HPI           Family Medical and Psychiatric History:      Denies any significant family psychiatric history  Grandfather, father and brother-alcoholism  Family History             Problem Relation Age of Onset    Diabetes Mother      Diabetes Father      Cancer Maternal Grandmother           cervical               Physical  /69   Pulse 76   Temp 98.6 °F (37 °C) (Oral)   Resp 16   Ht 5' 10\" (1.778 m)   Wt 223 lb (101.2 kg)   SpO2 96%   BMI 32.00 kg/m²      General appearance: No apparent distress, appears stated age and cooperative. HEENT: Pupils equal, round, and reactive to light. Conjunctivae/corneas clear. Neck: Supple, with full range of motion. No jugular venous distention. Trachea midline. Respiratory:  Normal respiratory effort on RA. Wheezing throughout.   Cardiovascular: Regular rate and rhythm with normal S1/S2 without murmurs, rubs or gallops. Abdomen: Soft, non-tender, non-distended with normal bowel sounds. Musculoskeletal: passive and active ROM x 4 extremities. Skin: Skin color, texture, turgor normal.  No rashes or lesions. Neurologic:  Neurovascularly intact without any focal sensory/motor deficits.  Cranial nerves: II-XII intact, grossly non-focal.  Capillary Refill: Brisk,< 3 seconds   Peripheral Pulses: +2 palpable, equal bilaterally      Mental Status Examination:  Level of consciousness:  Within normal limits  Appearance: hospital attire, lying in bed, fair grooming  Behavior/Motor:  no abnormalities noted  Attitude toward examiner:  cooperative, attentive and good eye contact  Speech:  Spontaneous, normal rate and volume  Mood: Depressed  Affect: blunted  Thought processes:  Linear, goal directed, coherent  Thought content: active suicidal ideations without current plan or intent, unable to contract for safety if discharged              Denies homicidal ideations               Denies hallucinations              No evidence of delusions  Cognition:  Oriented to self, situation, location, date  Concentration clinically adequate  Memory age appropriate  Insight & Judgment:  fair     DSM-5 DIAGNOSIS:    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  Rule out substance-induced mood disorder, bipolar disorder  Alcohol use disorder, severe with dependence  Polysubstance abuse-cocaine, cannabis     General Medical Condition       Patient Active Problem List   Diagnosis Code    Bronchial asthma J45.909    Bipolar 1 disorder, depressed F31.9    Cocaine abuse ([address] F14.10    Alcohol abuse, episodic F10.10    Rectal bleeding K62.5    Depression F32.9    Bipolar 1 disorder, depressed, moderate ([address] F31.32    Polysubstance abuse ([address] F19.10    Chest pain at rest R07.9    Microscopic hematuria R31.29    Bipolar II disorder, most recent episode major depressive ([address] F31.81    Substance induced mood disorder ([address] F19.94    Bipolar 1 disorder, depressed, severe ([address] F31.4    Cocaine use disorder, severe, dependence ([address] F14.20    Chest pain R07.9    Acute kidney injury (SILVESTRE) with acute tubular necrosis (ATN) ([address] N17.0    Class 1 obesity due to excess calories without serious comorbidity with body mass index (BMI) of 32.0 to 32.9 in adult E66.09, Z68.32         Stressors     Severity of stressors is severe  Source of stressors include:   Other psychosocial and environmental stressors     RECOMMENDATIONS:    Continue 1:1 sitter  Transfer to 4E inpatient psychiatric unit once medically cleared  Additional recommendations will follow the

## 2021-04-06 NOTE — GROUP NOTE
Group Therapy Note    Date: 4/6/2021    Group Start Time: 1500  Group End Time: 4178  Group Topic: Healthy Living/Wellness    [facility] 4E    Anna Baig LPN        Group Therapy Note    Attendees: 1      Notes:  Did not attend    Discipline Responsible: Licensed Practical Nurse      Signature:  Anna Baig LPN

## 2021-04-06 NOTE — PLAN OF CARE
Problem: Pain:  Goal: Pain level will decrease  Description: Pain level will decrease  4/5/2021 2359 by Poli Owusu RN  Outcome: Ongoing  Note: Pt complains of pain at a 0/10. Non pharmacological interventions completed which include rest, and repositioning. Pt states pain goal at a 0/10. Pain assessed every 4 hours, and as needed. PRN pain medications given as ordered. Problem: Pain:  Goal: Control of acute pain  Description: Control of acute pain  4/5/2021 2359 by Poli Owusu RN  Outcome: Ongoing  Note: Pt complains of pain at a 0/10. Non pharmacological interventions completed which include rest, and repositioning. Pt states pain goal at a 0/10. Pain assessed every 4 hours, and as needed. PRN pain medications given as ordered. Problem: Pain:  Goal: Control of chronic pain  Description: Control of chronic pain  4/5/2021 2359 by Poli Owusu RN  Outcome: Ongoing  Note: Pt complains of pain at a 0/10. Non pharmacological interventions completed which include rest, and repositioning. Pt states pain goal at a 0/10. Pain assessed every 4 hours, and as needed. PRN pain medications given as ordered. Problem: Suicide risk  Goal: Provide patient with safe environment  Description: Provide patient with safe environment  4/5/2021 2359 by Poli Owusu RN  Outcome: Ongoing  Note: Suicide sitter at bedside. Problem: Skin Integrity:  Goal: Skin integrity will stabilize  Description: Skin integrity will stabilize  4/5/2021 2359 by Poli Owusu RN  Outcome: Ongoing  Note: No skin issues noted. Problem: Discharge Planning:  Goal: Patients continuum of care needs are met  Description: Patients continuum of care needs are met  4/5/2021 2359 by Poli Owusu RN  Outcome: Ongoing  Note: Pt continuum of care needs met this shift.      Problem: Safety:  Goal: Free from accidental physical injury  Description: Free from accidental physical injury  4/5/2021 2359 by Poli Owusu RN  Outcome: Ongoing  Note: Suicide sitter at bedside. Pt free from physical injury. Problem: Safety:  Goal: Free from intentional harm  Description: Free from intentional harm  4/5/2021 2359 by Dionne Patel RN  Outcome: Ongoing  Note: Pt free from intentional harm. Care plan reviewed with patient. Patient verbalize understanding of the plan of care and contribute to goal setting.

## 2021-04-06 NOTE — PLAN OF CARE
Problem: Impaired respiratory status  Goal: Clear lung sounds  Description: Clear lung sounds  4/5/2021 2014 by Mely Morris RCP  Outcome: Met This Shift  Note:  Patient lung sounds are considered normal for their current lung condition. No signs of distress noted.  Current treatment regimen appropriate

## 2021-04-06 NOTE — CARE COORDINATION
4/6/21, 8:31 AM EDT  DISCHARGE PLANNING EVALUATION:    Melisa Juan       Admitted: 4/4/2021/ [facility] day: 0   Location: [address] [place] [address]A Reason for admit: Chest pain [R07.9]   PMH:  has a past medical history of Anxiety, Arthritis, Asthma, Chronic back pain, Class 1 obesity due to excess calories without serious comorbidity with body mass index (BMI) of 32.0 to 32.9 in adult, Depression, Hypertension, Microscopic hematuria, Movement disorder, and Pulmonary tuberculosis. Procedure: No  Barriers to Discharge: To ER with CP post cocaine and ETOH binge. Feeling depressed. Suicidal Ideation. PCP: No primary care provider on file. Patient Goals/Plan/Treatment Preferences: Case Management visit deferred as pt will be transferred to [address] [place] [address] when medically stable. Transportation/Food Security/Housekeeping Addressed:  No issues identified.

## 2021-04-06 NOTE — PROGRESS NOTES
Behavioral Health   Admission Note     Admission Type:   Admission Type: Voluntary    Reason for admission:  Reason for Admission: thought about really killing myself    PATIENT STRENGTHS:  Strengths: Communication, Connection to output provider, Positive Support    Patient Strengths and Limitations:  Limitations: Tendency to isolate self    Addictive Behavior:   Addictive Behavior  In the past 3 months, have you felt or has someone told you that you have a problem with:  : None  Do you have a history of Chemical Use?: No  Do you have a history of Alcohol Use?: Yes  Do you have a history of Street Drug Abuse?: Yes  Histroy of Prescripton Drug Abuse?: No    Medical Problems:   Past Medical History:   Diagnosis Date    Anxiety     Arthritis     Asthma     Chronic back pain 2004    L5-S1 dislocation w/subsequent surgery    Class 1 obesity due to excess calories without serious comorbidity with body mass index (BMI) of 32.0 to 32.9 in adult 4/5/2021    Depression     Hypertension     Microscopic hematuria     Movement disorder     Pulmonary tuberculosis     exposure from a cousin at the age of 10-11; treated. Status EXAM:  Status and Exam  Normal: No  Facial Expression: Flat, Sad  Affect: Blunt  Level of Consciousness: Alert  Mood:Normal: No(mood 4/10)  Mood: Depressed, Anxious, Sad  Motor Activity:Normal: Yes  Interview Behavior: Cooperative  Preception: Eunice to Person, Eunice to Time, Eunice to Place, Eunice to Situation  Attention:Normal: Yes  Thought Content:Normal: Yes  Hallucinations:  Auditory (Comment), Visual (Comment)(sees people and hearvoicea bossing him around telling him to use drugs)  Delusions: No  Memory:Normal: No  Memory: Poor Recent  Insight and Judgment: No  Insight and Judgment: Poor Judgment, Poor Insight  Present Suicidal Ideation: Yes  Present Homicidal Ideation: No    Pt admitted with followings belongings:  Dentures: None  Vision - Corrective Lenses: None(left at home) Jewelry: None  Clothing: Footwear, Socks, Shirt, Pants  Other Valuables: Wallet, Money (Comment)($10)     Admission order obtained yes. Valuables sent home with n/a. Valuables placed in safe in security envelope, number:  n/a. Patient's home medications were patient has not been taking his medications \"for years\" as he states. Patient oriented to surroundings and program expectations and copy of patient rights given. Received admission packet:  yes. Consents reviewed, signed yes. \"An Important Message from [employer] About Your Rights\" form reviewed, signed N/A. Patient verbalize understanding:  yes. Patient education on precautions: yes           Patient screened positive for suicide risk on CSSR-S (\"yes\" to question #4, 5, OR 6)  es. Physician notified of risk score, yes. No new orders obtained. .  2 person skin assessment completed upon admission patient denies skin issues and 2 person assessment. Explained patients right to have family, representative or physician notified of their admission. Patient has Declined for physician to be notified. Patient has Declined for family/representative to be notified. Provided pt with [employer] handout entitled \"Quitting Smoking. \"  Reviewed handout with pt addressing dangers of smoking, developing coping skills, and providing basic information about quitting. Pt response to counseling:  Patient does not want to quit smoking        Discharge readmitted from 36. Admitted for depression, has thoughts of cutting his throat, able to contract for safety. Has history of alcohol and cocaine use.     Shawn Charlton RN

## 2021-04-06 NOTE — FLOWSHEET NOTE
04/06/21 1236   Encounter Summary   Services provided to: Patient   Continue Visiting Yes  (4/6)   Complexity of Encounter Low   Length of Encounter 15 minutes   Routine   Type Initial   Assessment Sleeping   Intervention Prayer   During my encounter with the 61 yr old patient, I attempted to visit with the pt on 8B. The patient appears to be resting now and I didnt want to disturb the patient. The pt was admitted to chest pains. The pt will be transferred to . I or another Alina Estrella will attempt to visit the patient or the family at another time.

## 2021-04-06 NOTE — PROGRESS NOTES
Voluntary admission form signed per patient. Report called to Levar Hansen on 4E. All questions answered. Patient to 4E per transport and security.

## 2021-04-06 NOTE — DISCHARGE SUMMARY
which reviewed shows normal sinus rhythm at 95 bpm.  QTc 457 ms.  T wave inversion in V5 and V6.  No ST segment elevation or depression.     Chest x-ray which I reviewed is negative for infiltrate.  Mild pulmonary congestion.       ED treatment included Ativan 1 mg IV, heparin infusion. \"     4/5: Pt was admitted through the night. Pt states that he did cocaine for multiple days in a row. PT denies chest pain at this time. Pt understands that the cocaine was probably causing his chest pain. 4/6: Pt's creatinine normalized. Psych was okay with admit to 4E. FOBT negative. Pt was alert and oriented. Physical Exam:-  General appearance: No apparent distress, appears stated age and cooperative. HEENT: Pupils equal, round, and reactive to light. Conjunctivae/corneas clear. Neck: Supple, with full range of motion. No jugular venous distention. Trachea midline. Respiratory:  Normal respiratory effort on RA. Wheezing throughout. Cardiovascular: Regular rate and rhythm with normal S1/S2 without murmurs, rubs or gallops. Abdomen: Soft, non-tender, non-distended with normal bowel sounds. Musculoskeletal: passive and active ROM x 4 extremities. Skin: Skin color, texture, turgor normal.  No rashes or lesions. Neurologic:  Neurovascularly intact without any focal sensory/motor deficits.  Cranial nerves: II-XII intact, grossly non-focal.  Psychiatric: Alert and oriented  Capillary Refill: Brisk,< 3 seconds   Peripheral Pulses: +2 palpable, equal bilaterally     Vitals:   Patient Vitals for the past 24 hrs:   BP Temp Temp src Pulse Resp SpO2   04/06/21 1104 (!) 147/77 98.1 °F (36.7 °C) Oral 87 18 98 %   04/06/21 0820 119/77 97.6 °F (36.4 °C) Axillary 64 18 97 %   04/06/21 0737 -- -- -- -- -- 97 %   04/06/21 0400 128/77 98.2 °F (36.8 °C) Oral 64 18 98 %   04/05/21 2358 134/74 98.9 °F (37.2 °C) Oral 69 16 99 %   04/05/21 2046 122/87 98 °F (36.7 °C) Axillary 64 16 99 %   04/05/21 2011 -- -- -- -- 16 99 %   04/05/21 1519 111/71 98.9 °F (37.2 °C) Oral 64 18 99 %     Weight:   Weight: 223 lb (101.2 kg)   24 hour intake/output:     Intake/Output Summary (Last 24 hours) at 4/6/2021 1410  Last data filed at 4/6/2021 1338  Gross per 24 hour   Intake 3382.94 ml   Output 0 ml   Net 3382.94 ml     Discharge Medications:-      Medication List      ASK your doctor about these medications    ARIPiprazole 5 MG tablet  Commonly known as: ABILIFY  Take 1 tablet by mouth daily     citalopram 10 MG tablet  Commonly known as: CELEXA  Take 1 tablet by mouth daily     naproxen 500 MG tablet  Commonly known as: Naprosyn  Take 1 tablet by mouth 2 times daily (with meals) for 30 doses     traZODone 150 MG tablet  Commonly known as: DESYREL  Take 1 tablet by mouth nightly as needed for Sleep             Labs :-  Recent Results (from the past 72 hour(s))   EKG 12 Lead    Collection Time: 04/04/21  3:17 PM   Result Value Ref Range    Ventricular Rate 95 BPM    Atrial Rate 95 BPM    P-R Interval 152 ms    QRS Duration 84 ms    Q-T Interval 364 ms    QTc Calculation (Bazett) 457 ms    P Axis 56 degrees    R Axis 15 degrees    T Axis 56 degrees   Ethanol    Collection Time: 04/04/21  4:16 PM   Result Value Ref Range    ETHYL ALCOHOL, SERUM < 0.01 0.00 %   Brain Natriuretic Peptide    Collection Time: 04/04/21  4:16 PM   Result Value Ref Range    Pro-.8 0.0 - 900.0 pg/mL   Protime-INR    Collection Time: 04/04/21  4:16 PM   Result Value Ref Range    INR 1.03 0.85 - 1.13   APTT    Collection Time: 04/04/21  4:16 PM   Result Value Ref Range    aPTT 28.3 22.0 - 38.0 seconds   Troponin    Collection Time: 04/04/21  4:16 PM   Result Value Ref Range    Troponin T < 0.010 ng/ml   D-Dimer, Quantitative    Collection Time: 04/04/21  4:16 PM   Result Value Ref Range    D-Dimer, Quant 512.00 (H) 0.00 - 500.00 ng/ml FEU   CBC Auto Differential    Collection Time: 04/04/21  4:16 PM   Result Value Ref Range    WBC 6.7 4.8 - 10.8 thou/mm3    RBC 4.62 (L) 4.70 - 6.10 mill/mm3    Hemoglobin 14.9 14.0 - 18.0 gm/dl    Hematocrit 45.4 42.0 - 52.0 %    MCV 98.3 (H) 80.0 - 94.0 fL    MCH 32.3 26.0 - 33.0 pg    MCHC 32.8 32.2 - 35.5 gm/dl    RDW-CV 12.0 11.5 - 14.5 %    RDW-SD 43.7 35.0 - 45.0 fL    Platelets 734 926 - 050 thou/mm3    MPV 9.0 (L) 9.4 - 12.4 fL    Seg Neutrophils 57.3 %    Lymphocytes 31.3 %    Monocytes 10.1 %    Eosinophils 0.7 %    Basophils 0.3 %    Immature Granulocytes 0.3 %    Segs Absolute 3.8 1 - 7 thou/mm3    Lymphocytes Absolute 2.1 1.0 - 4.8 thou/mm3    Monocytes Absolute 0.7 0.4 - 1.3 thou/mm3    Eosinophils Absolute 0.0 0.0 - 0.4 thou/mm3    Basophils Absolute 0.0 0.0 - 0.1 thou/mm3    Immature Grans (Abs) 0.02 0.00 - 0.07 thou/mm3    nRBC 0 /100 wbc   Comprehensive Metabolic Panel w/ Reflex to MG    Collection Time: 04/04/21  4:16 PM   Result Value Ref Range    Glucose 90 70 - 108 mg/dL    CREATININE 2.2 (H) 0.4 - 1.2 mg/dL    BUN 22 7 - 22 mg/dL    Sodium 136 135 - 145 meq/L    Potassium reflex Magnesium 3.8 3.5 - 5.2 meq/L    Chloride 96 (L) 98 - 111 meq/L    CO2 23 23 - 33 meq/L    Calcium 10.3 8.5 - 10.5 mg/dL    AST 57 (H) 5 - 40 U/L    Alkaline Phosphatase 40 38 - 126 U/L    Total Protein 8.3 (H) 6.1 - 8.0 g/dL    Albumin 4.6 3.5 - 5.1 g/dL    Total Bilirubin 1.0 0.3 - 1.2 mg/dL    ALT 32 11 - 66 U/L   Glomerular Filtration Rate, Estimated    Collection Time: 04/04/21  4:16 PM   Result Value Ref Range    Est, Glom Filt Rate 37 (A) ml/min/1.73m2   Osmolality    Collection Time: 04/04/21  4:16 PM   Result Value Ref Range    Osmolality Calc 274.8 (L) 275.0 - 300.0 mOsmol/kg   Anion Gap    Collection Time: 04/04/21  4:16 PM   Result Value Ref Range    Anion Gap 17.0 (H) 8.0 - 16.0 meq/L   Hemoglobin A1C    Collection Time: 04/04/21  4:16 PM   Result Value Ref Range    Hemoglobin A1C 4.6 4.4 - 6.4 %    AVERAGE GLUCOSE 78 70 - 126 mg/dL   TSH with Reflex    Collection Time: 04/04/21  4:16 PM   Result Value Ref Range    TSH 1.120 0.400 - 4.200 uIU/mL Troponin    Collection Time: 04/04/21  7:00 PM   Result Value Ref Range    Troponin T < 0.010 ng/ml   EKG 12 Lead    Collection Time: 04/04/21  7:27 PM   Result Value Ref Range    Ventricular Rate 76 BPM    Atrial Rate 76 BPM    P-R Interval 178 ms    QRS Duration 64 ms    Q-T Interval 342 ms    QTc Calculation (Bazett) 384 ms    P Axis 48 degrees    R Axis 33 degrees    T Axis 36 degrees   Urine Drug Screen    Collection Time: 04/04/21  7:45 PM   Result Value Ref Range    AMPHETAMINE+METHAMPHETAMINE URINE SCREEN Negative NEGATIVE    Barbiturate Quant, Ur Negative NEGATIVE    Benzodiazepine Quant, Ur Negative NEGATIVE    Cannabinoid Quant, Ur Negative NEGATIVE    Cocaine Metab Quant, Ur POSITIVE NEGATIVE    Opiates, Urine Negative NEGATIVE    Oxycodone Negative NEGATIVE    PCP Quant, Ur Negative NEGATIVE   Microscopic Urinalysis    Collection Time: 04/04/21  7:45 PM   Result Value Ref Range    Glucose, Urine NEGATIVE NEGATIVE mg/dl    Bilirubin Urine NEGATIVE NEGATIVE    Ketones, Urine 40 (A) NEGATIVE    Specific Gravity, UA 1.023 1.002 - 1.030    Blood, Urine MODERATE (A) NEGATIVE    pH, UA 5.5 5.0 - 9.0    Protein, UA 30 (A) NEGATIVE mg/dl    Urobilinogen, Urine 0.2 0.0 - 1.0 eu/dl    Nitrite, Urine NEGATIVE NEGATIVE    Leukocytes, UA NEGATIVE NEGATIVE    Color, UA YELLOW YELLOW-STRAW    Character, Urine SL CLOUDY [id number]    RBC, UA 0-2 0-2/hpf /hpf    WBC, UA 0-2 0-4/hpf /hpf    Epithelial Cells, UA 3-5 3-5/hpf /hpf    Mucus, UA THREADS NONE SEEN/THREA    Bacteria, UA NONE SEEN FEW/NONE SEEN    Casts 8-15 HYALINE NONE SEEN /lpf    Crystals NONE SEEN NONE SEEN    Renal Epithelial, UA 3-5 NONE SEEN    Yeast, UA NONE SEEN NONE SEEN    Casts 8-15 C. GRAN /lpf    Miscellaneous Lab Test Result NONE SEEN    Lactic acid, plasma    Collection Time: 04/05/21  4:47 AM   Result Value Ref Range    Lactic Acid 1.0 0.5 - 2.2 mmol/L   CBC    Collection Time: 04/05/21  4:47 AM   Result Value Ref Range    WBC 6.7 4.8 - 10.8 thou/mm3    RBC 3.95 (L) 4.70 - 6.10 mill/mm3    Hemoglobin 12.8 (L) 14.0 - 18.0 gm/dl    Hematocrit 39.4 (L) 42.0 - 52.0 %    MCV 99.7 (H) 80.0 - 94.0 fL    MCH 32.4 26.0 - 33.0 pg    MCHC 32.5 32.2 - 35.5 gm/dl    RDW-CV 12.2 11.5 - 14.5 %    RDW-SD 44.7 35.0 - 45.0 fL    Platelets 392 390 - 286 thou/mm3    MPV 9.0 (L) 9.4 - 12.4 fL   Lipid panel    Collection Time: 04/05/21  4:47 AM   Result Value Ref Range    Cholesterol, Total 161 100 - 199 mg/dL    Triglycerides 156 0 - 199 mg/dL    HDL 48 mg/dL    LDL Calculated 82 mg/dL   CK    Collection Time: 04/05/21  4:47 AM   Result Value Ref Range    Total CK 1,243 (H) 55 - 170 U/L   Basic Metabolic Panel    Collection Time: 04/05/21  4:47 AM   Result Value Ref Range    Sodium 137 135 - 145 meq/L    Potassium 3.5 3.5 - 5.2 meq/L    Chloride 104 98 - 111 meq/L    CO2 23 23 - 33 meq/L    Glucose 95 70 - 108 mg/dL    BUN 18 7 - 22 mg/dL    CREATININE 1.4 (H) 0.4 - 1.2 mg/dL    Calcium 8.9 8.5 - 10.5 mg/dL   Lipase    Collection Time: 04/05/21  4:47 AM   Result Value Ref Range    Lipase 23.8 5.6 - 51.3 U/L   Troponin    Collection Time: 04/05/21  4:47 AM   Result Value Ref Range    Troponin T < 0.010 ng/ml   Anion Gap    Collection Time: 04/05/21  4:47 AM   Result Value Ref Range    Anion Gap 10.0 8.0 - 16.0 meq/L   Glomerular Filtration Rate, Estimated    Collection Time: 04/05/21  4:47 AM   Result Value Ref Range    Est, Glom Filt Rate 63 (A) ml/min/1.73m2   Iron    Collection Time: 04/05/21 10:36 AM   Result Value Ref Range    Iron 107 65 - 195 ug/dL   Iron binding capacity    Collection Time: 04/05/21 10:36 AM   Result Value Ref Range    TIBC 225 171 - 450 ug/dL   Ferritin    Collection Time: 04/05/21 10:36 AM   Result Value Ref Range    Ferritin 363 (H) 22 - 322 ng/mL   IRON SATURATION    Collection Time: 04/05/21 10:36 AM   Result Value Ref Range    Iron Saturation 48 20 - 50 %   Vitamin B12 & folate    Collection Time: 04/05/21 10:36 AM   Result Value Ref Range Vitamin B-12 298 211 - 911 pg/mL    Folate 18.0 4.8 - 24.2 ng/mL   Hemoglobin and hematocrit, blood    Collection Time: 04/05/21  6:27 PM   Result Value Ref Range    Hemoglobin 12.4 (L) 14.0 - 18.0 gm/dl    Hematocrit 38.3 (L) 42.0 - 52.0 %   Basic Metabolic Panel w/ Reflex to MG    Collection Time: 04/06/21  7:23 AM   Result Value Ref Range    Sodium 138 135 - 145 meq/L    Potassium reflex Magnesium 4.0 3.5 - 5.2 meq/L    Chloride 106 98 - 111 meq/L    CO2 24 23 - 33 meq/L    Glucose 94 70 - 108 mg/dL    BUN 12 7 - 22 mg/dL    CREATININE 1.0 0.4 - 1.2 mg/dL    Calcium 8.7 8.5 - 10.5 mg/dL   CBC    Collection Time: 04/06/21  7:23 AM   Result Value Ref Range    WBC 5.3 4.8 - 10.8 thou/mm3    RBC 3.76 (L) 4.70 - 6.10 mill/mm3    Hemoglobin 12.2 (L) 14.0 - 18.0 gm/dl    Hematocrit 38.4 (L) 42.0 - 52.0 %    .1 (H) 80.0 - 94.0 fL    MCH 32.4 26.0 - 33.0 pg    MCHC 31.8 (L) 32.2 - 35.5 gm/dl    RDW-CV 12.3 11.5 - 14.5 %    RDW-SD 46.0 (H) 35.0 - 45.0 fL    Platelets 612 835 - 886 thou/mm3    MPV 9.7 9.4 - 12.4 fL   Anion Gap    Collection Time: 04/06/21  7:23 AM   Result Value Ref Range    Anion Gap 8.0 8.0 - 16.0 meq/L   Glomerular Filtration Rate, Estimated    Collection Time: 04/06/21  7:23 AM   Result Value Ref Range    Est, Glom Filt Rate >90 ml/min/1.73m2   Blood occult stool screen #1    Collection Time: 04/06/21 11:29 AM   Result Value Ref Range    OCCULT BLOOD FECAL Negative       Urinalysis:      Lab Results   Component Value Date    NITRU NEGATIVE 04/04/2021    WBCUA 0-2 04/04/2021    WBCUA 0-2 05/02/2012    BACTERIA NONE SEEN 04/04/2021    RBCUA 0-2 04/04/2021    BLOODU MODERATE 04/04/2021    SPECGRAV 1.023 04/04/2021    GLUCOSEU NEGATIVE 07/28/2018       Radiology:-  Xr Chest Portable    Result Date: 4/4/2021  PROCEDURE: XR CHEST PORTABLE CLINICAL INFORMATION: chest pain, drug use. COMPARISON: Chest x-ray dated 5 August 2018.  TECHNIQUE: AP upright view of the chest. FINDINGS: There is borderline cardiomegaly. The mediastinum is not widened. There is slightly increased density at the right lung base, unchanged since previous study. There are no new effusions. . The pulmonary vascularity is normal. No suspicious osseous lesions are present. No interval change since previous study dated 5 August 2018. Justice Cunha **This report has been created using voice recognition software. It may contain minor errors which are inherent in voice recognition technology. ** Final report electronically signed by DR Dante Arshad on 4/4/2021 4:34 PM       Follow-up scheduled after discharge :-    in the next few days with No primary care provider on file.     Consultations during this hospital stay:-  [] NONE [] Cardiology  [] Nephrology  [] Hemo onco   [] GI   [] ID  [] Endocrine  [] Pulm    [] Neuro    [x] Psych   [] Urology  [] ENT   [] G SURGERY   []Ortho    []CV surg    [] Palliative  [] Hospice [] Pain management   []    []TCU   [] PT/OT  OTHERS:-    Disposition: Psych  Condition at Discharge: Stable    Time Spent:- 45 minutes    Electronically signed by ALDEN Luu on 4/6/2021 at 2:10 PM  Discharging Hospitalist

## 2021-04-07 LAB
ERYTHROCYTE [DISTWIDTH] IN BLOOD BY AUTOMATED COUNT: 12.3 % (ref 11.5–14.5)
ERYTHROCYTE [DISTWIDTH] IN BLOOD BY AUTOMATED COUNT: 46.1 FL (ref 35–45)
HCT VFR BLD CALC: 39.6 % (ref 42–52)
HEMOGLOBIN: 12.6 GM/DL (ref 14–18)
MCH RBC QN AUTO: 32.6 PG (ref 26–33)
MCHC RBC AUTO-ENTMCNC: 31.8 GM/DL (ref 32.2–35.5)
MCV RBC AUTO: 102.6 FL (ref 80–94)
PLATELET # BLD: 219 THOU/MM3 (ref 130–400)
PMV BLD AUTO: 9.4 FL (ref 9.4–12.4)
RBC # BLD: 3.86 MILL/MM3 (ref 4.7–6.1)
WBC # BLD: 5.5 THOU/MM3 (ref 4.8–10.8)

## 2021-04-07 PROCEDURE — 85027 COMPLETE CBC AUTOMATED: CPT

## 2021-04-07 PROCEDURE — 6370000000 HC RX 637 (ALT 250 FOR IP): Performed by: PHYSICIAN ASSISTANT

## 2021-04-07 PROCEDURE — 36415 COLL VENOUS BLD VENIPUNCTURE: CPT

## 2021-04-07 PROCEDURE — 6370000000 HC RX 637 (ALT 250 FOR IP): Performed by: PSYCHIATRY & NEUROLOGY

## 2021-04-07 PROCEDURE — 99223 1ST HOSP IP/OBS HIGH 75: CPT | Performed by: PSYCHIATRY & NEUROLOGY

## 2021-04-07 PROCEDURE — 1240000000 HC EMOTIONAL WELLNESS R&B

## 2021-04-07 RX ADMIN — PANTOPRAZOLE SODIUM 40 MG: 40 TABLET, DELAYED RELEASE ORAL at 07:41

## 2021-04-07 RX ADMIN — TRAZODONE HYDROCHLORIDE 50 MG: 50 TABLET ORAL at 21:13

## 2021-04-07 RX ADMIN — AMLODIPINE BESYLATE 10 MG: 10 TABLET ORAL at 11:09

## 2021-04-07 RX ADMIN — ASPIRIN 81 MG: 81 TABLET, CHEWABLE ORAL at 11:09

## 2021-04-07 RX ADMIN — HYDROXYZINE HYDROCHLORIDE 50 MG: 25 TABLET ORAL at 07:41

## 2021-04-07 RX ADMIN — IBUPROFEN 400 MG: 400 TABLET, FILM COATED ORAL at 19:59

## 2021-04-07 RX ADMIN — ACETAMINOPHEN 650 MG: 325 TABLET ORAL at 07:41

## 2021-04-07 RX ADMIN — IBUPROFEN 400 MG: 400 TABLET, FILM COATED ORAL at 06:50

## 2021-04-07 ASSESSMENT — PAIN DESCRIPTION - PROGRESSION
CLINICAL_PROGRESSION: NOT CHANGED
CLINICAL_PROGRESSION: NOT CHANGED

## 2021-04-07 ASSESSMENT — PAIN DESCRIPTION - DESCRIPTORS
DESCRIPTORS: ACHING
DESCRIPTORS: ACHING;DISCOMFORT

## 2021-04-07 ASSESSMENT — PAIN SCALES - GENERAL
PAINLEVEL_OUTOF10: 0
PAINLEVEL_OUTOF10: 7
PAINLEVEL_OUTOF10: 7

## 2021-04-07 ASSESSMENT — PAIN - FUNCTIONAL ASSESSMENT: PAIN_FUNCTIONAL_ASSESSMENT: PREVENTS OR INTERFERES SOME ACTIVE ACTIVITIES AND ADLS

## 2021-04-07 ASSESSMENT — PAIN DESCRIPTION - PAIN TYPE: TYPE: CHRONIC PAIN

## 2021-04-07 ASSESSMENT — PAIN DESCRIPTION - ONSET
ONSET: ON-GOING
ONSET: ON-GOING

## 2021-04-07 ASSESSMENT — PAIN DESCRIPTION - FREQUENCY
FREQUENCY: CONTINUOUS
FREQUENCY: CONTINUOUS

## 2021-04-07 ASSESSMENT — PAIN DESCRIPTION - LOCATION: LOCATION: BACK

## 2021-04-07 NOTE — BH NOTE
INPATIENT RECREATIONAL THERAPY  ADULT BEHAVIORAL SERVICES  EVALUATION    REFERRING PHYSICIAN:    Dr. Indy Mckeon  DIAGNOSIS:   Major Depressive Disorder, Recurrent  PRECAUTIONS:    Standard precautions    HISTORY OF PRESENT ILLNESS/INJURY:   Patient was admitted to the unit due to suicidal ideation, alcohol withdrawal symptoms and depression. Patient stated that he was having thoughts of cutting his throat prior to admission. Patient reported that he was having chest pain and visual and auditory hallucinations. Patient stated that he has been binging on alcohol and cocaine. Patient was cooperative and pleasant at time of evaluation. PMH:  Please see medical chart for prior medical history, allergies, and medication    HISTORY OF PSYCHIATRIC TREATMENT:  IP:  [facility]  OP: Ashvin Stacks OF BIRTH:   [birth year]  GENDER:   male  MARITAL STATUS:  Legally  and going through a divorce. EMPLOYMENT STATUS:  unemployed    LIVING SITUATION/SUPPORT:  Lives with friends. Patient stated that his sister is his support. EDUCATIONAL LEVEL:       MEDICATION/DRUG USE:  Alcohol abuse. Marijuana use. Noncompliance with medications. Cocaine abuse. LEISURE INTERESTS:  watching TV and movies, family activities, activities with friends, spiritual activities, listening to music  ACTIVITY PREFERENCE:  individual  ACTIVITY TYPES:  Passive. Indoor. Outdoor. Active. COGNITION:   A&Ox4    COPING:  poor  ATTENTION:  fair  RELAXATION:  Reported anxiety and poor sleep. SELF-ESTEEM:    poor  MOTIVATION:   fair    SOCIAL SKILLS:   good  FRUSTRATION TOLERANCE:   No history of violence noted in chart. ATTENTION SEEKING:  None noted  COOPERATION:  Cooperative and pleasant  AFFECT:   blunt  APPEARANCE:  appropriate    HEARING:   No problems  VISION:  No problems  VERBAL COMMUNICATION:  No problems  WRITTEN COMMUNICATION:   No problems noted    COORDINATION:  Patient has a history of chest pain, asthma and arthritis. MOBILITY:  Ambulates independently    GOALS:   Identify 2 new positive coping skills by time of evaluation.

## 2021-04-07 NOTE — PATIENT CARE CONFERENCE
system? Sister- Lives in [place]  3. Do you have follow-up providers? Dr. Joceline Mtz. Patient has not seen  In a oer 2 years  4. Do you have the ability to pay for your medications? Pending Medicaid  5. Where will you be residing when you leave the hospital? Patient is unsure. 6. Will need a return to work slip or FMLA paper completion?  No      GOALS UPDATE:   Time frame for Short-Term Goals: Daily    Marquita Rose

## 2021-04-07 NOTE — PROGRESS NOTES
Topic benefits and purpose of 12 step fellowship with AA/NA/EA    Start [address] [facility]    Participation pt did not attend

## 2021-04-07 NOTE — PLAN OF CARE
Problem: Altered Mood, Depressive Behavior:  Goal: Able to verbalize acceptance of life and situations over which he or she has no control  Description: Able to verbalize acceptance of life and situations over which he or she has no control  4/7/2021 1118 by Guillaume Roque RN  Outcome: Ongoing  Note: Continues to work towards accepting life situations over which he has no control. Pt shared struggling the last 4-5 months with his depression  4/6/2021 2156 by David Ferguson RN  Outcome: Ongoing  Goal: Able to verbalize and/or display a decrease in depressive symptoms  Description: Able to verbalize and/or display a decrease in depressive symptoms  4/7/2021 1118 by Guillaume Roque RN  Outcome: Ongoing  Note: Pt reports his mood as a 4 he is compliant with his medications reports feeling down, no energy with poor concentration   4/6/2021 2156 by David Ferguson RN  Outcome: Ongoing  Note: Patient states he continues to feel depressed this shift. Patient noted with a blunt affect and brightens slightly with interaction. Goal: Ability to disclose and discuss suicidal ideas will improve  Description: Ability to disclose and discuss suicidal ideas will improve  4/7/2021 1118 by Guillaume Roque RN  Outcome: Ongoing  4/6/2021 2156 by David Ferguson RN  Outcome: Ongoing  Note: Patient denies any suicidal thoughts so far this shift. Goal: Able to verbalize support systems  Description: Able to verbalize support systems  4/7/2021 1118 by Guillaume Roque RN  Outcome: Ongoing  Note: Pt reports his sister is his support  4/6/2021 2156 by David Ferguson RN  Outcome: Ongoing  Note: Patient states his sister is his current support system.   Goal: Absence of self-harm  Description: Absence of self-harm  4/7/2021 1118 by Guillaume Roque RN  Outcome: Ongoing  Note: Remains free of self harming behaviors  4/6/2021 2156 by David Ferguson RN  Outcome: Ongoing  Note: No self harm noted so far this shift. Goal: Patient specific goal  Description: Patient specific goal  4/7/2021 1118 by Patel Barkley RN  Outcome: Ongoing  Note: \"To be a better person\" support provided   4/6/2021 2156 by Sara Mirza RN  Outcome: Ongoing  Note: Patient did not state a goal this shift. Goal: Participates in care planning  Description: Participates in care planning  4/7/2021 1118 by Patel Barkley RN  Outcome: Ongoing  4/6/2021 2156 by Sara Mirza RN  Outcome: Ongoing  Note: Care plan reviewed with patient and fair understanding verbalized. Problem: Discharge Planning:  Goal: Discharged to appropriate level of care  Description: Discharged to appropriate level of care  4/7/2021 1118 by Patel Barkley RN  Outcome: Ongoing  Note: Pt working with treatment team for optimal discharge needs. We discussed the importance of maintaining total abstinence while in recovery. Education provided on benefits and purpose of attending 12 step meetings-pt shared past involvement with 12 step meetings  4/6/2021 2156 by Sara Mirza RN  Outcome: Ongoing  Note: Patient states he is unsure of his discharge plan. Problem: Activity:  Goal: Sleeping patterns will improve  Description: Sleeping patterns will improve  4/7/2021 1118 by Patel Barkley RN  Outcome: Ongoing  Note: Pt slept 8 hours continuous sleep compliant with hs medications  4/6/2021 2156 by Sara Mirza RN  Outcome: Ongoing  Note: Patient states his sleep pattern remains poor. Problem: Pain:  Goal: Pain level will decrease  Description: Pain level will decrease  4/7/2021 1118 by Patel Barkley RN  Outcome: Ongoing  Note: Pt has chronic pain issues reports prn Motrin/Tylenol is helpful with his lower chronic back pain issues  4/6/2021 2156 by Sara Mirza RN  Outcome: Ongoing  Note: Patient states a current pain level of an 8.      Problem: Coping:  Goal: Ability to identify problematic behaviors that deter socialization will improve  Description: Ability to identify problematic behaviors that deter socialization will improve  4/7/2021 1115 by Jerry Griggs  Outcome: Not Met This Shift   Care plan reviewed with patient.   Patient does verbalize understanding of the plan of care and does contribute to goal setting

## 2021-04-07 NOTE — PLAN OF CARE
Patient has not attended and/or participated in any of the groups so far today but has been out of his room to watch a little TV this morning. Patient will be encouraged to attend all groups on the unit daily and to come out of his room for socialization with others during his hospital stay.

## 2021-04-07 NOTE — H&P
4 to 5 years. Dm Livingston reports his depression started after him and his wife . Dm Livingston states around that time, he lost a couple of kids back to back. Dm Livingston was previously seen [facility] but stopped seeing them as he moved out of [facility] Ran returned about a month ago but missed his appointment with [facility] in February due to increment weather. Dm Livingston reports he has not had an appointment at [facility] for over 2 years. Dm Livingston was previously seeing Dr. Suresh Delgado reports this recent episode of depression started in December because he was not feeling good and had been off his psychiatric medications. Dm Livingston endorses feeling down and sad for more days and not since. Dm Livingston reports he only sleeps 3 to 4 hours a night. Dm Livingston has trouble staying asleep. Dm Livingston states he just wakes up out of the Ruth Galeazzi still feels tired most days when he wakes up in the morning. Dm Kiddtungmaribel states his energy has not been good throughout the day.  Motivation has been poor. Dm Livingston states he takes him a while to get going for the day.  His appetite has been increased. Dm Livingston denies any significant weight gain.  He endorses anhedonia. Dm Livingston states he can even finish watching a movie and has to change the channel before it ends. Dm Livingston has been feeling worthless, hopeless and helpless. He states he has a history of bipolar disorder. Dm Livingston reports when he is manic, he is \"rage, really mad. \"  He reports he has gone days without sleep when sober. Dm Livingston states when he cannot sleep, he watches TV.  Denies any increase in productivity, impulsitivity, increased energy, grandiosity during this time.  He states these episodes of rage last 3 to 4 days.    He states he has a history of cutting both of his wrists in 2013. Dm Livingston reports he was subsequently admitted to [facility] behavioral unit following his suicide attempt. Dm Livingston was most recently admitted to [facility] in July 2018 for substance-induced mood disorder.  He smokes marijuana daily.  Uses cocaine 3 times a month.  Has been using this amount for over 10 years. [facility] reports his longest period of sobriety the past 10 years was 2 years.  He drinks alcohol 4-5 times a week. [facility] states when he does drink, he drinks 6 to 7 24 ounce cans of beer. [facility] does get the shakes when he stops drinking.  Has a history of withdrawal from alcohol.  He reports he has been through inpatient detoxification and rehabilitation in the past. [facility] states he was admitted to  for alcohol rehabilitation in the past.  UDS positive for cocaine only. \"  Update from today  Patient continues to report that he has been feeling sad down and low. Rates his mood 4 out of 10. Continues to have active suicidal thoughts and a plan to kill self. Contract to safety here. Reports feeling tired today. Reports poor energy and concentration. Has trouble falling asleep and staying asleep. Reports good appetite. Patient reports he has plans to go live with his sister in [place] after getting discharged from here. Identifies drug use and his living situation as a primary stressor. PSYCHIATRIC HISTORY:   · Outpatient psychiatric provider: No one currently.  Previously followed with [facility]. Angelica Ma at [facility] in the past  · Suicide attempts: 1 in 2013 by cutting bilateral wrists  · Inpatient psychiatric admissions: Multiple to [facility] behavioral unit. Most recently in July 2018.  Prior to that, 09/2017, 07/2015, 06/2015, 07/2014, 07/2012, 11/2011     Past psychiatric medications includes:      Latuda  Celexa  Hydroxyzine  \"a whole bunch\"      Trazodone, Abilify per medical record     adverse reactions from psychotropic medications:     no        Lifetime Psychiatric Review of Systems   ·    Obsessions and Compulsions: Denies  ·    Sulma or Hypomania: irritability, anger for 3-4 days at a time  ·    Hallucinations: yes when high  ·    Panic Attacks:  Denies  ·    Delusions: General Roque  ·    Phobias:  Denies  ·    Trauma: Denies    Past Medical History:        Diagnosis Date    Anxiety     Arthritis     Asthma     Chronic back pain 2004    L5-S1 dislocation w/subsequent surgery    Class 1 obesity due to excess calories without serious comorbidity with body mass index (BMI) of 32.0 to 32.9 in adult 4/5/2021    Depression     Hypertension     Microscopic hematuria     Movement disorder     Pulmonary tuberculosis     exposure from a cousin at the age of 10-11; treated. Past Surgical History:        Procedure Laterality Date    BACK SURGERY  2004    low back    LUNG BIOPSY  2012       Allergies:  Patient has no known allergies. Social History:     RESIDENCE: She was born and [place] [address] was raised by his grandmother and mother. [facility] has a brother and 2 half-sisters. [facility] [place] to [place] in [facility] [address] currently lives in [place] alone  : . Courtney Britt previously  for [address] to [address] for his back since 2017. Dayana Peters to that he worked at [phone] has had multiple drug charges in the past.  Served 2 years in assisted from [id number] for possession of controlled substance.     DRUG USE HISTORY  Social History     Tobacco Use   Smoking Status Current Every Day Smoker    Packs/day: 0.50    Years: 25.00    Pack years: 12.50    Types: Cigarettes   Smokeless Tobacco Never Used     Social History     Substance and Sexual Activity   Alcohol Use Yes    Alcohol/week: 6.0 standard drinks    Types: 6 Cans of beer per week    Comment: Weekends - 6 pack 1 day per week     Social History     Substance and Sexual Activity   Drug Use Yes    Types: Marijuana    Comment: cocaine use in the past         Family History:       Problem Relation Age of Onset    Diabetes Mother     Diabetes Father     Cancer Maternal Grandmother         cervical       Psychiatric Family History  Denies any significant family psychiatric history  Grandfather, father and brother-alcoholism    PHYSICAL EXAM:  Vitals:  /80   Pulse 70   Temp 97 °F (36.1 °C) (Tympanic)   Resp 16   Ht 5' 10\" (1.778 m)   Wt 232 lb (105.2 kg)   SpO2 100%   BMI 33.29 kg/m²      Review of Systems   Constitutional: Negative for chills and weight loss. HENT: Negative for ear pain and nosebleeds. Eyes: Negative for blurred vision and photophobia. Respiratory: Negative for cough, shortness of breath and wheezing. Cardiovascular: Negative for chest pain and palpitations. Gastrointestinal: Negative for abdominal pain, diarrhea and vomiting. Genitourinary: Negative for dysuria and urgency. Musculoskeletal: Negative for falls and joint pain. Skin: Negative for itching and rash. Neurological: Negative for tremors, seizures and weakness. Endo/Heme/Allergies: Does not bruise/bleed easily. Physical Exam:      Constitutional:  Appears well-developed and well-nourished, no acute distress  HENT:   Head: Normocephalic and atraumatic. Eyes: Conjunctivae are normal. Right eye exhibits no discharge. Left eye exhibits no discharge. No scleral icterus. Neck: Normal range of motion. Neck supple. Pulmonary/Chest:  No respiratory distress or accessory muscle use, no wheezing. Abdominal: Soft. Exhibits no distension. Musculoskeletal: Normal range of motion. Exhibits no edema. Neurological: cranial nerves II-XII grossly in tact, normal gait and station  Skin: Skin is warm and dry. Patient is not diaphoretic. No erythema.          Mental Status Examination:    Level of consciousness:  within normal limits   Appearance:  Hospital attire, seated on the side of bed, fair grooming   Behavior/Motor: no abnormalities noted  Attitude toward examiner:  Cooperative  Speech: normal rate and volume  Mood:  Depressed  Affect:  blunted  Thought processes:  Goal directed, linear  Thought content: active suicidal ideations without current plan or intent               denies homicidal ideations Denies hallucinations              denies delusions  Cognition:  Oriented to self, location, time, situation  Concentration clinically adequate  Memory: intact  Insight &Judgment: poor    DSM-5 Diagnosis  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  Rule out substance-induced mood disorder, bipolar disorder  Alcohol use disorder, severe with dependence  Polysubstance abuse-cocaine, cannabis    Psychosocial and Contextual factors:  Financial  Occupational  Relationship  Legal   Living situation  Educational     Past Medical History:   Diagnosis Date    Anxiety     Arthritis     Asthma     Chronic back pain 2004    L5-S1 dislocation w/subsequent surgery    Class 1 obesity due to excess calories without serious comorbidity with body mass index (BMI) of 32.0 to 32.9 in adult 4/5/2021    Depression     Hypertension     Microscopic hematuria     Movement disorder     Pulmonary tuberculosis     exposure from a cousin at the age of 10-11; treated. TREATMENT PLAN    Risk Management:  close watch per standard protocol      Psychotherapy:  participation in milieu and group and individual sessions with Attending Physician,  and Physician Assistant/CNP      Estimated length of stay: It might take more than 2 midnights to stabilize patient's mood/thoughts and titrate medications to effect. GENERAL PATIENT/FAMILY EDUCATION  Patient will understand basic signs and symptoms, Patient will understand benefits/risks and potential side effects from proposed meds and Patient will understand their role in recovery. Family is  active in patient's care. Patient assets that may be helpful during treatment include: Intent to participate and engage in treatment, sufficient fund of knowledge and intellect to understand and utilize treatments. Risk level: High     Goals:    Reviewed labs  Reviewed EKG  Will obtain records and review them today.   Medication adjustment: Will add mirtazapine at bedtime to help with his mood. Consults: none        Behavioral Services  Medicare Certification     Admission Day 1  I certify that this patient's inpatient psychiatric hospital admission is medically necessary for:    x (1) treatment which could reasonably be expected to improve this patient's condition, or    x (2) diagnostic study or its equivalent. Physicians Signature:  Electronically signed by Sheeba Rae MD on 4/7/21 at 12:47 PM EDT      Neil West is a 61 y.o. male being evaluated by a Virtual Visit (video visit) encounter to address concerns as mentioned above. A caregiver was present in the room along with the patient. Pursuant to the emergency declaration under the [address] [place] [address], [address] [place] [address] authority and the [employer] and [employer] Act, this Virtual Visit was conducted with patient's (and/or legal guardian's) consent, to reduce the patient's risk of exposure to COVID-19 and provide necessary medical care. Services were provided through a video synchronous discussion virtually to substitute for in-person visit by provider. Patient is present at [address] [place] [address], [facility] and I am physically present at [facility], [address]    --Sheeba Rae MD on 4/7/2021 at 12:57 PM    An electronic signature was used to authenticate this note. **This report has been created using voice recognition software. It may contain minor errors which are inherent in voice recognition technology. **

## 2021-04-07 NOTE — BH NOTE
Patient did not attend and/or participate in the goal group or the recreation group this AM due to patient sleeping.

## 2021-04-07 NOTE — PLAN OF CARE
Problem: Suicide risk  Goal: Provide patient with safe environment  Description: Provide patient with safe environment  Outcome: Ongoing  Note: Patient continues in a safe environment. 15 minute visual checks continued. Problem: Altered Mood, Depressive Behavior:  Goal: Able to verbalize acceptance of life and situations over which he or she has no control  Description: Able to verbalize acceptance of life and situations over which he or she has no control  Outcome: Ongoing  Goal: Able to verbalize and/or display a decrease in depressive symptoms  Description: Able to verbalize and/or display a decrease in depressive symptoms  Outcome: Ongoing  Note: Patient states he continues to feel depressed this shift. Patient noted with a blunt affect and brightens slightly with interaction. Goal: Ability to disclose and discuss suicidal ideas will improve  Description: Ability to disclose and discuss suicidal ideas will improve  Outcome: Ongoing  Note: Patient denies any suicidal thoughts so far this shift. Goal: Able to verbalize support systems  Description: Able to verbalize support systems  Outcome: Ongoing  Note: Patient states his sister is his current support system. Goal: Absence of self-harm  Description: Absence of self-harm  Outcome: Ongoing  Note: No self harm noted so far this shift. Goal: Patient specific goal  Description: Patient specific goal  Outcome: Ongoing  Note: Patient did not state a goal this shift. Goal: Participates in care planning  Description: Participates in care planning  Outcome: Ongoing  Note: Care plan reviewed with patient and fair understanding verbalized. Problem: Tobacco Use:  Goal: Inpatient tobacco use cessation counseling participation  Description: Inpatient tobacco use cessation counseling participation  Outcome: Ongoing  Note: Ongoing.      Problem: Discharge Planning:  Goal: Discharged to appropriate level of care  Description: Discharged to appropriate level of care Outcome: Ongoing  Note: Patient states he is unsure of his discharge plan. Problem: Activity:  Goal: Physical symptoms of sleep deprivation will improve  Description: Physical symptoms of sleep deprivation will improve  Outcome: Ongoing  Note: Ongoing. Goal: Sleeping patterns will improve  Description: Sleeping patterns will improve  Outcome: Ongoing  Note: Patient states his sleep pattern remains poor. Problem: Pain:  Goal: Pain level will decrease  Description: Pain level will decrease  Outcome: Ongoing  Note: Patient states a current pain level of an 8. Goal: Control of acute pain  Description: Control of acute pain  Outcome: Ongoing  Note: Patient denies any acute pain this shift. Goal: Control of chronic pain  Description: Control of chronic pain  Outcome: Ongoing  Note: Patient states he continues with chronic low back aching. Care plan reviewed with patient.   Patient does verbalize understanding of the plan of care and does contribute to goal setting

## 2021-04-07 NOTE — PROGRESS NOTES
Behavioral Services  Medicare Certification Upon Admission    I certify that this patient's inpatient psychiatric hospital admission is medically necessary for:    [x] (1) Treatment which could reasonably be expected to improve this patient's condition,       [x] (2) Or for diagnostic study;     AND     [x](2) The inpatient psychiatric services are provided while the individual is under the care of a physician and are included in the individualized plan of care.     Estimated length of stay/service 3-5 days    [place] [facility]    Electronically signed by Lovella Cogan, MD on 4/7/2021 at 12:57 PM

## 2021-04-08 LAB — SARS-COV-2, NAAT: NOT DETECTED

## 2021-04-08 PROCEDURE — 99232 SBSQ HOSP IP/OBS MODERATE 35: CPT | Performed by: PSYCHIATRY & NEUROLOGY

## 2021-04-08 PROCEDURE — 90833 PSYTX W PT W E/M 30 MIN: CPT | Performed by: PSYCHIATRY & NEUROLOGY

## 2021-04-08 PROCEDURE — 1240000000 HC EMOTIONAL WELLNESS R&B

## 2021-04-08 PROCEDURE — 6370000000 HC RX 637 (ALT 250 FOR IP): Performed by: PHYSICIAN ASSISTANT

## 2021-04-08 PROCEDURE — 87635 SARS-COV-2 COVID-19 AMP PRB: CPT

## 2021-04-08 PROCEDURE — 6370000000 HC RX 637 (ALT 250 FOR IP): Performed by: PSYCHIATRY & NEUROLOGY

## 2021-04-08 RX ADMIN — TRAZODONE HYDROCHLORIDE 50 MG: 50 TABLET ORAL at 21:36

## 2021-04-08 RX ADMIN — PHENOL 1 SPRAY: 1.5 LIQUID ORAL at 17:26

## 2021-04-08 RX ADMIN — ACETAMINOPHEN 650 MG: 325 TABLET ORAL at 16:39

## 2021-04-08 RX ADMIN — HYDROXYZINE HYDROCHLORIDE 50 MG: 25 TABLET ORAL at 21:36

## 2021-04-08 RX ADMIN — AMLODIPINE BESYLATE 10 MG: 10 TABLET ORAL at 07:57

## 2021-04-08 RX ADMIN — PANTOPRAZOLE SODIUM 40 MG: 40 TABLET, DELAYED RELEASE ORAL at 07:58

## 2021-04-08 RX ADMIN — IBUPROFEN 400 MG: 400 TABLET, FILM COATED ORAL at 07:55

## 2021-04-08 RX ADMIN — ASPIRIN 81 MG: 81 TABLET, CHEWABLE ORAL at 07:55

## 2021-04-08 RX ADMIN — SERTRALINE 50 MG: 50 TABLET, FILM COATED ORAL at 10:05

## 2021-04-08 ASSESSMENT — PAIN DESCRIPTION - PAIN TYPE: TYPE: CHRONIC PAIN

## 2021-04-08 ASSESSMENT — PAIN SCALES - GENERAL
PAINLEVEL_OUTOF10: 8
PAINLEVEL_OUTOF10: 8

## 2021-04-08 NOTE — PLAN OF CARE
Problem: Altered Mood, Depressive Behavior:  Goal: Able to verbalize acceptance of life and situations over which he or she has no control  Description: Able to verbalize acceptance of life and situations over which he or she has no control  4/7/2021 1118 by Jim Wolff RN  Outcome: Ongoing  Note: Continues to work towards accepting life situations over which he has no control. Pt shared struggling the last 4-5 months with his depression  Goal: Able to verbalize and/or display a decrease in depressive symptoms  Description: Able to verbalize and/or display a decrease in depressive symptoms  4/7/2021 1118 by Jim Wolff RN  Outcome: Ongoing  Note: Pt reports his mood as a 4 he is compliant with his medications reports feeling down, no energy with poor concentration   Goal: Ability to disclose and discuss suicidal ideas will improve  Description: Ability to disclose and discuss suicidal ideas will improve  4/7/2021 1118 by Jim Wolff RN  Outcome: Ongoing  Goal: Able to verbalize support systems  Description: Able to verbalize support systems  4/7/2021 1118 by Jim Wolff RN  Outcome: Ongoing  Note: Pt reports his sister is his support  Goal: Absence of self-harm  Description: Absence of self-harm  4/7/2021 1118 by Jim Wolff RN  Outcome: Ongoing  Note: Remains free of self harming behaviors  Goal: Patient specific goal  Description: Patient specific goal  4/7/2021 1118 by Jim Wolff RN  Outcome: Ongoing  Note:  \"To be a better person\" support provided   Goal: Participates in care planning  Description: Participates in care planning  4/7/2021 1118 by Jim Wolff RN  Outcome: Ongoing     Problem: Altered Mood, Depressive Behavior:  Goal: Able to verbalize acceptance of life and situations over which he or she has no control  Description: Able to verbalize acceptance of life and situations over which he or she has no control 4/7/2021 2117 by Payam Bran RN  Outcome: Ongoing  Note: Patient verbalizes that he is working toward acceptance of life and the situations he has no control over. 4/7/2021 1118 by Ezequiel Penaloza RN  Outcome: Ongoing  Note: Continues to work towards accepting life situations over which he has no control. Pt shared struggling the last 4-5 months with his depression  Goal: Able to verbalize and/or display a decrease in depressive symptoms  Description: Able to verbalize and/or display a decrease in depressive symptoms  4/7/2021 2117 by Payam Bran RN  Outcome: Ongoing  Note: Patient reports that he continues feel depressed and helpless . Patient reports he has hope for the future although has \"somewhat\" passive suicidal thoughts. Patient contract verbally for safety. Patient has been out watching TV with a male peer. 4/7/2021 1118 by Ezequiel Penaloza RN  Outcome: Ongoing  Note: Pt reports his mood as a 4 he is compliant with his medications reports feeling down, no energy with poor concentration   Goal: Ability to disclose and discuss suicidal ideas will improve  Description: Ability to disclose and discuss suicidal ideas will improve  4/7/2021 2117 by Payam Bran RN  Outcome: Ongoing  Note: Patient reports that he continues to have passive suicidal thoughts this shift. Patient contracts verbally for safety. 4/7/2021 1118 by Ezequiel Penaloza RN  Outcome: Ongoing  Goal: Able to verbalize support systems  Description: Able to verbalize support systems  4/7/2021 2117 by Payam Bran RN  Outcome: Completed  Note: Patient reports that his sister is supportive. 4/7/2021 1118 by Ezequiel Penaloza RN  Outcome: Ongoing  Note: Pt reports his sister is his support  Goal: Absence of self-harm  Description: Absence of self-harm  4/7/2021 2117 by Payam Bran RN  Outcome: Met This Shift  Note: Patient remains free from self-harm this shift.   4/7/2021 1118 by Efrain Reyes MERON López  Outcome: Ongoing  Note: Remains free of self harming behaviors  Goal: Patient specific goal  Description: Patient specific goal  4/7/2021 2117 by Romaine Estrada RN  Outcome: Met This Shift  Note: Patient set a goal to be a better person. 4/7/2021 1118 by Haja Fan RN  Outcome: Ongoing  Note: \"To be a better person\" support provided   Goal: Participates in care planning  Description: Participates in care planning  4/7/2021 2117 by Romaine Estrada RN  Outcome: Met This Shift  Note: Care plan reviewed with patient. Patient does verbalize understanding of the plan of care and does contribute to goal setting . 4/7/2021 1118 by Haja Fan RN  Outcome: Ongoing     Problem: Discharge Planning:  Goal: Discharged to appropriate level of care  Description: Discharged to appropriate level of care  4/7/2021 2117 by Romaine Estrada RN  Outcome: Ongoing  Note: Discharge planning is in process. 4/7/2021 1118 by Haja Fan RN  Outcome: Ongoing  Note: Pt working with treatment team for optimal discharge needs. We discussed the importance of maintaining total abstinence while in recovery. Education provided on benefits and purpose of attending 12 step meetings-pt shared past involvement with 12 step meetings     Problem: Activity:  Goal: Sleeping patterns will improve  Description: Sleeping patterns will improve  4/7/2021 2117 by Romaine Estrada RN  Outcome: Met This Shift  Note: Patient slept 8 hours last night per report. Patient given prn Trazodone to assist with sleep. 4/7/2021 1118 by Haja Fan RN  Outcome: Ongoing  Note: Pt slept 8 hours continuous sleep compliant with hs medications     Problem: Pain:  Description: Pain management should include both nonpharmacologic and pharmacologic interventions.   Goal: Pain level will decrease  Description: Pain level will decrease  4/7/2021 2117 by Romaine Estrada RN  Outcome: Ongoing  Note: Patient has complaints of lower back pain rating it at a # 7. PRN Motrin given for the patint's complaints of pain.   4/7/2021 1118 by Sheila Mccormick RN  Outcome: Ongoing  Note: Pt has chronic pain issues reports prn Motrin/Tylenol is helpful with his lower chronic back pain issues     Problem: Coping:  Goal: Ability to identify problematic behaviors that deter socialization will improve  Description: Ability to identify problematic behaviors that deter socialization will improve  4/7/2021 2117 by Shanna Osorio RN  Outcome: Ongoing  4/7/2021 1115 by Geraldine Ceballos  Outcome: Not Met This Shift

## 2021-04-08 NOTE — GROUP NOTE
Group Therapy Note    Date: 4/8/2021    Group Start Time: 1000  Group End Time: 3940  Group Topic: Psychotherapy    [facility] 4E    TAB Lundberg        Group Therapy Note    Attendees: 4         Patient's Goal:  Identify coping skills and situations in which boundaries can be utilized. Notes:  Patient contributed to group conversation in a relevant and positive manner. Encouraged peers and shared personal experiences. Status After Intervention:  Improved    Participation Level:  Active Listener and Interactive    Participation Quality: Appropriate, Attentive, Sharing and Supportive      Speech:  normal      Thought Process/Content: Logical      Affective Functioning: Congruent      Mood: euthymic      Level of consciousness:  Alert, Oriented x4 and Attentive      Response to Learning: Able to verbalize current knowledge/experience, Able to verbalize/acknowledge new learning, Able to retain information, Capable of insight, Able to change behavior and Progressing to goal      Endings: None Reported    Modes of Intervention: Education, Support, Socialization and Problem-solving      Discipline Responsible: /Counselor      Signature:  TAB Lundberg

## 2021-04-08 NOTE — PLAN OF CARE
Problem: Altered Mood, Depressive Behavior:  Goal: Able to verbalize acceptance of life and situations over which he or she has no control  Description: Able to verbalize acceptance of life and situations over which he or she has no control  4/8/2021 1057 by Wilber Salcedo RN  Outcome: Ongoing  Note: Patient verbalizes being frustrated that he could not get out of where he is currently living until he receives his next check. 4/7/2021 2117 by Ami Dee RN  Outcome: Ongoing  Note: Patient verbalizes that he is working toward acceptance of life and the situations he has no control over. Goal: Able to verbalize and/or display a decrease in depressive symptoms  Description: Able to verbalize and/or display a decrease in depressive symptoms  4/8/2021 1057 by Wilber Salcedo RN  Outcome: Ongoing  Note: Patient verbalizes continued feelings of depression this shift. 4/7/2021 2117 by Ami Dee RN  Outcome: Ongoing  Note: Patient reports that he continues feel depressed and helpless . Patient reports he has hope for the future although has \"somewhat\" passive suicidal thoughts. Patient contract verbally for safety. Patient has been out watching TV with a male peer. Goal: Ability to disclose and discuss suicidal ideas will improve  Description: Ability to disclose and discuss suicidal ideas will improve  4/8/2021 1057 by Wilber Salcedo RN  Outcome: Met This Shift  Note: Patient denies having suicidal ideation this shift. 4/7/2021 2117 by Ami Dee RN  Outcome: Ongoing  Note: Patient reports that he continues to have passive suicidal thoughts this shift. Patient contracts verbally for safety. Goal: Able to verbalize support systems  Description: Able to verbalize support systems  4/7/2021 2117 by Ami Dee RN  Outcome: Completed  Note: Patient reports that his sister is supportive.   Goal: Absence of self-harm  Description: Absence of self-harm  4/8/2021 1057 by Wilber Salcedo RN  Note: Patient remains free from self harm this shift. 4/7/2021 2117 by Tabatha Veliz RN  Outcome: Met This Shift  Note: Patient remains free from self-harm this shift. Goal: Patient specific goal  Description: Patient specific goal  4/8/2021 1057 by Iris De Anda RN  Outcome: Met This Shift  Note: Patient sets goal of \"staying positive\" with writer this shift. 4/7/2021 2117 by Tabatha Veliz RN  Outcome: Met This Shift  Note: Patient set a goal to be a better person. Goal: Participates in care planning  Description: Participates in care planning  4/8/2021 1057 by Iris De Anda RN  Outcome: Ongoing  Note: Patient actively participates in care planning with social work staff this shift. 4/7/2021 2117 by Tabatha Veliz RN  Outcome: Met This Shift  Note: Care plan reviewed with patient. Patient does verbalize understanding of the plan of care and does contribute to goal setting . Problem: Discharge Planning:  Goal: Discharged to appropriate level of care  Description: Discharged to appropriate level of care  4/8/2021 1057 by Iris De Anda RN  Outcome: Not Met This Shift  Note: Patient not discharged this shift. Patient continues to work toward discharge goal with care team.   4/7/2021 2117 by Tabatha Veliz RN  Outcome: Ongoing  Note: Discharge planning is in process. Problem: Activity:  Goal: Sleeping patterns will improve  Description: Sleeping patterns will improve  4/8/2021 1057 by Iris De Anda RN  Outcome: Met This Shift  Note: Patient's reported sleep is 8 hours continuous. 4/7/2021 2117 by Tabatha Veliz RN  Outcome: Met This Shift  Note: Patient slept 8 hours last night per report. Patient given prn Trazodone to assist with sleep. Problem: Pain:  Goal: Pain level will decrease  Description: Pain level will decrease  4/8/2021 1057 by Iris De Anda RN  Outcome: Ongoing  Note: Patient reports #8/10 back pain this shift. Patient taking tylenol and ibp prn for pain with relief. Taylor Webb

## 2021-04-08 NOTE — PATIENT CARE CONFERENCE
[facility]  Day 3 Interdisciplinary Treatment Plan NOTE    Review Date & Time: 04/08/21  1122    Patient was in treatment team    Admission Type:   Admission Type: Voluntary    Reason for admission:  Reason for Admission: thought about really killing myself  Estimated Length of Stay Update:  3-5 days  Estimated Discharge Date Update: 3-5 days    PATIENT STRENGTHS:  Patient Strengths Strengths: Communication, Connection to output provider, Positive Support  Patient Strengths and Limitations:Limitations: Tendency to isolate self  Addictive Behavior:Addictive Behavior  In the past 3 months, have you felt or has someone told you that you have a problem with:  : None  Do you have a history of Chemical Use?: No  Do you have a history of Alcohol Use?: Yes  Do you have a history of Street Drug Abuse?: Yes  Histroy of Prescripton Drug Abuse?: No  Medical Problems:  Past Medical History:   Diagnosis Date    Anxiety     Arthritis     Asthma     Chronic back pain 2004    L5-S1 dislocation w/subsequent surgery    Class 1 obesity due to excess calories without serious comorbidity with body mass index (BMI) of 32.0 to 32.9 in adult 4/5/2021    Depression     Hypertension     Microscopic hematuria     Movement disorder     Pulmonary tuberculosis     exposure from a cousin at the age of 10-11; treated.        Risk:  Fall RiskTotal: 67  Bill Scale Bill Scale Score: 21  BVC Total: 0  Change in scores No. Changes to plan of Care  No    Status EXAM:   Status and Exam  Normal: No  Facial Expression: Brightened  Affect: Appropriate  Level of Consciousness: Alert  Mood:Normal: No  Mood: Depressed, Anxious  Motor Activity:Normal: No  Motor Activity: Decreased  Interview Behavior: Cooperative  Preception: Br[place]io to Person, Tracee Naegeli to Time, Br[place] to Place, [place]io to Situation  Attention:Normal: Yes  Thought Processes: Circumstantial  Thought Content:Normal: Yes  Hallucinations: None  Delusions: No  Memory:Normal: with numbers for sober living. 3.  Do you have the ability to pay for your medications? Pending Medicaid. 4.  How is your group participation? Patient has attended some groups.      GOALS UPDATE:   Time frame for Short-Term Goals: Daily      Paloma Barba

## 2021-04-08 NOTE — PROGRESS NOTES
Patient approaches writer complaining of sore throat, and headache. Tylenol 650mg po prn given per order. Patient reports he is tasting and smelling ok. Provider notified. Awaiting response.

## 2021-04-08 NOTE — PROGRESS NOTES
Daily Progress Note  Ej Garcia MD  4/8/2021  CHIEF COMPLAINT:  Suicidal ideation with a plan    Reviewed patient's current plan of care and vital signs with nursing staff. Sleep:  7 hours last night  Attending groups: No: mostly isolated to his roonm    SUBJECTIVE:    Patient reports that his mood continues to be low. Rates his mood 2 out of 10 with 10 being the best mood. Endorses suicidal thoughts still come and go. Denies any active plans. Contracts for safety here. Endorses constant feelings of helplessness and hopelessness. Patient is now interested in going to a sober living facility from here following which she has plans to go to [place] to live with his sister. We will discuss this further with the social work. He is tolerating Zoloft well and denies any side effect from the medication. We will continue to titrate his medication slowly. Mental Status Exam  Level of consciousness:  Within normal limits  Appearance: Hospital attire, seated in chair, with good grooming and hygiene   Behavior/Motor: No abnormalities noted  Attitude toward examiner:  Cooperative, attentive, good eye contact  Speech:  spontaneous, normal rate, normal volume and well articulated  Mood:  depressed  Affect: down  Thought processes:  linear, goal directed and coherent  Thought content:  denies homicidal ideation  Suicidal Ideation: passive suicidal ideation  Delusions:  no evidence of delusions  Perceptual Disturbance:  denies any perceptual disturbance  Cognition:  Oriented to self, location, time, and situation  Memory: age appropriate  Insight & Judgement: improving  Medication side effects:  denies       Data   height is 5' 10\" (1.778 m) and weight is 232 lb (105.2 kg). His tympanic temperature is 96.5 °F (35.8 °C). His blood pressure is 127/89 and his pulse is 90. His respiration is 18 and oxygen saturation is 95%.    Labs:   Admission on 04/06/2021   Component Date Value Ref Range Status    WBC 04/07/2021 5.5  4.8 - 10.8 thou/mm3 Final    RBC 04/07/2021 3.86* 4.70 - 6.10 mill/mm3 Final    Hemoglobin 04/07/2021 12.6* 14.0 - 18.0 gm/dl Final    Hematocrit 04/07/2021 39.6* 42.0 - 52.0 % Final    MCV 04/07/2021 102.6* 80.0 - 94.0 fL Final    MCH 04/07/2021 32.6  26.0 - 33.0 pg Final    MCHC 04/07/2021 31.8* 32.2 - 35.5 gm/dl Final    RDW-CV 04/07/2021 12.3  11.5 - 14.5 % Final    RDW-SD 04/07/2021 46.1* 35.0 - 45.0 fL Final    Platelets 34/71/6158 219  130 - 400 thou/mm3 Final    MPV 04/07/2021 9.4  9.4 - 12.4 fL Final    Performed at [address] [place] [address], [address]            Medications  Current Facility-Administered Medications: sertraline (ZOLOFT) tablet 50 mg, 50 mg, Oral, Daily  amLODIPine (NORVASC) tablet 10 mg, 10 mg, Oral, Daily  aspirin chewable tablet 81 mg, 81 mg, Oral, Daily  nicotine (NICODERM CQ) 14 MG/24HR 1 patch, 1 patch, Transdermal, Daily  pantoprazole (PROTONIX) tablet 40 mg, 40 mg, Oral, QAM AC  acetaminophen (TYLENOL) tablet 650 mg, 650 mg, Oral, Q4H PRN  ibuprofen (ADVIL;MOTRIN) tablet 400 mg, 400 mg, Oral, Q6H PRN  magnesium hydroxide (MILK OF MAGNESIA) 400 MG/5ML suspension 30 mL, 30 mL, Oral, Daily PRN  aluminum & magnesium hydroxide-simethicone (MAALOX) 200-200-20 MG/5ML suspension 30 mL, 30 mL, Oral, Q6H PRN  hydrOXYzine (ATARAX) tablet 50 mg, 50 mg, Oral, TID PRN  traZODone (DESYREL) tablet 50 mg, 50 mg, Oral, Nightly PRN    ASSESSMENT  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 Supportive Therapy conducted.   Probable discharge is TBD  Follow-up daily when inpatient    More than 16 mins of the session was spent doing Supportive psychotherapy. Session started lasted over 30mins    Electronically signed by Mary Galvez MD on 4/8/21 at 10:11 AM EDT    **This report has been created using voice recognition software. It may contain minor errors which are inherent in voice recognition technology. **      Carlee Kamara is a 61 y.o. male being evaluated by a Virtual Visit (video visit) encounter to address concerns as mentioned above. A caregiver was present in the room along with the patient. Pursuant to the emergency declaration under the [address], [address] [place] [address] authority and the [address] and "[employer]" General Act, this Virtual Visit was conducted with patient's (and/or legal guardian's) consent, to reduce the patient's risk of exposure to COVID-19 and provide necessary medical care. Services were provided through a video synchronous discussion virtually to substitute for in-person visit by provider. Patient is present at [facility], [facility] and I am physically present at [facility]    --Mary Galvez MD on 4/8/2021 at 10:11 AM    An electronic signature was used to authenticate this note. **This report has been created using voice recognition software. It may contain minor errors which are inherent in voice recognition technology. **

## 2021-04-09 PROCEDURE — 1240000000 HC EMOTIONAL WELLNESS R&B

## 2021-04-09 PROCEDURE — 99232 SBSQ HOSP IP/OBS MODERATE 35: CPT | Performed by: PSYCHIATRY & NEUROLOGY

## 2021-04-09 PROCEDURE — 6370000000 HC RX 637 (ALT 250 FOR IP): Performed by: PHYSICIAN ASSISTANT

## 2021-04-09 PROCEDURE — 6370000000 HC RX 637 (ALT 250 FOR IP): Performed by: PSYCHIATRY & NEUROLOGY

## 2021-04-09 PROCEDURE — 90833 PSYTX W PT W E/M 30 MIN: CPT | Performed by: PSYCHIATRY & NEUROLOGY

## 2021-04-09 RX ADMIN — SERTRALINE 50 MG: 50 TABLET, FILM COATED ORAL at 08:54

## 2021-04-09 RX ADMIN — PHENOL 1 SPRAY: 1.5 LIQUID ORAL at 07:55

## 2021-04-09 RX ADMIN — ASPIRIN 81 MG: 81 TABLET, CHEWABLE ORAL at 08:54

## 2021-04-09 RX ADMIN — TRAZODONE HYDROCHLORIDE 50 MG: 50 TABLET ORAL at 21:07

## 2021-04-09 RX ADMIN — AMLODIPINE BESYLATE 10 MG: 10 TABLET ORAL at 08:54

## 2021-04-09 RX ADMIN — HYDROXYZINE HYDROCHLORIDE 50 MG: 25 TABLET ORAL at 21:07

## 2021-04-09 RX ADMIN — PANTOPRAZOLE SODIUM 40 MG: 40 TABLET, DELAYED RELEASE ORAL at 07:54

## 2021-04-09 ASSESSMENT — PAIN DESCRIPTION - LOCATION: LOCATION: BACK

## 2021-04-09 ASSESSMENT — PAIN DESCRIPTION - ORIENTATION: ORIENTATION: LOWER

## 2021-04-09 ASSESSMENT — PAIN DESCRIPTION - PAIN TYPE: TYPE: CHRONIC PAIN

## 2021-04-09 NOTE — BH NOTE
Group Therapy Note    Date: 4/9/2021  Start Time: 11:00  End Time:  11:30  Number of Participants: 5    Type of Group: Healthy Living/Wellness      Group's Goal:  Increase insight into Self Esteem. Status After Intervention:  Improved    Participation Level:  Active Listener and Interactive    Participation Quality: Appropriate, Attentive, Sharing and Supportive      Speech:  normal      Thought Process/Content: Logical      Affective Functioning: Incongruent, Flat and Constricted/Restricted      Mood: depressed      Level of consciousness:  Alert, Oriented x4 and Attentive      Response to Learning: Able to verbalize current knowledge/experience, Able to verbalize/acknowledge new learning, Able to retain information, Capable of insight and Able to change behavior      Endings: None Reported    Modes of Intervention: Education, Support and Activity      Discipline Responsible: Registered Nurse      Signature:  MARYAM Lara RN MSN

## 2021-04-09 NOTE — BH NOTE
PLAN OF CARE:     Start Time: 0900  End Time:  0930    Group Topic:  Daily Goals    Group Type:   Goal Group    Intervention/Goal:  To increase support and identify daily goals    Attendance:   Attended group     Affect:   Brightens with interaction    Behavior:  Cooperative and pleasant    Response:  appropriate    Daily Goal:   Stay focused.     Progress:  Progressing to goal

## 2021-04-09 NOTE — BH NOTE
Group Therapy Note    Date: 4/9/2021     Start Time:  1000  End Time:   1030    Number of Participants: 6    Type of Group: Recreational/Self-Esteem    Patient's Goal:  Increase self-esteem and socialization. Notes:   Patient participated in self-esteem jenga and shared his answers with the others in the group.     Status After Intervention:  Improved    Participation Level: Interactive    Participation Quality: Appropriate, Attentive and Sharing      Speech:  normal      Thought Process/Content: Logical      Affective Functioning: Congruent      Mood: euthymic      Level of consciousness:  Oriented x4      Response to Learning: Progressing to goal      Endings: None Reported    Modes of Intervention: Support, Socialization, Exploration and Activity      Discipline Responsible: Psychoeducational Specialist      Signature:  Raleigh Rajput

## 2021-04-09 NOTE — PLAN OF CARE
Problem: Altered Mood, Depressive Behavior:  Goal: Able to verbalize acceptance of life and situations over which he or she has no control  Description: Able to verbalize acceptance of life and situations over which he or she has no control  Outcome: Met This Shift  Note: Continues to work on acceptance. Goal: Able to verbalize and/or display a decrease in depressive symptoms  Description: Able to verbalize and/or display a decrease in depressive symptoms  Outcome: Met This Shift  Note: States his depression is better. Rates his mood a 7 with 10 being the best. Affect flat, brightened. Good eye contact. States he is hopeful for the future. Good peer interaction. Goal: Ability to disclose and discuss suicidal ideas will improve  Description: Ability to disclose and discuss suicidal ideas will improve  Outcome: Met This Shift  Note: Denies suicidal ideations. Goal: Absence of self-harm  Description: Absence of self-harm  Outcome: Met This Shift  Note: Pt remains safe and free from harm. 15 minute safety checks are being completed throughout shift. Goal: Patient specific goal  Description: Patient specific goal  Outcome: Met This Shift  Note: To stay positive. Goal: Participates in care planning  Description: Participates in care planning  Outcome: Met This Shift  Note: Care plan reviewed with patient and verbalize understanding of the plan of care and contribute to goal setting. Problem: Discharge Planning:  Goal: Discharged to appropriate level of care  Description: Discharged to appropriate level of care  Outcome: Ongoing  Note: Discharge planning in progress. Problem: Activity:  Goal: Sleeping patterns will improve  Description: Sleeping patterns will improve  Outcome: Met This Shift  Note: Slept 8 hours last evening and requested trazodone this shift.       Problem: Pain:  Goal: Pain level will decrease  Description: Pain level will decrease  Outcome: Met This Shift  Note: Denies pain this shift.      Problem: Coping:  Goal: Ability to identify problematic behaviors that deter socialization will improve  Description: Ability to identify problematic behaviors that deter socialization will improve  4/9/2021 0234 by Yimi Suárez RN  Outcome: Ongoing  4/8/2021 1504 by Raleigh Rajput  Outcome: Ongoing     Problem: Anxiety:  Goal: Level of anxiety will decrease  Description: Level of anxiety will decrease  Outcome: Not Met This Shift  Note: Continue to be anxious and was given atarax with relief.

## 2021-04-09 NOTE — PROGRESS NOTES
ED Time Seen By Provider Entered On:  8/18/2018 17:00     Performed On:  8/18/2018 17:00  by Elivs Hamlin PA-C               Time Seen By Provider   Time Seen by Provider :   8/18/2018 17:00    Elvis Hamlin PA-C - 8/18/2018 17:00            Group Therapy Note    Date: 04/08/2021  Start Time: 2000  End Time:  2020  Number of Participants:     Type of Group: Wrap-Up    Wellness Binder Information  Module Name:    Session Number:      Patient's Goal:  To stay positive     Notes:  Goal met     Status After Intervention:  Unchanged    Participation Level:  Active Listener and Interactive    Participation Quality: Appropriate      Speech:  normal      Thought Process/Content: Logical      Affective Functioning: Flat      Mood: anxious and depressed      Level of consciousness:  Alert      Response to Learning: Able to verbalize current knowledge/experience, Able to verbalize/acknowledge new learning and Able to retain information      Endings: None Reported    Modes of Intervention: Support      Discipline Responsible: Registered Nurse      Signature:  Augusta Martinez RN

## 2021-04-09 NOTE — PLAN OF CARE
Goal: Ability to disclose and discuss suicidal ideas will improve  Description: Ability to disclose and discuss suicidal ideas will improve  4/9/2021 1020 by Jami Truong RN  Outcome: Ongoing  Note: Pt denies suicidal ideations shared positive thoughts on recovery  4/9/2021 0234 by Edith Marte RN  Outcome: Met This Shift  Note: Denies suicidal ideations. Goal: Patient specific goal  Description: Patient specific goal  4/9/2021 1020 by Jami Truong RN  Outcome: Ongoing  Note: \"Stay focused\"  4/9/2021 0234 by Edith Marte RN  Outcome: Met This Shift  Note: To stay positive. Problem: Discharge Planning:  Goal: Discharged to appropriate level of care  Description: Discharged to appropriate level of care  4/9/2021 1020 by Jami Truong RN  Outcome: Ongoing  Note: Pt working with treatment team , he has been provided an application to complete for housing through the recovery board. 4/9/2021 0234 by Edith Marte RN  Outcome: Ongoing  Note: Discharge planning in progress. Problem: Activity:  Goal: Sleeping patterns will improve  Description: Sleeping patterns will improve  4/9/2021 1020 by Jami Truong RN  Outcome: Ongoing  Note: Pt slept 8 hrs continuous sleep  4/9/2021 0234 by Edith Marte RN  Outcome: Met This Shift  Note: Slept 8 hours last evening and requested trazodone this shift. Problem: Pain:  Goal: Pain level will decrease  Description: Pain level will decrease  4/9/2021 1020 by Jami Truong RN  Outcome: Ongoing  Note: Pt encouraged to inform staff should he begin to feel discomfort. Pt voiced understanding  4/9/2021 0234 by Edith Marte RN  Outcome: Met This Shift  Note: Denies pain this shift.       Problem: Coping:  Goal: Ability to identify problematic behaviors that deter socialization will improve  Description: Ability to identify problematic behaviors that deter socialization will improve  4/9/2021 0234 by Yuliya

## 2021-04-09 NOTE — PLAN OF CARE
Patient has attended at least 2 groups today and has also been out of his room to socialize with others this shift so he has met his socialization goal.

## 2021-04-09 NOTE — PROGRESS NOTES
Daily Progress Note  Lovella Cogan, MD  4/9/2021  CHIEF COMPLAINT:  Suicidal ideation with a plan    Reviewed patient's current plan of care and vital signs with nursing staff. Sleep:  7 hours last night  Attending groups: No: mostly isolated to his roonm    SUBJECTIVE:    Patient notes that his mood is lighter today. Continues to endorse fleeting suicidal thoughts. Reports them that they usually come and go at nighttime now. Reports that he is looking forward to go to a sober living facility from here. Patient was dealing with severe sore throat and mild fever yesterday. COVID test was done and was negative. He is actively working with social work and Flip Mead from [facility] to get to a sober living facility. Endorses some feelings of helplessness and hopelessness. Reports he had a good conversation with his sister from [place] yesterday. Denies any side effects from Zoloft. Continues to report that he has been feeling very tired here on the unit. Reports feeling restless last night. However reports that he had a good night sleep    Mental Status Exam  Level of consciousness:  Within normal limits  Appearance: Hospital attire, seated in chair, with good grooming and hygiene   Behavior/Motor: No abnormalities noted  Attitude toward examiner:  Cooperative, attentive, good eye contact  Speech:  spontaneous, normal rate, normal volume and well articulated  Mood:  depressed  Affect: Mood congruent  Thought processes:  linear, goal directed and coherent  Thought content:  denies homicidal ideation  Suicidal Ideation: passive suicidal ideation-unchanged   delusions:  no evidence of delusions  Perceptual Disturbance:  denies any perceptual disturbance  Cognition:  Oriented to self, location, time, and situation  Memory: age appropriate  Insight & Judgement: improving  Medication side effects:  denies       Data   height is 5' 10\" (1.778 m) and weight is 232 lb (105.2 kg).  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 Attempt to develop insight  Psycho-education conducted. Supportive Therapy conducted. Probable discharge is TBD  Follow-up daily when inpatient    More than 16 mins of the session was spent doing Supportive psychotherapy. Session started lasted over [place] is a 61 y.o. male being evaluated by a Virtual Visit (video visit) encounter to address concerns as mentioned above. A caregiver was present in the room along with the patient. Pursuant to the emergency declaration under the [facility], [address] [place] [address] authority and the [employer] General Act, this Virtual Visit was conducted with patient's (and/or legal guardian's) consent, to reduce the patient's risk of exposure to COVID-19 and provide necessary medical care. Services were provided through a video synchronous discussion virtually to substitute for in-person visit by provider.    Patient is present at [facility], [address] and I am physically present at [facility]    --Jamey Morrow MD on 4/9/2021 at 2:08 PM    An electronic signature was used to authenticate this note. **This report has been created using voice recognition software. It may contain minor errors which are inherent in voice recognition technology. **

## 2021-04-10 PROCEDURE — 1240000000 HC EMOTIONAL WELLNESS R&B

## 2021-04-10 PROCEDURE — 6370000000 HC RX 637 (ALT 250 FOR IP): Performed by: PHYSICIAN ASSISTANT

## 2021-04-10 PROCEDURE — 99231 SBSQ HOSP IP/OBS SF/LOW 25: CPT | Performed by: NURSE PRACTITIONER

## 2021-04-10 PROCEDURE — 6370000000 HC RX 637 (ALT 250 FOR IP): Performed by: PSYCHIATRY & NEUROLOGY

## 2021-04-10 RX ADMIN — ACETAMINOPHEN 650 MG: 325 TABLET ORAL at 07:54

## 2021-04-10 RX ADMIN — PANTOPRAZOLE SODIUM 40 MG: 40 TABLET, DELAYED RELEASE ORAL at 07:53

## 2021-04-10 RX ADMIN — TRAZODONE HYDROCHLORIDE 50 MG: 50 TABLET ORAL at 21:35

## 2021-04-10 RX ADMIN — PHENOL 1 SPRAY: 1.5 LIQUID ORAL at 17:45

## 2021-04-10 RX ADMIN — AMLODIPINE BESYLATE 10 MG: 10 TABLET ORAL at 07:53

## 2021-04-10 RX ADMIN — SERTRALINE 50 MG: 50 TABLET, FILM COATED ORAL at 07:53

## 2021-04-10 RX ADMIN — ASPIRIN 81 MG: 81 TABLET, CHEWABLE ORAL at 07:53

## 2021-04-10 RX ADMIN — PHENOL 1 SPRAY: 1.5 LIQUID ORAL at 06:56

## 2021-04-10 ASSESSMENT — PAIN DESCRIPTION - LOCATION: LOCATION: THROAT

## 2021-04-10 ASSESSMENT — PAIN SCALES - GENERAL
PAINLEVEL_OUTOF10: 0
PAINLEVEL_OUTOF10: 7

## 2021-04-10 NOTE — PROGRESS NOTES
Psychiatry Progress Note      4-    CC: Suidical ideation with plan                    Subjective    Progress:  Dolly Calhoun reports feeling a little better. Today. Reports depression is a little less. Reports feelings of self harm still \"come and go. \" States meds seem to be helping some. Denies having side effects. Good med compliance is verified. Reports appetite is good and slept well last night. Verified slept 8 hours continuous. States he has been attending groups. Denies getting any visits or talking to anyone on phone. Objective  BP (!) 145/85   Pulse 83   Temp 96.7 °F (35.9 °C) (Tympanic)   Resp 16   Ht 5' 10\" (1.778 m)   Wt 232 lb (105.2 kg)   SpO2 97%   BMI 33.29 kg/m²      MSE:  Level of consciousness: Alert  Appearance: hospital attire, in chair and fair grooming   Behavior/Motor: no abnormalities noted   Attitude toward examiner: cooperative   Speech: Normal volume, goal directed, NRR  Mood: Dysthymic  Affect: Reactive  Thought processes: Linear and goal directed   Suicidal Ideation: Reports feelings of self harm still \"come and go. \"   Homicidal ideation: Denies homicidal ideations  Delusions: No evidence of delusions is observed  Perceptual Disturbance: Denies AH/VH;  No evidence of psychosis is observed. 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       Breanna Penaloza, CNP  8-      Major Depression, severe recurrent without psychotic features      I concur with above clinical findings and plan of care

## 2021-04-10 NOTE — GROUP NOTE
Group Therapy Note    Date: 4/10/2021    Group Start Time: 3171  Group End Time: 2283  Group Topic: Psychotherapy    [facility] Adult [facility] 4E    EUGENIA Hays        Group Therapy Note: Group members listened to a poem about the worst day ever. Discussed the poem and the listened to the poem backwards. We then discussed about perspective and positive thinking. Group then completed positive thinking activity     Attendees: 6          Patient's Goal:   Stay focused and be positive    Notes:  Patient was actively engaged in group. He talked with the group about them poem. He shared with the group about being positive as one of the parts he put down for his activity. Status After Intervention:  Improved    Participation Level:  Active Listener and Interactive    Participation Quality: Appropriate, Attentive and Sharing      Speech:  normal      Thought Process/Content: Logical      Affective Functioning: Congruent      Mood: euthymic      Level of consciousness:  Alert, Oriented x4 and Attentive      Response to Learning: Able to verbalize current knowledge/experience, Able to verbalize/acknowledge new learning, Able to retain information, Capable of insight, Able to change behavior and Progressing to goal      Endings: None Reported    Modes of Intervention: Support, Exploration and Problem-solving      Discipline Responsible: /Counselor      Signature:  EUGENIA Ariza

## 2021-04-10 NOTE — GROUP NOTE
Group Therapy Note    Date: 4/10/2021    Group Start Time: 1020  Group End Time: 1100  Group Topic: Recreational    [facility] 4[facility]    JOSIAH Lau    Group Therapy Note    Attendees: 4         Patient's Goal:  Pt will improve socialization and knowledge of coping skills through participation of coping skills pictionary. Notes:  Pt was present in group. Pt was appropriate and pleasant. Pt was interactive with peers and displayed active participation throughout the session. Status After Intervention:  Improved    Participation Level:  Active Listener and Interactive    Participation Quality: Appropriate, Attentive, Sharing and Supportive      Speech:  normal      Thought Process/Content: Logical      Affective Functioning: Congruent      Mood: euthymic      Level of consciousness:  Alert, Oriented x4 and Attentive      Response to Learning: Able to verbalize current knowledge/experience, Able to verbalize/acknowledge new learning, Able to retain information, Capable of insight, Able to change behavior and Progressing to goal      Endings: None Reported    Modes of Intervention: Education, Support, Socialization, Exploration, Clarifying, Problem-solving and Activity      Discipline Responsible: Psychoeducational Specialist      Signature:  JOSIAH Haile

## 2021-04-10 NOTE — PLAN OF CARE
Problem: Role Relationship:  Goal: Ability to demonstrate positive changes in social behaviors and relationships will improve  Description: Ability to demonstrate positive changes in social behaviors and relationships will improve  Outcome: Met This Shift  Note: Pt has attended 2/2 groups that have been offered on the unit so far this shift. Pt has been seen out on the unit interacting with peers. Pt has met his socialization goal for this shift.

## 2021-04-10 NOTE — PLAN OF CARE
Problem: Altered Mood, Depressive Behavior:  Goal: Able to verbalize acceptance of life and situations over which he or she has no control  Description: Able to verbalize acceptance of life and situations over which he or she has no control  4/10/2021 1038 by Naila Lima RN  Outcome: Ongoing  Note: Patient verbalizes some acceptance of situations over which he has no control. Encouraged patient to attend group therapy. 4/9/2021 2318 by Rajan Pichardo RN  Outcome: Ongoing  Note: Continues to work towards accepting life situations over which he has no control. Goal: Able to verbalize and/or display a decrease in depressive symptoms  Description: Able to verbalize and/or display a decrease in depressive symptoms  4/10/2021 1038 by Naila Lima RN  Outcome: Ongoing  Note: Patient reports mood 7/10 with 10 being normal. Has flat affect. Speech clear. fair eye contact. Reports some hope for future and identifies sister as their support system. 4/9/2021 2318 by Rajan Pichardo RN  Outcome: Met This Shift  Note: Rates mood a 7/10 this shift. Goal: Ability to disclose and discuss suicidal ideas will improve  Description: Ability to disclose and discuss suicidal ideas will improve  4/10/2021 1038 by Naila Lima RN  Outcome: Met This Shift  Note: Patient denies suicidal ideations, no plan or intent to harm self. Patient remains on 15 checks for safety. Instructed to seek staff as needed for thoughts of self harm. 4/9/2021 2318 by Rajan Pichardo RN  Outcome: Met This Shift  Note: Denies suicidal thoughts or plans   Goal: Absence of self-harm  Description: Absence of self-harm  4/10/2021 1038 by Naila Lima RN  Outcome: Met This Shift  Note: No self harm behaviors were observed or reported so far this shift. Remains on every 15 minutes precautions for safety. 4/9/2021 2318 by Rajan Pichardo RN  Outcome: Met This Shift  Note: No self harm thoughts, plans, or behaviors this shift.    Goal: Patient specific emotional support. 4/9/2021 2318 by David Fitzgerald RN  Outcome: Met This Shift  Note: Denies pain this shift. Problem: Coping:  Goal: Ability to identify problematic behaviors that deter socialization will improve  Description: Ability to identify problematic behaviors that deter socialization will improve  4/10/2021 1038 by Chris Zaidi RN  Outcome: Ongoing  4/9/2021 2318 by David Fitzgerald RN  Outcome: Ongoing     Problem: Anxiety:  Goal: Level of anxiety will decrease  Description: Level of anxiety will decrease  4/10/2021 1038 by Chris Zaidi RN  Outcome: Ongoing  Note: Patient reports  anxiety. Pt taking Atarax prn. Verbalized medication was effective. 4/9/2021 2318 by David Fitzgerald RN  Outcome: Met This Shift  Note: Took atarax for anxiety this shift. Care plan reviewed with patient.   Patient does verbalize understanding of the plan of care and does not contribute to goal setting

## 2021-04-10 NOTE — PROGRESS NOTES
Group Therapy Note    Date: 4/9/2021  Start Time: 2000  End Time:  2020    Type of Group: Wrap-Up/Relaxation    Status After Intervention:  Unchanged    Participation Level:  Active Listener    Participation Quality: Appropriate      Speech:  normal      Thought Process/Content: Logical      Affective Functioning: Congruent      Mood: anxious      Level of consciousness:  Alert      Response to Learning: Able to verbalize current knowledge/experience      Endings: None Reported    Modes of Intervention: Support      Discipline Responsible: Registered Nurse      Signature:  Karen Rojas RN

## 2021-04-10 NOTE — PLAN OF CARE
Problem: Altered Mood, Depressive Behavior:  Goal: Able to verbalize and/or display a decrease in depressive symptoms  Description: Able to verbalize and/or display a decrease in depressive symptoms  4/9/2021 2318 by Yamil Irizarry RN  Outcome: Met This Shift  Note: Rates mood a 7/10 this shift. 4/9/2021 1020 by Travis Rene RN  Outcome: Ongoing  Note: Pt reports depression is less than upon admission  Goal: Ability to disclose and discuss suicidal ideas will improve  Description: Ability to disclose and discuss suicidal ideas will improve  4/9/2021 2318 by Yamil Irizarry RN  Outcome: Met This Shift  Note: Denies suicidal thoughts or plans   4/9/2021 1020 by Travis Rene RN  Outcome: Ongoing  Note: Pt denies suicidal ideations shared positive thoughts on recovery  Goal: Absence of self-harm  Description: Absence of self-harm  4/9/2021 2318 by Yamil Irizarry RN  Outcome: Met This Shift  Note: No self harm thoughts, plans, or behaviors this shift. 4/9/2021 1020 by Travis Rene RN  Outcome: Met This Shift  Goal: Participates in care planning  Description: Participates in care planning  4/9/2021 2318 by Yamil Irizarry RN  Outcome: Met This Shift  Note: Care plan reviewed with patient. Patient verbalize understanding of the plan of care and contribute to goal setting. 4/9/2021 1020 by Travis Rene RN  Outcome: Met This Shift  Note: Pt attending groups, complied with medications     Problem: Discharge Planning:  Goal: Discharged to appropriate level of care  Description: Discharged to appropriate level of care  4/9/2021 2318 by Yamil Irizarry RN  Outcome: Met This Shift  Note: Works with an interdisciplinary team towards meeting discharge needs   4/9/2021 1020 by Travis Rene RN  Outcome: Ongoing  Note: Pt working with treatment team , he has been provided an application to complete for housing through the recovery board. Problem:  Activity:  Goal: Sleeping patterns will improve  Description: Sleeping patterns will improve  4/9/2021 2318 by Idris Schaefer RN  Outcome: Met This Shift  Note: Took trazodone for sleep   4/9/2021 1020 by Eyad Fonseca RN  Outcome: Ongoing  Note: Pt slept 8 hrs continuous sleep     Problem: Pain:  Goal: Pain level will decrease  Description: Pain level will decrease  4/9/2021 2318 by Idris Schaefer RN  Outcome: Met This Shift  Note: Denies pain this shift. 4/9/2021 1020 by Eyad Fonseca RN  Outcome: Ongoing  Note: Pt encouraged to inform staff should he begin to feel discomfort. Pt voiced understanding     Problem: Anxiety:  Goal: Level of anxiety will decrease  Description: Level of anxiety will decrease  4/9/2021 2318 by Idris Schaefer RN  Outcome: Met This Shift  Note: Took atarax for anxiety this shift. 4/9/2021 1020 by Eyad Fonseca RN  Outcome: Ongoing  Note: Pt reports his anxiety is at a low level but admits it is related to his lack of stable housing. He was given housing application to complete thru the Recovery Board     Problem: Altered Mood, Depressive Behavior:  Goal: Able to verbalize acceptance of life and situations over which he or she has no control  Description: Able to verbalize acceptance of life and situations over which he or she has no control  4/9/2021 2318 by Idris Schaefer RN  Outcome: Ongoing  Note: Continues to work towards accepting life situations over which he has no control. 4/9/2021 1020 by Eyad Fonseca RN  Outcome: Ongoing  Note: Continues to work towards accepting life situations over which he has no control. Pt was provided and explained recovery board housing application. He was asked if he needed assistance which he denied .  Pt is Completing housing application with the recovery board  Goal: Patient specific goal  Description: Patient specific goal  4/9/2021 2318 by Idris Schaefer RN  Outcome: Ongoing  Note: Encouraged to set a daily goal. 4/9/2021 1020 by Ely Smith RN  Outcome: Ongoing  Note: \"Stay focused\"     Problem: Coping:  Goal: Ability to identify problematic behaviors that deter socialization will improve  Description: Ability to identify problematic behaviors that deter socialization will improve  Outcome: Ongoing

## 2021-04-11 PROCEDURE — 6370000000 HC RX 637 (ALT 250 FOR IP): Performed by: PSYCHIATRY & NEUROLOGY

## 2021-04-11 PROCEDURE — 1240000000 HC EMOTIONAL WELLNESS R&B

## 2021-04-11 PROCEDURE — 99231 SBSQ HOSP IP/OBS SF/LOW 25: CPT | Performed by: NURSE PRACTITIONER

## 2021-04-11 PROCEDURE — 6370000000 HC RX 637 (ALT 250 FOR IP): Performed by: PHYSICIAN ASSISTANT

## 2021-04-11 RX ADMIN — SERTRALINE 50 MG: 50 TABLET, FILM COATED ORAL at 07:48

## 2021-04-11 RX ADMIN — AMLODIPINE BESYLATE 10 MG: 10 TABLET ORAL at 07:48

## 2021-04-11 RX ADMIN — ACETAMINOPHEN 650 MG: 325 TABLET ORAL at 07:48

## 2021-04-11 RX ADMIN — ASPIRIN 81 MG: 81 TABLET, CHEWABLE ORAL at 07:48

## 2021-04-11 RX ADMIN — PANTOPRAZOLE SODIUM 40 MG: 40 TABLET, DELAYED RELEASE ORAL at 07:48

## 2021-04-11 RX ADMIN — TRAZODONE HYDROCHLORIDE 50 MG: 50 TABLET ORAL at 21:05

## 2021-04-11 RX ADMIN — PHENOL 1 SPRAY: 1.5 LIQUID ORAL at 18:00

## 2021-04-11 RX ADMIN — ACETAMINOPHEN 650 MG: 325 TABLET ORAL at 21:08

## 2021-04-11 ASSESSMENT — PAIN SCALES - GENERAL
PAINLEVEL_OUTOF10: 0
PAINLEVEL_OUTOF10: 0

## 2021-04-11 ASSESSMENT — PAIN DESCRIPTION - DESCRIPTORS: DESCRIPTORS: SORE

## 2021-04-11 ASSESSMENT — PAIN - FUNCTIONAL ASSESSMENT
PAIN_FUNCTIONAL_ASSESSMENT: ACTIVITIES ARE NOT PREVENTED
PAIN_FUNCTIONAL_ASSESSMENT: ACTIVITIES ARE NOT PREVENTED

## 2021-04-11 ASSESSMENT — PAIN DESCRIPTION - ORIENTATION: ORIENTATION: MID

## 2021-04-11 ASSESSMENT — PAIN DESCRIPTION - FREQUENCY: FREQUENCY: CONTINUOUS

## 2021-04-11 NOTE — GROUP NOTE
Group Therapy Note    Date: 4/11/2021    Group Start Time: 0930  Group End Time: 1000  Group Topic: [address] Adult Psych 4E    JOSIAH Lau    Group Therapy Note    Attendees: 5         Patient's Goal:  \"Continue to feel better than I did. \"    Notes:  Pt progress is ongoing. Status After Intervention:  Improved    Participation Level:  Active Listener and Interactive    Participation Quality: Appropriate, Attentive, Sharing and Supportive      Speech:  normal      Thought Process/Content: Logical      Affective Functioning: Congruent      Mood: euthymic      Level of consciousness:  Alert, Oriented x4 and Attentive      Response to Learning: Able to verbalize current knowledge/experience, Able to verbalize/acknowledge new learning, Able to retain information, Capable of insight, Able to change behavior and Progressing to goal      Endings: None Reported    Modes of Intervention: Education, Support, Socialization, Exploration, Clarifying, Activity, Limit-setting and Reality-testing      Discipline Responsible: Psychoeducational Specialist      Signature:  JOSIAH Huang

## 2021-04-11 NOTE — PLAN OF CARE
Problem: Altered Mood, Depressive Behavior:  Goal: Able to verbalize and/or display a decrease in depressive symptoms  Description: Able to verbalize and/or display a decrease in depressive symptoms  4/11/2021 0013 by Lalo Putnam RN  Outcome: Met This Shift  Note: Rates mood a 7/10  4/10/2021 1038 by Jose Stafford RN  Outcome: Ongoing  Note: Patient reports mood 7/10 with 10 being normal. Has flat affect. Speech clear. fair eye contact. Reports some hope for future and identifies sister as their support system. Goal: Ability to disclose and discuss suicidal ideas will improve  Description: Ability to disclose and discuss suicidal ideas will improve  4/11/2021 0013 by Lalo Putnam RN  Outcome: Met This Shift  Note: Denies suicidal thoughts or plans   4/10/2021 1038 by Jose Stafford RN  Outcome: Met This Shift  Note: Patient denies suicidal ideations, no plan or intent to harm self. Patient remains on 15 checks for safety. Instructed to seek staff as needed for thoughts of self harm. Goal: Absence of self-harm  Description: Absence of self-harm  4/11/2021 0013 by Lalo Putnam RN  Outcome: Met This Shift  Note: No self harm thoughts, plans, or behaviors this shift   4/10/2021 1038 by Jose Stafford RN  Outcome: Met This Shift  Note: No self harm behaviors were observed or reported so far this shift. Remains on every 15 minutes precautions for safety. Goal: Participates in care planning  Description: Participates in care planning  4/11/2021 0013 by Lalo Putnam RN  Outcome: Met This Shift  Note: Care plan reviewed with patient. Patient verbalize understanding of the plan of care and contribute to goal setting.     4/10/2021 1038 by Jose Stafford RN  Outcome: Ongoing  Note: This patient participates in care planning      Problem: Discharge Planning:  Goal: Discharged to appropriate level of care  Description: Discharged to appropriate level of care  4/11/2021 0013 by Lalo Putnam RN  Outcome: Met This Shift  Note: Works with an interdisciplinary team towards meeting discharge needs. 4/10/2021 1038 by Mario Reich RN  Outcome: Not Met This Shift  Note: Discharge planners working with patient to achieve optimal discharge plan, specific to the needs of this patient. Problem: Activity:  Goal: Sleeping patterns will improve  Description: Sleeping patterns will improve  4/11/2021 0013 by Román Green RN  Outcome: Met This Shift  Note: Took trazodone for sleep   4/10/2021 1038 by Mario Reich RN  Outcome: Met This Shift  Note: Patient slept 8C hours last night, reports they slept well. Encouraged patient not to take naps during the day, relax several hours before bed and to take prescribed sleep meds as ordered. Patient verbalized understanding. Problem: Pain:  Goal: Pain level will decrease  Description: Pain level will decrease  4/11/2021 0013 by Román Green RN  Outcome: Met This Shift  Note: States that he had a sore throat today, but is doing well now  4/10/2021 1038 by Mario Reich RN  Outcome: Ongoing  Note: Patient reports throat pain 7 out of 10. Patient taking tylenol for pain. Patient reports lessened pain after taking pain medications. Pain goal of 0 was not met. Non-pharmacological interventions offered were rest and emotional support. Problem: Altered Mood, Depressive Behavior:  Goal: Able to verbalize acceptance of life and situations over which he or she has no control  Description: Able to verbalize acceptance of life and situations over which he or she has no control  4/11/2021 0013 by Román Green RN  Outcome: Ongoing  Note: Working towards accepting life situations over which he has no control. 4/10/2021 1038 by Mario Reich RN  Outcome: Ongoing  Note: Patient verbalizes some acceptance of situations over which he has no control. Encouraged patient to attend group therapy.     Goal: Patient specific goal  Description: Patient specific goal  4/11/2021 0013 by Susanne Fontana Delvin Meza RN  Outcome: Ongoing  Note: Encouraged to set a daily goal.  4/10/2021 1038 by Sara Elias RN  Outcome: Ongoing  Note: Pt has not yet set a goal, will continue to monitor     Problem: Coping:  Goal: Ability to identify problematic behaviors that deter socialization will improve  Description: Ability to identify problematic behaviors that deter socialization will improve  4/11/2021 0013 by Cordelia De Leon RN  Outcome: Ongoing  4/10/2021 1038 by Sara Elias RN  Outcome: Ongoing     Problem: Anxiety:  Goal: Level of anxiety will decrease  Description: Level of anxiety will decrease  4/11/2021 0013 by Cordelia De Leon RN  Outcome: Ongoing  Note: Has generalized anxiety at times   4/10/2021 1038 by Sara Elias RN  Outcome: Ongoing  Note: Patient reports  anxiety. Pt taking Atarax prn. Verbalized medication was effective. Problem: Role Relationship:  Goal: Ability to demonstrate positive changes in social behaviors and relationships will improve  Description: Ability to demonstrate positive changes in social behaviors and relationships will improve  4/11/2021 0013 by Cordelia De Leon RN  Outcome: Ongoing  4/10/2021 1129 by Marc Vasquez  Outcome: Met This Shift  Note: Pt has attended 2/2 groups that have been offered on the unit so far this shift. Pt has been seen out on the unit interacting with peers. Pt has met his socialization goal for this shift.

## 2021-04-11 NOTE — GROUP NOTE
Group Therapy Note    Date: 4/11/2021    Group Start Time: 1055  Group End Time: 6765  Group Topic: Psychoeducation    [facility]    EUGENIA Hays        Group Therapy Note: Group completed check-in activity. Group had discussion about things that they want to improve in their life, talked about ways to get there. Group talked about things that are weighing them down and they want to work on letting go of. Group talked about their inspiration for wanting to want their changes. Attendees: 6         Patient's Goal:  Stay focused and be positive    Notes:  Patient was present and active today during group. He talked about feeling a lot better, communicating and actually talking to people. He shared that he needs to improve the choices that he makes when outside of here. He wants to work on letting go of his shame and his grandkids are what keeps him working towards his goal.     Status After Intervention:  Improved    Participation Level:  Active Listener and Interactive    Participation Quality: Appropriate, Attentive, Sharing and Supportive      Speech:  normal      Thought Process/Content: Logical      Affective Functioning: Congruent      Mood: euthymic      Level of consciousness:  Alert, Oriented x4 and Attentive      Response to Learning: Able to verbalize current knowledge/experience, Able to verbalize/acknowledge new learning, Able to retain information, Capable of insight, Able to change behavior and Progressing to goal      Endings: None Reported    Modes of Intervention: Support and Exploration      Discipline Responsible: /Counselor      Signature:  EUGENIA Ariza

## 2021-04-11 NOTE — PLAN OF CARE
Problem: Altered Mood, Depressive Behavior:  Goal: Able to verbalize acceptance of life and situations over which he or she has no control  Description: Able to verbalize acceptance of life and situations over which he or she has no control  4/11/2021 0950 by Cecile Dewey RN  Outcome: Ongoing  Note: Patient verbalizes some acceptance of situations over which he has no control. Encouraged patient to attend group therapy. 4/11/2021 0013 by Pedro Whitley RN  Outcome: Ongoing  Note: Working towards accepting life situations over which he has no control. Goal: Able to verbalize and/or display a decrease in depressive symptoms  Description: Able to verbalize and/or display a decrease in depressive symptoms  4/11/2021 0950 by Cecile Dewey RN  Outcome: Ongoing  Note: Pt is friendly with staff and peers, mood is 7/10, attends group therapy  4/11/2021 0013 by Pedro Whitley RN  Outcome: Met This Shift  Note: Rates mood a 7/10  Goal: Ability to disclose and discuss suicidal ideas will improve  Description: Ability to disclose and discuss suicidal ideas will improve  4/11/2021 0950 by Cecile Dewey RN  Outcome: Met This Shift  Note: Patient denies suicidal ideations, no plan or intent to harm self. Patient remains on 15 checks for safety. Instructed to seek staff as needed for thoughts of self harm. 4/11/2021 0013 by Pedro Whitley RN  Outcome: Met This Shift  Note: Denies suicidal thoughts or plans   Goal: Absence of self-harm  Description: Absence of self-harm  4/11/2021 0950 by Cecile Dewey RN  Outcome: Met This Shift  Note: No self harm behaviors were observed or reported so far this shift. Remains on every 15 minutes precautions for safety.    4/11/2021 0013 by Pedro Whitley RN  Outcome: Met This Shift  Note: No self harm thoughts, plans, or behaviors this shift   Goal: Patient specific goal  Description: Patient specific goal  4/11/2021 0950 by Cecile Dewey RN  Outcome: Ongoing  Note: Pt has not yet set a goal for the day  4/11/2021 0013 by Dallas Canales RN  Outcome: Ongoing  Note: Encouraged to set a daily goal.  Goal: Participates in care planning  Description: Participates in care planning  4/11/2021 0950 by Antonieta Rush RN  Outcome: Met This Shift  Note: This patient participates in care planning   4/11/2021 0013 by Dallas Canales RN  Outcome: Met This Shift  Note: Care plan reviewed with patient. Patient verbalize understanding of the plan of care and contribute to goal setting. Problem: Discharge Planning:  Goal: Discharged to appropriate level of care  Description: Discharged to appropriate level of care  4/11/2021 0950 by Antonieta Rush RN  Outcome: Not Met This Shift  Note: Discharge planners working with patient to achieve optimal discharge plan, specific to the needs of this patient. 4/11/2021 0013 by Dallas Canales RN  Outcome: Met This Shift  Note: Works with an interdisciplinary team towards meeting discharge needs. Problem: Activity:  Goal: Sleeping patterns will improve  Description: Sleeping patterns will improve  4/11/2021 0950 by Antonieta Rush RN  Outcome: Met This Shift  Note: Patient slept 8C hours last night, reports they slept well. Encouraged patient not to take naps during the day, relax several hours before bed and to take prescribed sleep meds as ordered. Patient verbalized understanding. 4/11/2021 0013 by Dallas Canales RN  Outcome: Met This Shift  Note: Took trazodone for sleep      Problem: Pain:  Goal: Pain level will decrease  Description: Pain level will decrease  4/11/2021 0950 by Antonieta Rush RN  Outcome: Ongoing  Note: Patient reports throat pain 5 out of 10. Patient taking chloraseptic for pain. Patient reports lessened pain after taking pain medications. Pain goal of 0 was not met. Non-pharmacological interventions offered were emotional support.    4/11/2021 0013 by Dallas Canales RN  Outcome: Met This Shift  Note: States that he had a sore throat today, but is doing well now     Problem: Coping:  Goal: Ability to identify problematic behaviors that deter socialization will improve  Description: Ability to identify problematic behaviors that deter socialization will improve  4/11/2021 0950 by Mary Kohler RN  Outcome: Ongoing  4/11/2021 0013 by Katelyn Sneed RN  Outcome: Ongoing     Problem: Role Relationship:  Goal: Ability to demonstrate positive changes in social behaviors and relationships will improve  Description: Ability to demonstrate positive changes in social behaviors and relationships will improve  4/11/2021 0950 by Mary Kohler RN  Outcome: Ongoing  4/11/2021 0013 by Katelyn Sneed RN  Outcome: Ongoing     Problem: Anxiety:  Goal: Level of anxiety will decrease  Description: Level of anxiety will decrease  4/11/2021 0950 by Mary Kohler RN  Outcome: Ongoing  Note: Patient reports little anxiety. Pt taking nothing prn. Using coping skills  4/11/2021 0013 by Katelyn Sneed RN  Outcome: Ongoing  Note: Has generalized anxiety at times    Care plan reviewed with patient.   Patient does verbalize understanding of the plan of care and does not contribute to goal setting

## 2021-04-11 NOTE — PLAN OF CARE
Problem: Role Relationship:  Goal: Ability to demonstrate positive changes in social behaviors and relationships will improve  Description: Ability to demonstrate positive changes in social behaviors and relationships will improve  4/11/2021 1144 by Gema Pena  Outcome: Met This Shift  Note: Pt has attended 3/3 groups that have been offered on the unit so far this shift. Pt has been seen out on the unit interacting with peers. Pt has met the socialization goal set for this shift.

## 2021-04-11 NOTE — GROUP NOTE
Group Therapy Note    Date: 4/11/2021    Group Start Time: 1000  Group End Time: 1030  Group Topic: Recreational    [facility]    JOSIAH Lau    Group Therapy Note    Attendees: 6         Patient's Goal:  Pt will improve socialization and knowledge of coping skills through participation of music group session. Notes:  Pt was present during the session. Pt displayed active participation during the session and identified songs that they enjoyed listening skills. Pt was interactive with peers during the session. Status After Intervention:  Improved    Participation Level:  Active Listener and Interactive    Participation Quality: Appropriate, Attentive, Sharing and Supportive      Speech:  normal      Thought Process/Content: Logical      Affective Functioning: Congruent      Mood: euthymic      Level of consciousness:  Alert, Oriented x4 and Attentive      Response to Learning: Able to verbalize current knowledge/experience, Able to verbalize/acknowledge new learning, Able to retain information, Capable of insight, Able to change behavior and Progressing to goal      Endings: None Reported    Modes of Intervention: Education, Support, Socialization, Exploration, Clarifying, Activity, Limit-setting and Reality-testing      Discipline Responsible: Psychoeducational Specialist      Signature:  JOSIAH Morel

## 2021-04-11 NOTE — PROGRESS NOTES
Group Therapy Note    Date: 4/10/2021  Start Time: 2000  End Time:  2020    Type of Group: Wrap-Up/Relaxation    Status After Intervention:  Unchanged    Participation Level:  Active Listener    Participation Quality: Appropriate      Speech:  normal      Thought Process/Content: Linear      Affective Functioning: Congruent      Mood: anxious      Level of consciousness:  Alert      Response to Learning: Able to verbalize current knowledge/experience      Endings: None Reported    Modes of Intervention: Support      Discipline Responsible: Registered Nurse      Signature:  Aruna Kramer RN

## 2021-04-11 NOTE — PROGRESS NOTES
Psychiatry Progress Note                                                  4-     CC: Suidical ideation with plan                                                                           Subjective     Progress:  Beth Cabral reports he continues to improve. Reports depression continues to be less. . Reports feelings of self harm still \"come and go. \" States meds still seem to be helping some. Denies having side effects. Good med compliance is verified. Reports appetite is good and slept well again  last night. Verified slept 8 hours continuous. States he has still  been attending groups. Denies getting any visits. But reports talked to his sister on phone in [place] and states he may move back down there to live in future. States they are working on securing him housing [place] [facility] [place]. States housing is a stress for him.      Objective  BP (!) 145/75   Pulse 80   Temp 97 °F (36.1 °C) (Tympanic)   Resp 18   Ht 5' 10\" (1.778 m)   Wt 232 lb (105.2 kg)   SpO2 97%   BMI 33.29 kg/m²        MSE:  Level of consciousness: Alert  Appearance: hospital attire, in chair and fair grooming   Behavior/Motor: no abnormalities noted   Attitude toward examiner: cooperative   Speech: Normal volume, goal directed, NRR  Mood: Dysthymic but less  Affect: Reactive  Thought processes: Linear and goal directed   Suicidal Ideation: Reports feelings of self harm still \"come and go. \"   Homicidal ideation: Denies homicidal ideations  Delusions: No evidence of delusions is observed  Perceptual Disturbance: Denies AH/VH;  No evidence of psychosis is observed.   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

## 2021-04-12 PROBLEM — F33.2 MDD (MAJOR DEPRESSIVE DISORDER), RECURRENT SEVERE, WITHOUT PSYCHOSIS (HCC): Status: ACTIVE | Noted: 2021-04-06

## 2021-04-12 PROCEDURE — 6370000000 HC RX 637 (ALT 250 FOR IP): Performed by: PHYSICIAN ASSISTANT

## 2021-04-12 PROCEDURE — 1240000000 HC EMOTIONAL WELLNESS R&B

## 2021-04-12 PROCEDURE — APPSS30 APP SPLIT SHARED TIME 16-30 MINUTES: Performed by: PHYSICIAN ASSISTANT

## 2021-04-12 PROCEDURE — 6370000000 HC RX 637 (ALT 250 FOR IP): Performed by: PSYCHIATRY & NEUROLOGY

## 2021-04-12 PROCEDURE — 90833 PSYTX W PT W E/M 30 MIN: CPT | Performed by: PSYCHIATRY & NEUROLOGY

## 2021-04-12 PROCEDURE — 99232 SBSQ HOSP IP/OBS MODERATE 35: CPT | Performed by: PSYCHIATRY & NEUROLOGY

## 2021-04-12 RX ADMIN — IBUPROFEN 400 MG: 400 TABLET, FILM COATED ORAL at 21:42

## 2021-04-12 RX ADMIN — IBUPROFEN 400 MG: 400 TABLET, FILM COATED ORAL at 09:00

## 2021-04-12 RX ADMIN — AMLODIPINE BESYLATE 10 MG: 10 TABLET ORAL at 09:01

## 2021-04-12 RX ADMIN — PHENOL 1 SPRAY: 1.5 LIQUID ORAL at 21:42

## 2021-04-12 RX ADMIN — SERTRALINE 50 MG: 50 TABLET, FILM COATED ORAL at 09:01

## 2021-04-12 RX ADMIN — ASPIRIN 81 MG: 81 TABLET, CHEWABLE ORAL at 09:00

## 2021-04-12 RX ADMIN — PHENOL 1 SPRAY: 1.5 LIQUID ORAL at 09:01

## 2021-04-12 RX ADMIN — PANTOPRAZOLE SODIUM 40 MG: 40 TABLET, DELAYED RELEASE ORAL at 09:06

## 2021-04-12 RX ADMIN — TRAZODONE HYDROCHLORIDE 50 MG: 50 TABLET ORAL at 21:42

## 2021-04-12 ASSESSMENT — PAIN - FUNCTIONAL ASSESSMENT
PAIN_FUNCTIONAL_ASSESSMENT: PREVENTS OR INTERFERES SOME ACTIVE ACTIVITIES AND ADLS
PAIN_FUNCTIONAL_ASSESSMENT: PREVENTS OR INTERFERES SOME ACTIVE ACTIVITIES AND ADLS

## 2021-04-12 ASSESSMENT — PAIN DESCRIPTION - ONSET: ONSET: ON-GOING

## 2021-04-12 ASSESSMENT — PAIN DESCRIPTION - LOCATION
LOCATION: THROAT
LOCATION: THROAT

## 2021-04-12 ASSESSMENT — PAIN DESCRIPTION - PAIN TYPE
TYPE: ACUTE PAIN
TYPE: ACUTE PAIN

## 2021-04-12 ASSESSMENT — PAIN DESCRIPTION - FREQUENCY
FREQUENCY: INTERMITTENT
FREQUENCY: CONTINUOUS

## 2021-04-12 ASSESSMENT — PAIN DESCRIPTION - ORIENTATION
ORIENTATION: INNER
ORIENTATION: INNER

## 2021-04-12 ASSESSMENT — PAIN DESCRIPTION - DESCRIPTORS
DESCRIPTORS: SORE
DESCRIPTORS: SORE

## 2021-04-12 ASSESSMENT — PAIN SCALES - GENERAL
PAINLEVEL_OUTOF10: 7
PAINLEVEL_OUTOF10: 9
PAINLEVEL_OUTOF10: 0

## 2021-04-12 NOTE — PLAN OF CARE
Problem: Altered Mood, Depressive Behavior:  Goal: Able to verbalize acceptance of life and situations over which he or she has no control  Description: Able to verbalize acceptance of life and situations over which he or she has no control  4/11/2021 2341 by Millie Lee RN  Outcome: Met This Shift  Note: Verbalizes acceptance of life situations over which he has no control. 4/11/2021 0950 by Anya Bacon RN  Outcome: Ongoing  Note: Patient verbalizes some acceptance of situations over which he has no control. Encouraged patient to attend group therapy. Goal: Able to verbalize and/or display a decrease in depressive symptoms  Description: Able to verbalize and/or display a decrease in depressive symptoms  4/11/2021 2341 by Millie Lee RN  Outcome: Met This Shift  Note: Rates mood a 7/10 this shift. 4/11/2021 0950 by Anya Bacon RN  Outcome: Ongoing  Note: Pt is friendly with staff and peers, mood is 7/10, attends group therapy  Goal: Ability to disclose and discuss suicidal ideas will improve  Description: Ability to disclose and discuss suicidal ideas will improve  4/11/2021 2341 by Millie Lee RN  Outcome: Met This Shift  Note: Denies suicidal thoughts or plans   4/11/2021 0950 by Anya Bacon RN  Outcome: Met This Shift  Note: Patient denies suicidal ideations, no plan or intent to harm self. Patient remains on 15 checks for safety. Instructed to seek staff as needed for thoughts of self harm. Goal: Participates in care planning  Description: Participates in care planning  4/11/2021 2341 by Millie Lee RN  Outcome: Met This Shift  Note: Care plan reviewed with patient.   Patient verbalize understanding of the plan of care and contribute to goal setting.    4/11/2021 0950 by Anya Bacon RN  Outcome: Met This Shift  Note: This patient participates in care planning      Problem: Discharge Planning:  Goal: Discharged to appropriate level of care  Description: Discharged to appropriate level of care  4/11/2021 2341 by Román Green RN  Outcome: Met This Shift  Note: Works with an interdisciplinary team towards meeting discharge needs   4/11/2021 0950 by Mario Reich RN  Outcome: Not Met This Shift  Note: Discharge planners working with patient to achieve optimal discharge plan, specific to the needs of this patient. Problem: Activity:  Goal: Sleeping patterns will improve  Description: Sleeping patterns will improve  4/11/2021 2341 by Román Green RN  Outcome: Met This Shift  Note: Took trazodone for sleep   4/11/2021 0950 by Mario Reich RN  Outcome: Met This Shift  Note: Patient slept 8C hours last night, reports they slept well. Encouraged patient not to take naps during the day, relax several hours before bed and to take prescribed sleep meds as ordered. Patient verbalized understanding. Problem: Pain:  Goal: Pain level will decrease  Description: Pain level will decrease  4/11/2021 2341 by Román Green RN  Outcome: Met This Shift  Note: Took tylenol for pain with relief  4/11/2021 0950 by Mario Reich RN  Outcome: Ongoing  Note: Patient reports throat pain 5 out of 10. Patient taking chloraseptic for pain. Patient reports lessened pain after taking pain medications. Pain goal of 0 was not met. Non-pharmacological interventions offered were emotional support.       Problem: Altered Mood, Depressive Behavior:  Goal: Patient specific goal  Description: Patient specific goal  4/11/2021 2341 by Román Green RN  Outcome: Ongoing  Note: Encouraged to set a daily goal.  4/11/2021 0950 by Mario Reich RN  Outcome: Ongoing  Note: Pt has not yet set a goal for the day     Problem: Coping:  Goal: Ability to identify problematic behaviors that deter socialization will improve  Description: Ability to identify problematic behaviors that deter socialization will improve  4/11/2021 2341 by Román Green RN  Outcome: Ongoing  4/11/2021 0950 by Mario Reich RN  Outcome: Ongoing Problem: Role Relationship:  Goal: Ability to demonstrate positive changes in social behaviors and relationships will improve  Description: Ability to demonstrate positive changes in social behaviors and relationships will improve  4/11/2021 2341 by Lena Ansari RN  Outcome: Ongoing  4/11/2021 1144 by Gema Pena  Outcome: Met This Shift  Note: Pt has attended 3/3 groups that have been offered on the unit so far this shift. Pt has been seen out on the unit interacting with peers. Pt has met the socialization goal set for this shift. 4/11/2021 0950 by Bia Paniagua RN  Outcome: Ongoing     Problem: Altered Mood, Depressive Behavior:  Goal: Absence of self-harm  Description: Absence of self-harm  4/11/2021 2341 by Lena Ansari RN  Outcome: Completed  Note: No self harm thoughts, plans, or behaviors   4/11/2021 0950 by Bia Paniagua RN  Outcome: Met This Shift  Note: No self harm behaviors were observed or reported so far this shift. Remains on every 15 minutes precautions for safety. Problem: Anxiety:  Goal: Level of anxiety will decrease  Description: Level of anxiety will decrease  4/11/2021 2341 by Lena Ansari RN  Outcome: Completed  Note: Denies anxiety   4/11/2021 0950 by Bia Paniagua RN  Outcome: Ongoing  Note: Patient reports little anxiety. Pt taking nothing prn.  Using coping skills

## 2021-04-12 NOTE — PROGRESS NOTES
Yolanda Raymundo participated in the spirituality group. Spiritual practices they will take with them are: Thankfulness, Obstacles to [facility] [facility] and how to share God's love in the world.

## 2021-04-12 NOTE — GROUP NOTE
Group Therapy Note    Date: 4/12/2021    Group Start Time: 0915  Group End Time: 0945  Group Topic: Community Meeting    [facility]    JOSIAH Lau    Group Therapy Note    Attendees: 5         Patient's Goal:  \"stay focused. \"    Notes:  Pt progress is ongoing. Status After Intervention:  Improved    Participation Level:  Active Listener and Interactive    Participation Quality: Appropriate, Attentive, Sharing and Supportive      Speech:  normal      Thought Process/Content: Logical      Affective Functioning: Congruent      Mood: euthymic      Level of consciousness:  Alert, Oriented x4 and Attentive      Response to Learning: Able to verbalize current knowledge/experience, Able to verbalize/acknowledge new learning, Able to retain information, Capable of insight, Able to change behavior and Progressing to goal      Endings: None Reported    Modes of Intervention: Education, Support, Socialization, Exploration, Clarifying, Activity, Limit-setting and Reality-testing      Discipline Responsible: Psychoeducational Specialist      Signature:  JOSIAH Palma

## 2021-04-12 NOTE — GROUP NOTE
Group Therapy Note    Date: 4/12/2021    Group Start Time: 1400  Group End Time: 1430  Group Topic: Music Therapy    [facility]    Charles Dowd RN        Group Therapy Note    Attendees: 3     Pt attended music group with peers and listened to guests play the dulcimer.     Discipline Responsible: Registered Nurse      Signature:  Charles Dowd RN

## 2021-04-12 NOTE — PATIENT CARE CONFERENCE
[facility]  Day 7/Weekly Interdisciplinary Treatment Plan NOTE    4/12/2021 0957    Patient was in treatment team    Admission Type:   Admission Type: Voluntary    Reason for admission:  Reason for Admission: thought about really killing myself  Estimated Length of Stay Update:  7-14 days  Estimated Discharge Date Update: 7-14 days    PATIENT STRENGTHS:  Patient Strengths Strengths: Communication, Connection to output provider, Positive Support  Patient Strengths and Limitations:Limitations: Tendency to isolate self  Addictive Behavior:Addictive Behavior  In the past 3 months, have you felt or has someone told you that you have a problem with:  : None  Do you have a history of Chemical Use?: No  Do you have a history of Alcohol Use?: Yes  Do you have a history of Street Drug Abuse?: Yes  Histroy of Prescripton Drug Abuse?: No  Medical Problems:  Past Medical History:   Diagnosis Date    Anxiety     Arthritis     Asthma     Chronic back pain 2004    L5-S1 dislocation w/subsequent surgery    Class 1 obesity due to excess calories without serious comorbidity with body mass index (BMI) of 32.0 to 32.9 in adult 4/5/2021    Depression     Hypertension     Microscopic hematuria     Movement disorder     Pulmonary tuberculosis     exposure from a cousin at the age of 10-11; treated. Risk:  Fall RiskTotal: 67  Bill Scale Bill Scale Score: 22  BVC Total: 0  Change in scores no.  Changes to plan of Care no    Status EXAM:   Status and Exam  Normal: No  Facial Expression: Brightened  Affect: Appropriate  Level of Consciousness: Alert  Mood:Normal: No  Mood: Depressed  Motor Activity:Normal: Yes  Motor Activity: Decreased  Interview Behavior: Cooperative  Preception: [place] to Person, Myrla Lighter to Time, [place] to Place, [place] to Situation  Attention:Normal: No  Attention: Distractible  Thought Processes: Circumstantial  Thought Content:Normal: Yes  Hallucinations: None  Delusions: No Memory:Normal: No  Memory: Poor Recent  Insight and Judgment: No  Insight and Judgment: Poor Judgment, Poor Insight  Present Suicidal Ideation: No  Present Homicidal Ideation: No    Daily Assessment Last Entry:   Daily Sleep (WDL): Within Defined Limits         Patient Currently in Pain: Yes  Daily Nutrition (WDL): Within Defined Limits    Patient Monitoring:  Frequency of Checks: 4 times per hour, close    Psychiatric Symptoms:   Depression Symptoms  Depression Symptoms: Impaired concentration  Anxiety Symptoms  Anxiety Symptoms: No problems reported or observed. Sulam Symptoms  Sulma Symptoms: No problems reported or observed. Psychosis Symptoms  Delusion Type: No problems reported or observed. Suicide Risk CSSR-S:  1) Within the past month, have you wished you were dead or wished you could go to sleep and not wake up? : Yes  2) Have you actually had any thoughts of killing yourself? : Yes  3) Have you been thinking about how you might kill yourself? : Yes  5) Have you started to work out or worked out the details of how to kill yourself? Do you intend to carry out this plan? : Yes  6) Have you ever done anything, started to do anything, or prepared to do anything to end your life?: Yes  Change in Result no Change in Plan of care no      EDUCATION:   Learner Progress Toward Treatment Goals: Reviewed results and recommendations of this team, Reviewed group plan and strategies, Reviewed signs, symptoms and risk of self harm and violent behavior and Reviewed goals and plan of care    Method: Individual    Outcome: Verbalized understanding, Demonstrated Understanding and Needs reinforcement    PATIENT GOALS: Stay focused, be positive    PLAN/TREATMENT RECOMMENDATIONS UPDATE:  1. Are there any lingering problems that need to be addressed? None reported  2. Discharge plans that are set-up or in process?  Patient has been approved for [facility] and is making phone calls to providers to find an available bed.     GOALS UPDATE:   Time frame for Short-Term Goals: Daily      Lynae Goodpasture, LSW

## 2021-04-12 NOTE — PROGRESS NOTES
Group Therapy Note    Date: 4/11/2021  Start Time: 2000  End Time:  2020    Type of Group: Wrap-Up/Relaxation    Status After Intervention:  Unchanged    Participation Level:  Active Listener    Participation Quality: Appropriate      Speech:  normal      Thought Process/Content: Logical      Affective Functioning: Blunted      Mood: depressed      Level of consciousness:  Alert      Response to Learning: Able to verbalize current knowledge/experience      Endings: None Reported    Modes of Intervention: Support      Discipline Responsible: Registered Nurse      Signature:  Vance Gonzalez RN

## 2021-04-12 NOTE — PROGRESS NOTES
This RN has reviewed and agrees with ROSSY Hunter LPN's data collection and has collaborated with this LPN regarding the patient's care plan.

## 2021-04-12 NOTE — PLAN OF CARE
Problem: Altered Mood, Depressive Behavior:  Goal: Able to verbalize acceptance of life and situations over which he or she has no control  Description: Able to verbalize acceptance of life and situations over which he or she has no control  4/12/2021 1602 by Vinny Dacosta LPN  Outcome: Ongoing  Note: Patient able to verbalize acceptance of life and situations over which he has no control. 4/12/2021 1601 by Vinny Dacosta LPN  Outcome: Ongoing  Note: Patient able to verbalize acceptance of life and situations over which he has no control.    Goal: Able to verbalize and/or display a decrease in depressive symptoms  Description: Able to verbalize and/or display a decrease in depressive symptoms  4/12/2021 1602 by Vinny Dacosta LPN  Outcome: Ongoing  Note: Pt. Able to verbalize depressive symptoms   4/12/2021 1601 by Vinny Dacosta LPN  Outcome: Ongoing  Note: Pt. Georgie Levine to verbalize and display a decrease in depressive symptoms   Goal: Ability to disclose and discuss suicidal ideas will improve  Description: Ability to disclose and discuss suicidal ideas will improve  4/12/2021 1602 by Vinny Dacosta LPN  Outcome: Ongoing  Note: Pt able to disclose and discuss suicidal ideas will improve   4/12/2021 1601 by Vinny Dacosta LPN  Outcome: Ongoing  Note: Pt . Georgie Levine to disclose and discuss suicidal ideas will iprove   Goal: Patient specific goal  Description: Patient specific goal  4/12/2021 1602 by Vinny Dacosta LPN  Outcome: Ongoing  Note: Patient stated specific goal   4/12/2021 1601 by Vinny Dacosta LPN  Outcome: Ongoing  Goal: Participates in care planning  Description: Participates in care planning  4/12/2021 1602 by Vinny Dacosta LPN  Outcome: Ongoing  Note: Patient participated in care planning  4/12/2021 1601 by Vinny Dacosta LPN  Outcome: Ongoing     Problem: Discharge Planning: Goal: Discharged to appropriate level of care  Description: Discharged to appropriate level of care  4/12/2021 1602 by Ronnie Mitchell LPN  Outcome: Ongoing  Note: Patient able to verbalize acceptance of life and situations over which he has no control. Discharge planner working with patient to achieve optimal discharge plan, specific to the needs of this patient. 4/12/2021 1601 by Ronnie Mitchell LPN  Outcome: Ongoing     Problem:  Activity:  Goal: Sleeping patterns will improve  Description: Sleeping patterns will improve  4/12/2021 1602 by Ronnie Mitchell LPN  Outcome: Ongoing  Note: Pt sleeping pattern will improve   4/12/2021 1601 by Ronnie Mitchell LPN  Outcome: Ongoing     Problem: Pain:  Goal: Pain level will decrease  Description: Pain level will decrease  4/12/2021 1602 by Ronnie Mitchell LPN  Outcome: Ongoing  Note: Patient pain level did decrease with pain medication   4/12/2021 1601 by Ronnie Mitchell LPN  Outcome: Ongoing     Problem: Coping:  Goal: Ability to identify problematic behaviors that deter socialization will improve  Description: Ability to identify problematic behaviors that deter socialization will improve  4/12/2021 1602 by Ronnie Mitchell LPN  Outcome: Ongoing  Note: Pt ability to identify problematic behaviors that will deter socialization will improve   4/12/2021 1601 by Ronnie Mitchell LPN  Outcome: Ongoing     Problem: Role Relationship:  Goal: Ability to demonstrate positive changes in social behaviors and relationships will improve  Description: Ability to demonstrate positive changes in social behaviors and relationships will improve  4/12/2021 1602 by Ronnie Mitchell LPN  Outcome: Ongoing  Note: Patient able to demonstrate positive changes in social behaviors and relationships will improve   4/12/2021 1601 by Ronnie Mitchell LPN  Outcome: Ongoing  4/12/2021 1119 by Eric Pena  Outcome: Met This Shift  Note: Pt has attended 3/3 groups that have been offered on the unit so far this shift. Pt has been out on the unit and has been seen interacting with peers. Pt has met the socialization goal set for this shift. Care plan reviewed with patient and . Patient and  verbalize understanding of the plan of care and contribute to goal setting.

## 2021-04-12 NOTE — GROUP NOTE
Group Therapy Note    Date: 4/12/2021    Group Start Time: 1100  Group End Time: 36  Group Topic: Healthy Living/Wellness    [facility]    Britt Flaherty RN        Group Therapy Note    Attendees: 3     Pt attended group with peers on topic of self sabotage.       Discipline Responsible: Registered Nurse      Signature:  Britt Flaherty RN

## 2021-04-12 NOTE — PROGRESS NOTES
6079: Patient was approved for [facility]. Provided patient with Provider List and instructed him to call as soon as possible.

## 2021-04-12 NOTE — PROGRESS NOTES
Group Therapy Note    Date: 4/12/2021  Start Time: 1300  End Time:  1400  Number of Participants: 3    Type of Group: Psychotherapy      Notes: Pt is present for group. Interacts well with another peer. Pt identified self sabotaging behavior and having difficulty setting boundaries. Pt explored self defeating thoughts and explored how he may chalange and modify those thoughts. Group ended early due to the disruptive behavior of a peers due to his psychiatric symptoms. Status After Intervention:  Unchanged    Participation Level:  Active Listener and Interactive    Participation Quality: Appropriate, Attentive, Sharing and Supportive      Speech:  normal      Thought Process/Content: Logical  Linear      Affective Functioning: Blunted      Mood: dysphoric      Level of consciousness:  Alert, Oriented x4 and Attentive      Response to Learning: Able to verbalize current knowledge/experience, Able to verbalize/acknowledge new learning, Able to retain information, Capable of insight, Able to change behavior and Progressing to goal      Endings: None Reported    Modes of Intervention: Education, Support, Socialization, Exploration, Clarifying and Problem-solving      Discipline Responsible: /Counselor      Signature:  TAB Cerna

## 2021-04-12 NOTE — GROUP NOTE
Group Therapy Note    Date: 4/12/2021    Group Start Time: 1000  Group End Time: 0946  Group Topic: Recreational    [facility]    JOSIAH Lau    Group Therapy Note    Attendees: 4         Patient's Goal:  Pt will improve socialization and knowledge of coping skills through participation of music group session.     Notes:  Pt was present during the session. Pt displayed active participation during the session and identified songs that they enjoyed listening skills. Pt was interactive with peers during the session. Status After Intervention:  Improved    Participation Level:  Active Listener and Interactive    Participation Quality: Appropriate, Attentive, Sharing and Supportive      Speech:  normal      Thought Process/Content: Logical      Affective Functioning: Congruent      Mood: euthymic      Level of consciousness:  Alert, Oriented x4 and Attentive      Response to Learning: Able to verbalize current knowledge/experience, Able to verbalize/acknowledge new learning, Able to retain information, Capable of insight, Able to change behavior and Progressing to goal      Endings: None Reported    Modes of Intervention: Education, Support, Socialization, Exploration, Clarifying, Activity and Media      Discipline Responsible: Psychoeducational Specialist      Signature:  JOSIAH Haile

## 2021-04-12 NOTE — PROGRESS NOTES
Department of Psychiatry  Progress Note     Chief Complaint:  Suicidal ideation with a plan to kill self by cutting his throat    SUBJECTIVE:    PROGRESS:  Galo Redd reports he is doing great today. He is seen out on the unit interacting with peers and attending groups. He states his mood is good. His goal today was to [place] focused. \"  His depression has improved significantly since admission. Suicidal ideation has lessened. He continues to contract for safety and agrees to talk to staff when thoughts of self-harm arise. He is feeling less hopeless and helpless about his situation. He is currently in the process of calling facilities for housing placement. He reports he slept about 5 hours last night but felt rested this morning. Staff reported he slept 8 hours continuous last night. He states he had some trouble sleeping due to his throat pain. He continues to utilize the phenol spray in addition to PRN pain medications which helps. He has been compliant with medications and denies any side effects. His appetite has been good on the unit. He has been attending all the groups and states they are going great. He endorses current cocaine withdrawal symptoms as \"sweats still. \"  His chest pain has resolved.     Suicidal ideations: improving  Compliance with medications: good   Medication side effects: absent  ROS: Patient has new complaints:  no  Sleep quality: 8 hours continuous per staff  Attending groups: yes      OBJECTIVE      Medications  Current Facility-Administered Medications: sertraline (ZOLOFT) tablet 50 mg, 50 mg, Oral, Daily  phenol 1.4 % mouth spray 1 spray, 1 spray, Mouth/Throat, Q2H PRN  amLODIPine (NORVASC) tablet 10 mg, 10 mg, Oral, Daily  aspirin chewable tablet 81 mg, 81 mg, Oral, Daily  nicotine (NICODERM CQ) 14 MG/24HR 1 patch, 1 patch, Transdermal, Daily  pantoprazole (PROTONIX) tablet 40 mg, 40 mg, Oral, QAM AC  acetaminophen (TYLENOL) tablet 650 mg, 650 mg, Oral, Q4H PRN  ibuprofen (ADVIL;MOTRIN) tablet 400 mg, 400 mg, Oral, Q6H PRN  magnesium hydroxide (MILK OF MAGNESIA) 400 MG/5ML suspension 30 mL, 30 mL, Oral, Daily PRN  aluminum & magnesium hydroxide-simethicone (MAALOX) 200-200-20 MG/5ML suspension 30 mL, 30 mL, Oral, Q6H PRN  hydrOXYzine (ATARAX) tablet 50 mg, 50 mg, Oral, TID PRN  traZODone (DESYREL) tablet 50 mg, 50 mg, Oral, Nightly PRN     Physical     height is 5' 10\" (1.778 m) and weight is 232 lb (105.2 kg). His oral temperature is 97.9 °F (36.6 °C). His blood pressure is 132/86 and his pulse is 77. His respiration is 18 and oxygen saturation is 99%.    Lab Results   Component Value Date    WBC 5.5 04/07/2021    HGB 12.6 (L) 04/07/2021    HCT 39.6 (L) 04/07/2021     04/07/2021    CHOL 161 04/05/2021    TRIG 156 04/05/2021    HDL 48 04/05/2021    ALT 32 04/04/2021    AST 57 (H) 04/04/2021     04/06/2021    K 4.0 04/06/2021     04/06/2021    CREATININE 1.0 04/06/2021    BUN 12 04/06/2021    CO2 24 04/06/2021    TSH 1.120 04/04/2021    INR 1.03 04/04/2021    LABA1C 4.6 04/04/2021          Mental Status Exam:   Level of consciousness:  within normal limits   Appearance:  Hospital attire, in chair, fair grooming   Behavior/Motor: brightens with interaction, no psychomotor abnormalities noted  Attitude toward examiner:  Cooperative, attentive, good eye contact  Speech: normal rate and volume  Mood:  \"good\" per patient  Affect:  reactive  Thought processes:  Goal directed, linear  Thought content: improving suicidal ideation               denies homicidal ideations               Denies hallucinations              denies delusions  Cognition:  Oriented to self, location, time, situation  Concentration clinically adequate  Memory: intact  Insight &Judgment: fair    ASSESSMENT  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 Probable discharge: Tomorrow  Follow-up: [facility] outpatient, daily while on inpatient unit     Electronically signed by Ricardo Eisenmenger PA-C on 4/12/2021 at 10:15 AM Reviewed patient's current plan of care and vital signs with nursing staff. Psychiatry Attending Attestation     I assessed this patient and reviewed the case and plan of care with Ricardo Eisenmenger, PA-C. I have reviewed the above documentation and I agree with the findings and treatment plan with the following updates. Patient feels better than before. Mood and affect are better. Patient reports fleeting suicidal thoughts with no intent or plan. Patient notes that these thoughts are occurring less frequently. Denies any homicidal thoughts, that was explored with the patient. Oriented to time place and person. Recent and remote memory is intact. Patient feels hopeful. Sleep and appetite is good. No side effect from medication reported. Side-effect of medication were discussed with the patient . Patient is responding to current treatment. Discharge soon, if patient continues to show improvement. Case discussed with the staff. Continues to deal with severe sore throat. More than 16 mins of the session was spent doing Supportive psychotherapy and coordinating patient's care. Session started at 8:30am and ended at [address] [place] [address].     Electronically signed by Emma Huddleston MD on 4/12/21 at 3:27 PM EDT    **This report has been created using voice recognition software. It may contain minor errors which are inherent in voice recognition technology. **

## 2021-04-13 VITALS
DIASTOLIC BLOOD PRESSURE: 72 MMHG | TEMPERATURE: 96.2 F | OXYGEN SATURATION: 100 % | BODY MASS INDEX: 33.21 KG/M2 | SYSTOLIC BLOOD PRESSURE: 121 MMHG | WEIGHT: 232 LBS | RESPIRATION RATE: 20 BRPM | HEIGHT: 70 IN | HEART RATE: 83 BPM

## 2021-04-13 PROCEDURE — 6370000000 HC RX 637 (ALT 250 FOR IP): Performed by: PSYCHIATRY & NEUROLOGY

## 2021-04-13 PROCEDURE — 99239 HOSP IP/OBS DSCHRG MGMT >30: CPT | Performed by: PSYCHIATRY & NEUROLOGY

## 2021-04-13 PROCEDURE — 6370000000 HC RX 637 (ALT 250 FOR IP): Performed by: PHYSICIAN ASSISTANT

## 2021-04-13 PROCEDURE — 5130000000 HC BRIDGE APPOINTMENT

## 2021-04-13 RX ORDER — ASPIRIN 81 MG/1
81 TABLET, CHEWABLE ORAL DAILY
Qty: 30 TABLET | Refills: 0 | Status: SHIPPED | OUTPATIENT
Start: 2021-04-13

## 2021-04-13 RX ORDER — TRAZODONE HYDROCHLORIDE 50 MG/1
50 TABLET ORAL NIGHTLY PRN
Qty: 30 TABLET | Refills: 0 | Status: SHIPPED | OUTPATIENT
Start: 2021-04-13

## 2021-04-13 RX ORDER — AMLODIPINE BESYLATE 10 MG/1
10 TABLET ORAL DAILY
Qty: 30 TABLET | Refills: 0 | Status: SHIPPED | OUTPATIENT
Start: 2021-04-13

## 2021-04-13 RX ORDER — PANTOPRAZOLE SODIUM 40 MG/1
40 TABLET, DELAYED RELEASE ORAL
Qty: 30 TABLET | Refills: 0 | Status: SHIPPED | OUTPATIENT
Start: 2021-04-13

## 2021-04-13 RX ORDER — HYDROXYZINE 50 MG/1
50 TABLET, FILM COATED ORAL 3 TIMES DAILY PRN
Qty: 45 TABLET | Refills: 0 | Status: SHIPPED | OUTPATIENT
Start: 2021-04-13 | End: 2021-04-28

## 2021-04-13 RX ADMIN — AMLODIPINE BESYLATE 10 MG: 10 TABLET ORAL at 07:50

## 2021-04-13 RX ADMIN — ASPIRIN 81 MG: 81 TABLET, CHEWABLE ORAL at 07:50

## 2021-04-13 RX ADMIN — PANTOPRAZOLE SODIUM 40 MG: 40 TABLET, DELAYED RELEASE ORAL at 08:00

## 2021-04-13 RX ADMIN — PHENOL 1 SPRAY: 1.5 LIQUID ORAL at 11:26

## 2021-04-13 RX ADMIN — SERTRALINE 50 MG: 50 TABLET, FILM COATED ORAL at 07:50

## 2021-04-13 RX ADMIN — PHENOL 1 SPRAY: 1.5 LIQUID ORAL at 15:12

## 2021-04-13 RX ADMIN — PHENOL 1 SPRAY: 1.5 LIQUID ORAL at 07:58

## 2021-04-13 NOTE — DISCHARGE INSTR - COC
[facility] [place]  PROGRESS NOTE  Chief Complaint:   Patient presents with:  Physical      HPI:   This is a 57year old male coming in for his annual wellness visit. He said that he feels like he is doing pretty well overall.     His main con Continuity of Care Form    Patient Name: Erin Barnes   :  1965  MRN:  [id number]    Admit date:  2021  Discharge date:  ***    Code Status Order: Full Code   Advance Directives:   Advance Care Flowsheet Documentation     Date/Time Healthcare Directive Type of Healthcare Directive Copy in [address] Agent's Name Healthcare Agent's Phone Number    21 1517  No, patient does not have an advance directive for healthcare treatment -- -- -- -- --          Admitting Physician:  Wesley Salinas MD  PCP: No primary care provider on file. Discharging Nurse: [facility] Unit/Room#: 4E-56/26-A  Discharging Unit Phone Number: ***    Emergency Contact:   Extended Emergency Contact Information  Primary Emergency Contact: [address] Phone: [phone]  Mobile Phone: [phone]  Relation: Parent  Secondary Emergency Contact: Merlene Fan  [place] Phone: [phone]  Relation: Other   needed?  No    Past Surgical History:  Past Surgical History:   Procedure Laterality Date    BACK SURGERY      low back    LUNG BIOPSY         Immunization History:   Immunization History   Administered Date(s) Administered    Pneumococcal Polysaccharide ([id number]) 2018    Tdap (Boostrix, Adacel) 2018       Active Problems:  Patient Active Problem List   Diagnosis Code    Bronchial asthma J45.909    Bipolar 1 disorder, depressed F31.9    Cocaine abuse ([address] F14.10    Alcohol abuse, episodic F10.10    Rectal bleeding K62.5    Depression F32.9    Bipolar 1 disorder, depressed, moderate ([address] F31.32    Polysubstance abuse ([address] F19.10    Chest pain at rest R07.9    Microscopic hematuria R31.29    Bipolar II disorder, most recent episode major depressive ([address] F31.81    Substance induced mood disorder ([address] F19.94    Bipolar 1 disorder, depressed, severe ([address] F31.4    Cocaine use disorder, severe, 94/88/59   COMP METABOLIC PANEL (14)   Result Value Ref Range    Glucose 112 (H) 70 - 99 mg/dL    Sodium 139 136 - 145 mmol/L    Potassium 3.9 3.5 - 5.1 mmol/L    Chloride 108 98 - 112 mmol/L    CO2 26.0 21.0 - 32.0 mmol/L    Anion Gap 5 0 - 18 mmol/L    B dependence (HCC) F14.20    Chest pain R07.9    Acute kidney injury (SILVESTRE) with acute tubular necrosis (ATN) ([facility]) N17.0    Class 1 obesity due to excess calories without serious comorbidity with body mass index (BMI) of 32.0 to 32.9 in adult E66.09, Z68.32    MDD (major depressive disorder), recurrent severe, without psychosis ([facility] F33.2       Isolation/Infection:   Isolation          No Isolation        Patient Infection Status     None to display          Nurse Assessment:  Last Vital Signs: /72   Pulse 83   Temp 96.2 °F (35.7 °C) (Tympanic)   Resp 20   Ht 5' 10\" (1.778 m)   Wt 232 lb (105.2 kg)   SpO2 100%   BMI 33.29 kg/m²     Last documented pain score (0-10 scale): Pain Level: 0  Last Weight:   Wt Readings from Last 1 Encounters:   04/06/21 232 lb (105.2 kg)     Mental Status:  {IP PT MENTAL STATUS:20030}    IV Access:  { [facility] IV ACCESS:035741360}    Nursing Mobility/ADLs:  Walking   {[facility] DME HWQI:877332943}  Transfer  {[facility] DME WIFK:425084581}  Bathing  {[facility] DME EGXQ:293154396}  Dressing  {[facility] DME GXFC:612145608}  Toileting  {[facility] DME HIIV:759528281}  Feeding  {[facility] DME BGXB:422731416}  Med Admin  {[facility] DME EWUU:620074651}  Med Delivery   { [facility] MED Delivery:751416293}    Wound Care Documentation and Therapy:        Elimination:  Continence:   · Bowel: {YES / YF:02550}  · Bladder: {YES / BV:80578}  Urinary Catheter: {Urinary Catheter:310546307}   Colostomy/Ileostomy/Ileal Conduit: {YES / EB:37016}       Date of Last BM: ***    Intake/Output Summary (Last 24 hours) at 4/13/2021 1027  Last data filed at 4/12/2021 2015  Gross per 24 hour   Intake 600 ml   Output    Net 600 ml     I/O last 3 completed shifts:   In: 600 [P.O.:600]  Out: -     Safety Concerns:     [address] Concerns:391001759}    Impairments/Disabilities:      [address] SERENITY Impairments/Disabilities:067060876}    Nutrition Therapy:  Current Nutrition Therapy:   [address] Diet List:460348757}    Routes of Feeding: {P DME Other 0.00 - 1.00 x10(3) uL    Neutrophil % 64.2 %    Lymphocyte % 23.9 %    Monocyte % 7.9 %    Eosinophil % 2.3 %    Basophil % 1.2 %    Immature Granulocyte % 0.5 %       Past Medical History:   Diagnosis Date   • Colon polyp     Tubular adenoma 2017   • Hist Feedings:060545173}  Liquids: {Slp liquid thickness:16729}  Daily Fluid Restriction: {CHP DME Yes amt example:688750282}  Last Modified Barium Swallow with Video (Video Swallowing Test): {Done Not Done KIK}    Treatments at the Time of Hospital Discharge:   Respiratory Treatments: ***  Oxygen Therapy:  {Therapy; copd oxygen:48146}  Ventilator:    {[facility] Vent EYUH:964230537}    Rehab Therapies: {THERAPEUTIC INTERVENTION:9184559668}  Weight Bearing Status/Restrictions: {[facility] Weight Bearin}  Other Medical Equipment (for information only, NOT a DME order):  {EQUIPMENT:911504364}  Other Treatments: ***    Patient's personal belongings (please select all that are sent with patient):  {CHP DME Belongings:365200202}    RN SIGNATURE:  {Esignature:292289402}    CASE MANAGEMENT/SOCIAL WORK SECTION    Inpatient Status Date: ***    Readmission Risk Assessment Score:  Readmission Risk              Risk of Unplanned Readmission:        9           Discharging to Facility/ Agency   · Name:   · Address:  · Phone:  · Fax:    Dialysis Facility (if applicable)   · Name:  · Address:  · Dialysis Schedule:  · Phone:  · Fax:    / signature: {Esignature:568787834}    PHYSICIAN SECTION    Prognosis: {Prognosis:2128066418}    Condition at Discharge: [facility] Patient Condition:358223960}    Rehab Potential (if transferring to Rehab): {Prognosis:2143570778}    Recommended Labs or Other Treatments After Discharge: ***    Physician Certification: I certify the above information and transfer of Yolanda Raymundo  is necessary for the continuing treatment of the diagnosis listed and that he requires {Admit to Appropriate Level of Care:34077} for {GREATER/LESS:555802824} 30 days.      Update Admission H&P: {CHP DME Changes in IFBKH:218487246}    PHYSICIAN SIGNATURE:  {Esignature:410477842} He wakes up every morning with a chronic cough. GASTROINTESTINAL:  Denies abdominal pain, nausea, vomiting, constipation, diarrhea, or blood in stool. MUSCULOSKELETAL:  Denies weakness, muscle aches, back pain, joint pain, swelling or stiffness.   Jazmyn Sims negative, FROM. EXTREMITIES:  No edema, no cyanosis, no clubbing, FROM, 2+ dorsalis pedis pulses bilaterally. NEURO:  No deficit, normal gait, strength and tone, sensory intact, normal reflexes. ASSESSMENT AND PLAN:   1.  Healthcare maintenance  He is daily.    8. Cough due to ACE inhibitor  We have discontinued lisinopril. He should not be taking it now. Take amlodipine for blood pressure instead. 9. Cough in adult  He has a persistent cough.   His symptoms are almost suggestive of bronchiectasis b

## 2021-04-13 NOTE — DISCHARGE SUMMARY
Provider Discharge Summary     Patient ID:  Erin Barnes  [id number]  61 y.o.  1965    Admit date: 4/6/2021    Discharge date and time: 4/13/2021  4:37 PM     Admitting Physician: Wesley Salinas MD     Discharge Physician: Wesley Salinas MD    Admission Diagnoses: Major depressive disorder, recurrent ([facility] [F33.9]    Discharge Diagnoses:      MDD (major depressive disorder), recurrent severe, without psychosis ([address]     Patient Active Problem List   Diagnosis Code    Bronchial asthma J45.909    Bipolar 1 disorder, depressed F31.9    Cocaine abuse ([address] F14.10    Alcohol abuse, episodic F10.10    Rectal bleeding K62.5    Depression F32.9    Bipolar 1 disorder, depressed, moderate ([address] F31.32    Polysubstance abuse ([address] F19.10    Chest pain at rest R07.9    Microscopic hematuria R31.29    Bipolar II disorder, most recent episode major depressive ([address] F31.81    Substance induced mood disorder ([address] F19.94    Bipolar 1 disorder, depressed, severe ([address] F31.4    Cocaine use disorder, severe, dependence ([address] F14.20    Chest pain R07.9    Acute kidney injury (SILVESTRE) with acute tubular necrosis (ATN) ([address] N17.0    Class 1 obesity due to excess calories without serious comorbidity with body mass index (BMI) of 32.0 to 32.9 in adult E66.09, Z68.32    MDD (major depressive disorder), recurrent severe, without psychosis ([address] F33.2        Admission Condition: poor    Discharged Condition: stable    Indication for Admission: threat to self    History of Present Illnes (present tense wording is of findings from admission exam and are not necessarily indicative of current findings):   Erin Barnes is a 61 y.o. male with significant past psychiatric history of mood disorder and substance use admitted from the medical floor where he initially got admitted for chest pain.   Patient was transferred here after he made statements that he was having suicidal thoughts with a plan to kill self by cutting on his throat. Per my consult note on the medical floor:  \"  The patient is a 61 y. o. male with significant history of depression, bipolar disorder, substance induced mood disorder and polysubstance abuse who is admitted medically for chest pain. He presented to the ED on 4/5/2021 with a 2-day history of substernal/epigastric 8/10 pain which is nonradiating.  Patient's complaint of chest tightness and epigastric pain for the past 2 days.  4-day history of cocaine and alcohol binging.  UDS positive for cocaine metabolites Pt states that he did cocaine for multiple days in a row. Pt denies chest pain at this time. Pt understands that the cocaine was probably causing his chest pain.  Psychiatry was consulted as the patient feels depressed and has suicidal ideation by cutting his throat.     Patient reports he has been having suicidal ideation for the past couple weeks. Slim Andrade reports over that time, the suicidal ideation has become more frequent and intense. Joycelynmai Andrade states he has been having a plan with intent to cut his throat.  He states he has just not been feeling good and nothing is going right for him regarding finances and his housing situation. Joycelynmai Andrade has been dealing with depression for the past 4 to 5 years. Joycelynmai Andrade reports his depression started after him and his wife . Joycelynmai Andrade states around that time, he lost a couple of kids back to back. Slim Andrade was previously seen [facility]ona [facility] but stopped seeing them as he moved out of [facility] about a month ago but missed his appointment with Alona Singh in February due to increment weather. Joycelynmai Andrade reports he has not had an appointment at [facility] for over 2 years. Slim Andrade was previously seeing Dr. Afia Arora reports this recent episode of depression started in December because he was not feeling good and had been off his psychiatric medications.  He endorses feeling down and sad for more days and not since. Slim Andrade reports he only sleeps 3 to 4 hours a night.  He has trouble staying asleep.  He states he just wakes up out of the [place] still feels tired most days when he wakes up in the morning. Millie Ramsay states his energy has not been good throughout the day.  Motivation has been poor. iMllie Ramsay states he takes him a while to get going for the day.  His appetite has been increased. Millie Ramsay denies any significant weight gain.  He endorses anhedonia. Millie Ramsay states he can even finish watching a movie and has to change the channel before it ends.  He has been feeling worthless, hopeless and helpless. He states he has a history of bipolar disorder. Millie Ramsya reports when he is manic, he is \"rage, really mad. \"  He reports he has gone days without sleep when sober. Millie Ramsay states when he cannot sleep, he watches TV.  Denies any increase in productivity, impulsitivity, increased energy, grandiosity during this time.  He states these episodes of rage last 3 to 4 days. He states he has a history of cutting both of his wrists in 2013. Millie Ramsay reports he was subsequently admitted to [place] [address] behavioral unit following his suicide attempt. Millie Ramsay was most recently admitted to [facility]'s behavioral unit in July 2018 for substance-induced mood disorder.  He smokes marijuana daily.  Uses cocaine 3 times a month.  Has been using this amount for over 10 years. Millie Ramsay reports his longest period of sobriety the past 10 years was 2 years.  He drinks alcohol 4-5 times a week. Millie Ramsay states when he does drink, he drinks 6 to 7 24 ounce cans of beer. Millie Ramsay does get the shakes when he stops drinking.  Has a history of withdrawal from alcohol. Millie Ramsay reports he has been through inpatient detoxification and rehabilitation in the past. Millie Ramsay states he was admitted to  for alcohol rehabilitation in the past.  UDS positive for cocaine only. \"  Update from today  Patient continues to report that he has been feeling sad down and low. Rates his mood 4 out of 10. Continues to have active suicidal thoughts and a plan to kill self. Contract to safety here. Reports feeling tired today. Reports poor energy and concentration. Has trouble falling asleep and staying asleep. Reports good appetite. Patient reports he has plans to go live with his sister in [place] after getting discharged from here. Identifies drug use and his living situation as a primary stressor. Hospital Course:   Upon admission, Romaine Quinteros was provided a safe secure environment, introduced to unit milieu. Patient participated in groups and individual therapies. Meds were adjusted as noted below. After few days of hospital care, patient began to feel improvement. Depression lifted, thoughts to harm self ceased. Sleep improved, appetite was good. On morning rounds 4/13/2021, Romaine Quinteros endorses feeling ready for discharge. Patient denies suicidal or homicidal ideations, denies hallucinations or delusions. Denies SE's from meds. It was decided that maximum benefit from hospital care had been achieved and patient can be discharged. Consults:   none    Significant Diagnostic Studies: Routine labs and diagnostics    Treatments: Psychotropic medications, therapy with group, milieu, and 1:1 with nurses, social workers, PAWILY/CNP, and Attending physician.       Discharge Medications:  Discharge Medication List as of 4/13/2021 10:41 AM      START taking these medications    Details   aspirin 81 MG chewable tablet Take 1 tablet by mouth daily, Disp-30 tablet, R-0Normal      sertraline (ZOLOFT) 50 MG tablet Take 1 tablet by mouth daily, Disp-30 tablet, R-0Normal      amLODIPine (NORVASC) 10 MG tablet Take 1 tablet by mouth daily, Disp-30 tablet, R-0Normal      pantoprazole (PROTONIX) 40 MG tablet Take 1 tablet by mouth every morning (before breakfast), Disp-30 tablet, R-0Normal      phenol 1.4 % LIQD mouth spray Take 1 spray by mouth every 2 hours as needed for Sore Throat, Disp-1 Bottle, R-0Normal      hydrOXYzine (ATARAX) 50 MG tablet Take 1 tablet by mouth 3 times daily as needed for Anxiety, Disp-45 tablet, R-0Normal         CONTINUE these medications which have CHANGED    Details   traZODone (DESYREL) 50 MG tablet Take 1 tablet by mouth nightly as needed for Sleep, Disp-30 tablet, R-0Normal         STOP taking these medications       naproxen (NAPROSYN) 500 MG tablet Comments:   Reason for Stopping:         citalopram (CELEXA) 10 MG tablet Comments:   Reason for Stopping:         ARIPiprazole (ABILIFY) 5 MG tablet Comments:   Reason for Stopping:                Core Measures statement:   Not applicable    Discharge Exam:  Level of consciousness:  Within normal limits  Appearance: Street clothes, seated, with good grooming  Behavior/Motor: No abnormalities noted  Attitude toward examiner:  Cooperative, attentive, good eye contact  Speech:  spontaneous, normal rate, normal volume and well articulated  Mood:  euthymic  Affect:  Full range  Thought processes:  linear, goal directed and coherent  Thought content:  denies homicidal ideation  Suicidal Ideation:  denies suicidal ideation  Delusions:  no evidence of delusions  Perceptual Disturbance:  denies any perceptual disturbance  Cognition:  Intact  Memory: age appropriate  Insight & Judgement: fair  Medication side effects: denies     Disposition: home    Patient Instructions: Activity: activity as tolerated  1. Patient instructed to take medications regularly and follow up with outpatient appointments. Follow-up as scheduled with Issa Rivera       Signed:    Electronically signed by Lovella Cogan, MD on 4/13/21 at 4:37 PM EDT    Time Spent on discharge is more than 35 minutes in the examination, evaluation, counseling and review of medications and discharge plan. Melisa Juan is a 61 y.o. male being evaluated by a Virtual Visit (video visit) encounter to address concerns as mentioned above. A caregiver was present in the room along with the patient.  Pursuant to the emergency declaration under the [facility] waiver authority and the [facility] Appropriations Act, this Virtual Visit was conducted with patient's (and/or legal guardian's) consent, to reduce the patient's risk of exposure to COVID-19 and provide necessary medical care. Services were provided through a video synchronous discussion virtually to substitute for in-person visit by provider. Patient is present at [address] [place] [address], Tera Yeager and I am physically present at [address] [place] [address]    --Fish Brooks MD on 4/13/2021 at 4:37 PM    An electronic signature was used to authenticate this note. **This report has been created using voice recognition software. It may contain minor errors which are inherent in voice recognition technology. **

## 2021-04-13 NOTE — SUICIDE SAFETY PLAN
SAFETY PLAN    A suicide Safety Plan is a document that supports someone when they are having thoughts of suicide. Warning Signs that indicate a suicidal crisis may be developing: What (situations, thoughts, feelings, body sensations, behaviors, etc.) do you experience that lets you know you are beginning to think about suicide? 1. Drinking Alcohol  2. Living in the past  3. Thinking of a loved one    Internal Coping Strategies:  What things can I do (relaxation techniques, hobbies, physical activities, etc.) to take my mind off my problems without contacting another person? 1. Walking  2. Music  3. Television    People and social settings that provide distraction: Who can I call or where can I go to distract me? 1. Name: Filiberto So  Phone: Not Listed  2. Name: Kylee Choi  Phone: [phone]   3. Place:      [facility]       4. Place: [address] [place] [address] whom I can ask for help: Who can I call when I need help - for example, friends, family, clergy, someone else? 1. Name: Obi John        Phone: [phone]  2. Name: Kylee Choi FGVYI:[phone]   3. Name:Kadi Kayenard  Phone:[phone]     Professionals or Behavioral Health agencies I can contact during a crisis: Who can I call for help - for example, my doctor, my psychiatrist, my psychologist, a mental health provider, a suicide hotline? 1. Clinician Name: [facility]   Phone: [phone]      Clinician Pager or Emergency Contact #: [phone]    2. Clinician Name:Mother    Phone: [phone]      Clinician Pager or Emergency Contact #:     3. Suicide Prevention Lifeline: 5-139-890-TALK (1133)    4. [address] [place] [address] Emergency Services -  for example, [facility] suicide hotlineAbram Hotline: 5-374.629.1130      Emergency Services Address:       Emergency Services Phone:     Making the environment safe: How can I make my environment (house/apartment/living space) safer?  For example, can I remove guns, medications, and other items? 1. Sleeping  2.  Remove guns

## 2021-04-13 NOTE — PROGRESS NOTES
Behavioral Health   Discharge Note    Pt discharged with followings belongings:   Dentures: None  Vision - Corrective Lenses: None(left at home)  Jewelry: None  Clothing: Footwear, Socks, Shirt, Pants  Other Valuables: Wallet, Money (Comment)($10)   Valuables sent home with Patient. Valuables retrieved from safe, Security envelope number:  NA and returned to patient. Patient left department with Staff via Ambulation. Discharged to [facility] \"An Important Message from [employer] About Your Rights\" form photocopy original from admission and provided to pt at discharge NA. Patient education on aftercare instructions: Yes  Bridge Appointment completed: Reviewed Discharge Instructions with patient. Patient verbalizes understanding and agreement with the discharge plan using the teachback method. Information faxed to NA by BRANDY Patient verbalize understanding of AVS:  Yes.     Status EXAM upon discharge:  Status and Exam  Normal: Yes  Facial Expression: Brightened  Affect: Appropriate  Level of Consciousness: Alert  Mood:Normal: Yes  Mood: Other (Comment)(euthymic)  Motor Activity:Normal: Yes  Motor Activity: Decreased  Interview Behavior: Cooperative  Preception: Moriarty to Person, Helayne Bollard to Time, Moriarty to Place, Moriarty to Situation  Attention:Normal: Yes  Attention: Distractible  Thought Processes: Circumstantial  Thought Content:Normal: Yes  Hallucinations: None(Patient deies)  Delusions: Yes  Memory:Normal: Yes  Memory: Poor Recent  Insight and Judgment: Yes  Insight and Judgment: Poor Judgment, Poor Insight  Present Suicidal Ideation: No(Patient denies at this time)  Present Homicidal Ideation: No(Patient denies at this time)    Pamella Hernandez RN

## 2021-04-13 NOTE — PROGRESS NOTES
Group Therapy Note    Date: 4/12/2021  Start Time: 2000  End Time:  2020    Type of Group: Wrap-Up/relaxation    Patient's Goal:  \"Stay focused. \"    Notes:  Met goal    Status After Intervention:  Improved    Participation Level:  Active Listener and Interactive    Participation Quality: Appropriate, Attentive and Sharing      Speech:  normal      Thought Process/Content: Logical      Affective Functioning: Congruent      Mood: euthymic      Level of consciousness:  Alert, Oriented x4 and Attentive      Response to Learning: Able to verbalize current knowledge/experience, Able to verbalize/acknowledge new learning, Able to retain information, Capable of insight, Able to change behavior and Progressing to goal      Endings: None Reported    Modes of Intervention: Education and Support      Discipline Responsible: Registered Nurse      Signature:  Beba Chopra RN

## 2021-04-13 NOTE — PLAN OF CARE
Problem: Altered Mood, Depressive Behavior:  Goal: Able to verbalize and/or display a decrease in depressive symptoms  Description: Able to verbalize and/or display a decrease in depressive symptoms  4/13/2021 0054 by Sagar Kendall RN  Outcome: Ongoing  Note: Patient denies depression, rates mood at a 10.  4/12/2021 1602 by Vinny Dacosta LPN  Outcome: Ongoing  Note: Pt. Able to verbalize depressive symptoms   4/12/2021 1601 by Vinny Dacosta LPN  Outcome: Ongoing  Note: Pt. Able to verbalize and display a decrease in depressive symptoms   Goal: Ability to disclose and discuss suicidal ideas will improve  Description: Ability to disclose and discuss suicidal ideas will improve  4/13/2021 0054 by Sagar Kendall RN  Outcome: Ongoing  Note: Suicidal thoughts denied. 4/12/2021 1602 by Vinny Dacosta LPN  Outcome: Ongoing  Note: Pt able to disclose and discuss suicidal ideas will improve   4/12/2021 1601 by Vinny Daocsta LPN  Outcome: Ongoing  Note: Pt . Georgie Levine to disclose and discuss suicidal ideas will iprove   Goal: Patient specific goal  Description: Patient specific goal  4/13/2021 0054 by Sagar Kendall RN  Outcome: Ongoing  Note: Goal of \"stay focused\" met per patient. 4/12/2021 1602 by Vinny Dacosta LPN  Outcome: Ongoing  Note: Patient stated specific goal   4/12/2021 1601 by Vinny Dacosta LPN  Outcome: Ongoing  Goal: Participates in care planning  Description: Participates in care planning  4/13/2021 0054 by Sagar Kendall RN  Outcome: Ongoing  Note: Patient participated this shift.    4/12/2021 1602 by Vinny Dacosta LPN  Outcome: Ongoing  Note: Patient participated in care planning  4/12/2021 1601 by Vinny Dacosta LPN  Outcome: Ongoing     Problem: Discharge Planning:  Goal: Discharged to appropriate level of care  Description: Discharged to appropriate level of care  4/13/2021 0054 by Jones Smith Allegra Castano RN  Outcome: Ongoing  Note: Discharge planning is in progress. 4/12/2021 1602 by Ruth Rodrigues LPN  Outcome: Ongoing  Note: Patient able to verbalize acceptance of life and situations over which he has no control. Discharge planner working with patient to achieve optimal discharge plan, specific to the needs of this patient. 4/12/2021 1601 by Ruth Rodrigues LPN  Outcome: Ongoing     Problem: Activity:  Goal: Sleeping patterns will improve  Description: Sleeping patterns will improve  4/13/2021 0054 by Lena Henry RN  Outcome: Ongoing  Note: Patient slept 8 hours the previous night. 4/12/2021 1602 by Ruth Rodrigues LPN  Outcome: Ongoing  Note: Pt sleeping pattern will improve   4/12/2021 1601 by Ruth Rodrigues LPN  Outcome: Ongoing     Problem: Pain:  Goal: Pain level will decrease  Description: Pain level will decrease  4/13/2021 0054 by Lena Henry RN  Outcome: Ongoing  Note: Patient reports throat pain, refer to flow sheets.    4/12/2021 1602 by Ruth Rodrigues LPN  Outcome: Ongoing  Note: Patient pain level did decrease with pain medication   4/12/2021 1601 by Ruth Rodrigues LPN  Outcome: Ongoing     Problem: Coping:  Goal: Ability to identify problematic behaviors that deter socialization will improve  Description: Ability to identify problematic behaviors that deter socialization will improve  4/13/2021 0054 by Lena Henry RN  Outcome: Ongoing  4/12/2021 1602 by Ruth Rodrigues LPN  Outcome: Ongoing  Note: Pt ability to identify problematic behaviors that will deter socialization will improve   4/12/2021 1601 by Ruth Rodrigues LPN  Outcome: Ongoing     Problem: Role Relationship:  Goal: Ability to demonstrate positive changes in social behaviors and relationships will improve  Description: Ability to demonstrate positive changes in social behaviors and relationships will improve 4/13/2021 0054 by Evy Burns RN  Outcome: Ongoing  4/12/2021 1602 by Daralyn Osgood, LPN  Outcome: Ongoing  Note: Patient able to demonstrate positive changes in social behaviors and relationships will improve   4/12/2021 1601 by Daralyn Osgood, LPN  Outcome: Ongoing  4/12/2021 1119 by Paco Kenyon  Outcome: Met This Shift  Note: Pt has attended 3/3 groups that have been offered on the unit so far this shift. Pt has been out on the unit and has been seen interacting with peers. Pt has met the socialization goal set for this shift. Care plan reviewed with patient.   Patient does verbalize understanding of the plan of care and does contribute to goal setting

## 2021-04-13 NOTE — PROGRESS NOTES
Individual session with patient to complete Safety Plan. Pt verbalized plan was completed and he turned it into his nurse. Pt stated that he was going home today and was smiling. Agreed to complete [employer] about his treatment here. Survey given with instructions on completion. Offered self for any additional resources that pt may need. No needs verbalized.

## 2021-04-13 NOTE — PROGRESS NOTES
0935: Patient made phone call to [facility]. Patient is accepted to the facility and they prefer that he arrives between 6 and 12 today.

## 2021-04-18 ENCOUNTER — TELEPHONE (OUTPATIENT)
Dept: PSYCHIATRY | Age: 56
End: 2021-04-18
